# Patient Record
Sex: FEMALE | Race: BLACK OR AFRICAN AMERICAN | NOT HISPANIC OR LATINO | Employment: OTHER | URBAN - METROPOLITAN AREA
[De-identification: names, ages, dates, MRNs, and addresses within clinical notes are randomized per-mention and may not be internally consistent; named-entity substitution may affect disease eponyms.]

---

## 2017-01-03 ENCOUNTER — OFFICE VISIT (OUTPATIENT)
Dept: WOUND CARE | Facility: HOSPITAL | Age: 67
End: 2017-01-03
Attending: SURGERY

## 2017-01-03 PROCEDURE — 97602 WOUND(S) CARE NON-SELECTIVE: CPT

## 2017-01-03 NOTE — WOUND CARE CLINIC NOTE
ADMISSION DIAGNOSIS:  History of left 3rd toe amputation     HISTORY OF PRESENT ILLNESS: This 66-year-old female underwent a left toe amputation for chronic lower extremity left 3rd toe diabetic ulcer. The patient reports that she is doing well. The patient currently has no drainage from the site.     PHYSICAL EXAMINATION:  GENERAL: On today's exam, the patient is awake, alert, and in no apparent distress.   VITAL SIGNS: Temperature is 98.1, pulse 69, respirations 16, blood pressure is 148/74.   EXTREMITIES: Over the left 3rd toe, the operative site exhibits a small eschar. There is no erythema. Drainage is none. Foot edema is minimal. There is no sign of infection.     ASSESSMENT AND PLAN: The patient is progressing well. On today's visit, a few of her remaining sutures were removed. The patient has no sign of infection over the foot.  The patient may continue with her current Iodosorb gel dressing applied to the site once every other day. The patient may use a Darco offloading shoe.  The patient should elevate the leg while in the chair. The patient may return to work. She should avoid strenuous activity. The patient will return to see Dr. Gandara in 2 weeks.       Navjot Gandara M.D.  RM:Perez  D:   01/03/2017 08:41:20  T:   01/03/2017 09:02:44  Job ID:   [id number]  Document ID:   [id number]  Rev:  0  cc:   SAM Hernández M.D.

## 2017-01-17 ENCOUNTER — OFFICE VISIT (OUTPATIENT)
Dept: WOUND CARE | Facility: HOSPITAL | Age: 67
End: 2017-01-17
Attending: SURGERY

## 2017-01-17 PROCEDURE — 97602 WOUND(S) CARE NON-SELECTIVE: CPT

## 2017-01-17 NOTE — WOUND CARE CLINIC NOTE
DATE OF VISIT:   01/17/2017    ADMITTING DIAGNOSIS:  Left 3rd amputation of toe.     HISTORY OF PRESENT ILLNESS: This 66-year-old female underwent a left 3rd toe amputation in the past for chronic diabetic foot ulcer. The patient reports she is doing well. She is applying Iodosorb to the operative site. The patient has 1-2 sutures still intact.     PHYSICAL EXAMINATION:   GENERAL:  On today's exam, the patient is awake, alert, in no apparent distress.   VITAL SIGNS: Temperature is 97.4, pulse 68, respirations 16, blood pressure 143/52.   EXTREMITIES:  Over the left lower extremity the patient has no sign of erythema. The 2 retaining sutures today are intact. The patient has no sign of drainage. There is continued healing over the amputation site.     ASSESSMENT:  The patient's left amputation site appears to be healing well. On today's visit, the remaining 2 sutures will be removed. The patient will need to continue with Iodosorb applied to the operative site. The patient will use a hospital sandal for the next 2 weeks until complete healing has taken place. The patient will return to the Wound Care Clinic in 2 weeks with Dr. Gandara.      Navjot Gandara M.D.  RM:kristian  D:   01/17/2017 08:35:13  T:   01/17/2017 13:22:52  Job ID:   [id number]  Document ID:   [id number]  Rev:  0  cc:   SAM Hernández M.D.

## 2017-01-25 ENCOUNTER — OFFICE VISIT (OUTPATIENT)
Dept: CARDIOLOGY | Facility: CLINIC | Age: 67
End: 2017-01-25

## 2017-01-25 VITALS
HEIGHT: 64 IN | SYSTOLIC BLOOD PRESSURE: 120 MMHG | DIASTOLIC BLOOD PRESSURE: 80 MMHG | WEIGHT: 204 LBS | HEART RATE: 68 BPM | BODY MASS INDEX: 34.83 KG/M2

## 2017-01-25 DIAGNOSIS — Z94.0 RENAL TRANSPLANT RECIPIENT: ICD-10-CM

## 2017-01-25 DIAGNOSIS — I10 BENIGN HYPERTENSION: ICD-10-CM

## 2017-01-25 DIAGNOSIS — I34.0 NON-RHEUMATIC MITRAL REGURGITATION: Primary | ICD-10-CM

## 2017-01-25 DIAGNOSIS — E78.5 HYPERLIPIDEMIA, UNSPECIFIED HYPERLIPIDEMIA TYPE: ICD-10-CM

## 2017-01-25 PROCEDURE — 93000 ELECTROCARDIOGRAM COMPLETE: CPT | Performed by: INTERNAL MEDICINE

## 2017-01-25 PROCEDURE — 99214 OFFICE O/P EST MOD 30 MIN: CPT | Performed by: INTERNAL MEDICINE

## 2017-01-25 NOTE — MR AVS SNAPSHOT
Diana Gee   1/25/2017 2:00 PM   Office Visit    Dept Phone:  [phone]   Encounter #:  [id number]    Provider:  Ragini Capellan MD   Department:  [facility] CARDIOLOGY                Your Full Care Plan              Today's Medication Changes          These changes are accurate as of: 1/25/17  2:43 PM.  If you have any questions, ask your nurse or doctor.               Medication(s)that have changed:     HUMALOG KWIKPEN 100 UNIT/ML solution pen-injector   Generic drug:  Insulin Lispro   Inject 3 Units under the skin daily with breakfast.   What changed:  Another medication with the same name was removed. Continue taking this medication, and follow the directions you see here.       rosuvastatin 40 MG tablet   Commonly known as:  CRESTOR   TAKE 1 TABLET DAILY ( FUTURE APPOINTMENT 10/20/2015 )   What changed:  Another medication with the same name was removed. Continue taking this medication, and follow the directions you see here.   Changed by:  Regina Soares MD         Stop taking medication(s)listed here:     denosumab 60 MG/ML solution syringe   Commonly known as:  PROLIA   Stopped by:  Ragini Capellan MD           HYDROcodone-acetaminophen 5-325 MG per tablet   Commonly known as:  NORCO   Stopped by:  Ragini Capellan MD           valACYclovir 1000 MG tablet   Commonly known as:  VALTREX   Stopped by:  Ragini Capellan MD           zolpidem 10 MG tablet   Commonly known as:  AMBIEN   Stopped by:  Ragini Capellan MD                      Your Updated Medication List          This list is accurate as of: 1/25/17  2:43 PM.  Always use your most recent med list.                ACCU-CHEK [facility]CLIX LANCETS [facility]       ACCU-CHEK SMARTVIEW test strip   Generic drug:  glucose blood       allopurinol 100 MG tablet   Commonly known as:  ZYLOPRIM       BABY ASPIRIN PO       carvedilol 12.5 MG tablet   Commonly known as:  COREG       escitalopram 10 MG tablet   Commonly known as:  LEXAPRO   TAKE 1 TABLET DAILY       esomeprazole 40 MG capsule   Commonly known as:  nexIUM       gabapentin 600 MG tablet   Commonly known as:  NEURONTIN       HUMALOG KWIKPEN 100 UNIT/ML solution pen-injector   Generic drug:  Insulin Lispro       insulin detemir 100 UNIT/ML injection   Commonly known as:  LEVEMIR       Insulin Pen Needle 32G X 4 MM misc       levothyroxine 175 MCG tablet   Commonly known as:  SYNTHROID, LEVOTHROID       losartan 100 MG tablet   Commonly known as:  COZAAR       Magnesium Oxide 400 (240 MG) MG tablet       MULTI COMPLETE PO       MYFORTIC 360 MG tablet delayed-release EC tablet   Generic drug:  mycophenolate       rosuvastatin 40 MG tablet   Commonly known as:  CRESTOR       tacrolimus 1 MG capsule   Commonly known as:  PROGRAF       Vitamin D2 2000 UNITS tablet               We Performed the Following     ECG 12 Lead       You Were Diagnosed With        Codes Comments    Non-rheumatic mitral regurgitation    -  Primary ICD-10-CM: I34.0  ICD-9-CM: 424.0     Hyperlipidemia, unspecified hyperlipidemia type     ICD-10-CM: E78.5  ICD-9-CM: 272.4     Benign hypertension     ICD-10-CM: I10  ICD-9-CM: 401.1     Renal transplant recipient     ICD-10-CM: Z94.0  ICD-9-CM: V42.0       Instructions     None    Patient Instructions History      Upcoming Appointments     Visit Type Date Time Department    FOLLOW UP 1/25/2017  2:00 PM EL[facility] BLANCHARD [place]    FOLLOW UP 1/31/2017  8:15 AM  [facility]    OFFICE VISIT 4/6/2017  1:30 PM [facility]      [facility] Signup     [facility] [email or web address] allows you to send messages to your doctor, view your test results, renew your prescriptions, schedule appointments, and more. To sign up, go to [employer] and click on the Sign Up Now link in the New User? box. Enter your [email or web address] Activation Code exactly as it appears below along with the last four digits of your Social Security Number  "and your Date of Birth () to complete the sign-up process. If you do not sign up before the expiration date, you must request a new code.    [employer] Activation Code: [email or web address]  Expires: 2017  5:36 AM    If you have questions, you can email [email or web address] or call [phone] to talk to our [employer] staff. Remember, [employer] is NOT to be used for urgent needs. For medical emergencies, dial 911.               Other Info from Your Visit           Your Appointments     2017  8:15 AM EST   Follow Up with Navjot Gandara MD   [facility] WOUND CARE ([place])    [address] [facility] [address] [phone]           Arrive 15 minutes prior to appointment.            2017  1:30 PM EDT   Office Visit with Regina Soares MD   [facility] FAMILY MEDICINE (--)    [address]  [place] [address] [phone]           Arrive 15 minutes prior to appointment.            2018  2:20 PM EST   Follow Up with Ragini Capellan MD   [facility] CARDIOLOGY (--)    [address]  [place] [address] [phone]           Arrive 15 minutes prior to appointment.              Allergies     Heparin  Hives    Levaquin [Levofloxacin]  Hives      Reason for Visit     Hypertension           Vital Signs     Blood Pressure Pulse Height Weight Body Mass Index Smoking Status    120/80 (BP Location: Left arm, Patient Position: Sitting) 68 64\" (162.6 cm) 204 lb (92.5 kg) 35.02 kg/m2 Former Smoker      Problems and Diagnoses Noted     High blood pressure    High cholesterol or triglycerides    Leaky mitral heart valve    Renal transplant recipient      Results         "

## 2017-01-25 NOTE — PROGRESS NOTES
Date of Office Visit: 2017  Encounter Provider: Ragini Capellan MD  Place of Service: [facility] CARDIOLOGY  Patient Name: Diana Gee  :1950      Patient ID:  Diana Gee is a 66 y.o. female is here for  followup for MR and HTN.         History of Present Illness    She follows with Dr. Soares. She has a long history of mainly renal issues. She  developed glomerulonephritis in  and wound up requiring transplantation for kidney  failure. She had dialysis prior to this. She then developed diabetes and, with  progressive diabetes, her transplanted kidney failed. She was also found to have cancer  of her kidney. I am not sure if it was a transplanted kidney or her native which was  removed. She wound up back on dialysis in  and has a fistula that is functional still  in her right upper extremity. The prior left upper extremity AV fistula had failed. She  was on dialysis until  when she was transplanted again.      She does have a known history of hepatitis C. She has also had a deep venous thrombosis  in her right leg in  during a hospitalization. She has a history of gout and thyroid disease.   She has diabetes mellitus, hypertension, and hyperlipidemia, and she had rheumatic fever as a child.      She did have a cardiac catheterization done prior to her transplantation in . It was  done at [facility] or [facility], one of those two. We will get those  results.      She did have a 2-D echocardiogram with Doppler done on 2013 showing an ejection  fraction of 53%, grade 2 diastolic dysfunction, severe left atrial enlargement, moderate  right atrial enlargement, aortic valve thickening without stenosis, mild to moderate  mitral insufficiency, mild tricuspid insufficiency, and normal right ventricular systolic  pressure.            She had an echocardiogram performed in 2015 which revealed an ejection fraction  of 55-60%, grade II diastolic dysfunction, trace to mild mitral insufficiency, severe left atrial enlargement and calcification of the LVOT without obstruction.  She had lower extremity arterial Doppler studies performed in 06/2015 and these were normal.  I did find a heart catheterization from 05/2011 and this revealed normal left and right sided filling pressures, mild coronary disease, normal left ventricular systolic function, no pulmonary artery hypertension.  There were really only luminal irregularities noted in the LAD.  All the other vessels showed no disease at all.       She is overall doing well.  She has no tachycardia, dizziness, palpitations, syncope, exertional chest pain or pressure.  She denies any dyspnea on exertion or lower extremity edema.  She did have a toe amputated on her left foot.  That is the third toe and she is doing well.  It is healing well.  She says she feels good.  Her energy level is stable and her appetite is good.       She had a normal stress PET study in 06/2015.  This was obtained secondary to dyspnea on exertion.          Past Medical History   Diagnosis Date   • Abnormal glucose    • Acute bronchitis    • Acute upper respiratory infection    • C. difficile diarrhea 2008     Psychiatric   • Carrier of viral hepatitis C      Declared viral free in Sept '14  Dr Julio Serna  [facility]   • Cellulitis of foot    • Cerumen impaction    • Cerumen impaction    • Charcot's joint    • Depression with anxiety    • Diabetes mellitus    • Disease of thyroid gland    • Failed kidney transplant    • Gastric ulcer    • Gout    • Graves disease    • Heart murmur    • Hepatitis C    • Hyperlipidemia    • Hypertension    • Hypothyroidism    • Insomnia    • Kidney carcinoma    • Kidney failure    • MR (mitral regurgitation)      moderate, with cunningham dysfctn, severe LAE, mod OLENA, aortic thickening without insuff- Dr Horn   • Neuropathy    • Osteomyelitis of toe    • Osteoporosis    •  Peripheral edema    • Pes planus    • Subconjunctival hemorrhage    • Transplant recipient 1995, 2011     BILATERAL KIDNEYS   • Type 2 diabetes mellitus with diabetic mononeuropathy          Past Surgical History   Procedure Laterality Date   • Transplantation renal Left 1995 2011-RIGHT KIDNEY TRANSPLANT   • Cholecystectomy  1992   • Tubal abdominal ligation     • Nephrectomy Right 2008     CANCER   • Joint replacement Left 2001     SHOULDER   • Toe amputation Left 2014     FIFTH TOE   • Skin graft Right 2012     LEG   • Eye surgery Bilateral 1997, 1998     CATARACTS   • Tonsillectomy  1964   • Av fistula placement Bilateral 1992-LEFT ARM, 2008-RIGHT ARM   • Amputation digit Left 6/9/2016     Procedure: LT 4TH TOE AMPUTATION;  Surgeon: Navjot Gandara MD;  Location: [facility] OR [facility];  Service:    • Amputation digit Left 12/15/2016     Procedure: LT THIRD TOE AMPUTATION;  Surgeon: Navjot Gandara MD;  Location: [facility] OR;  Service:        Current Outpatient Prescriptions on File Prior to Visit   Medication Sig Dispense Refill   • ACCU-CHEK FASTCLIX LANCETS misc      • allopurinol (ZYLOPRIM) 100 MG tablet Take 100 mg by mouth.     • BABY ASPIRIN PO Take 81 mg by mouth.     • carvedilol (COREG) 12.5 MG tablet      • Ergocalciferol (VITAMIN D2) 2000 UNITS tablet Take 1 tablet by mouth daily.     • escitalopram (LEXAPRO) 10 MG tablet TAKE 1 TABLET DAILY 90 tablet 1   • esomeprazole (NexIUM) 40 MG capsule Take 40 mg by mouth every morning before breakfast.     • gabapentin (NEURONTIN) 600 MG tablet Take 400 mg by mouth 2 (Two) Times a Day.     • glucose blood (ACCU-CHEK SMARTVIEW) test strip      • insulin detemir (LEVEMIR) 100 UNIT/ML injection Inject 5 Units under the skin.     • Insulin Lispro, Human, (HUMALOG KWIKPEN) 100 UNIT/ML solution pen-injector Inject 3 Units under the skin daily with breakfast.     • Insulin Pen Needle 32G X 4 MM misc Inject 1 each under the skin.     • levothyroxine  (SYNTHROID, LEVOTHROID) 175 MCG tablet Take 175 mcg by mouth.     • losartan (COZAAR) 100 MG tablet Take 100 mg by mouth daily.     • Magnesium Oxide 400 (240 MG) MG tablet Take 2 tablets by mouth.     • Multiple Vitamins-Minerals (MULTI COMPLETE PO) Take  by mouth.     • mycophenolate (MYFORTIC) 360 MG tablet delayed-release EC tablet Take 2 tablets by mouth 2 (two) times a day.     • rosuvastatin (CRESTOR) 40 MG tablet TAKE 1 TABLET DAILY ( FUTURE APPOINTMENT 10/20/2015 )     • tacrolimus (PROGRAF) 1 MG capsule Take 1 mg by mouth 2 (two) times a day. 2 CAPS EACH DOSE     • [DISCONTINUED] denosumab (PROLIA) 60 MG/ML solution syringe Inject  under the skin.     • [DISCONTINUED] HYDROcodone-acetaminophen (NORCO) 5-325 MG per tablet Take 1 tablet by mouth every 6 (six) hours as needed for moderate pain (4-6) for up to 15 doses. 15 tablet 0   • [DISCONTINUED] insulin lispro (HUMALOG) 100 UNIT/ML injection Inject 3 Units under the skin.     • [DISCONTINUED] rosuvastatin (CRESTOR) 40 MG tablet Take 1 tablet by mouth daily.     • [DISCONTINUED] valACYclovir (VALTREX) 1000 MG tablet      • [DISCONTINUED] zolpidem (AMBIEN) 10 MG tablet Take 10 mg by mouth Daily.       No current facility-administered medications on file prior to visit.        Social History     Social History   • Marital status:      Spouse name: N/A   • Number of children: N/A   • Years of education: N/A     Occupational History   • Not on file.     Social History Main Topics   • Smoking status: Former Smoker     Quit date: 1993   • Smokeless tobacco: Not on file   • Alcohol use No      Comment: caffeine use   • Drug use: No   • Sexual activity: Defer     Other Topics Concern   • Not on file     Social History Narrative           Review of Systems   Constitution: Negative.   HENT: Negative for congestion and headaches.    Eyes: Negative for vision loss in left eye and vision loss in right eye.   Respiratory: Negative.  Negative for cough,  "hemoptysis, shortness of breath, sleep disturbances due to breathing, snoring, sputum production and wheezing.    Endocrine: Negative.    Hematologic/Lymphatic: Negative.    Skin: Negative for poor wound healing and rash.   Musculoskeletal: Negative for falls, gout, muscle cramps and myalgias.   Gastrointestinal: Negative for abdominal pain, diarrhea, dysphagia, hematemesis, melena, nausea and vomiting.   Neurological: Negative for excessive daytime sleepiness, dizziness, light-headedness, loss of balance, seizures and vertigo.   Psychiatric/Behavioral: Negative for depression and substance abuse. The patient is not nervous/anxious.        Procedures    ECG 12 Lead  Date/Time: 1/25/2017 2:33 PM  Performed by: LILA CALLAHAN  Authorized by: LILA CALLAHAN   Comparison: compared with previous ECG   Similar to previous ECG  Rhythm: sinus rhythm  Clinical impression: normal ECG               Objective:      Vitals:    01/25/17 1420   BP: 120/80   BP Location: Left arm   Patient Position: Sitting   Pulse: 68   Weight: 204 lb (92.5 kg)   Height: 64\" (162.6 cm)     Body mass index is 35.02 kg/(m^2).    Physical Exam   Constitutional: She is oriented to person, place, and time. She appears well-developed and well-nourished. No distress.   HENT:   Head: Normocephalic and atraumatic.   Eyes: Conjunctivae are normal. No scleral icterus.   Neck: Neck supple. No JVD present. Carotid bruit is not present. No thyromegaly present.   Cardiovascular: Normal rate, regular rhythm, S1 normal, S2 normal, normal heart sounds and intact distal pulses.   No extrasystoles are present. PMI is not displaced.  Exam reveals no gallop.    No murmur heard.  Pulses:       Carotid pulses are 2+ on the right side, and 2+ on the left side.       Radial pulses are 2+ on the right side, and 2+ on the left side.        Dorsalis pedis pulses are 2+ on the right side, and 2+ on the left side.        Posterior tibial pulses are 2+ on the right " side, and 2+ on the left side.   Pulmonary/Chest: Effort normal and breath sounds normal. No respiratory distress. She has no wheezes. She has no rhonchi. She has no rales. She exhibits no tenderness.   Abdominal: Soft. Bowel sounds are normal. She exhibits no distension, no abdominal bruit and no mass. There is no tenderness.   Musculoskeletal: She exhibits no edema or deformity.   Amputations of left toes   Lymphadenopathy:     She has no cervical adenopathy.   Neurological: She is alert and oriented to person, place, and time. No cranial nerve deficit.   Skin: Skin is warm and dry. No rash noted. She is not diaphoretic. No cyanosis. No pallor. Nails show no clubbing.   Psychiatric: She has a normal mood and affect. Judgment normal.   Vitals reviewed.      Lab Review:       Assessment:      Diagnosis Plan   1. Non-rheumatic mitral regurgitation     2. Hyperlipidemia, unspecified hyperlipidemia type     3. Benign hypertension     4. Renal transplant recipient           1. Mild mitral insufficiency, on echocardiogram 6/2015.   2. Aortic valve calcification. I really think this is the murmur that we are hearing.   There is no evidence of valvular stenosis.   3. Normal left ventricular systolic function with an ejection fraction of 53%.   4. Grade II diastolic dysfunction.   5. History of renal failure status post kidney transplantation done twice.   6. History of kidney cancer.   7. History of glomerulonephritis. She follows with Dr. Amber Meyers.   8. Osteoarthritis.   9. Gout is stable.   10. Hepatitis C is stable.   11. History of gastric ulcer is stable.   12. Blood clots in the right leg, she now has increasing LE edema on the right, set up venous duplex   study.   13. Hypertension well controlled at this point. She does have diastolic dysfunction which  probably is a result of this.   14. Diabetes mellitus type 2. s/p left toe amputation 1/2014 and 12/2016.  15. Hyperlipidemia.   16. History of rheumatic  fever as a child. I do not think that is the source of her  current valve issue.   17. Normal PET stress 6/2015.         Plan:       See back in 1 year, no changes.

## 2017-01-31 ENCOUNTER — OFFICE VISIT (OUTPATIENT)
Dept: WOUND CARE | Facility: HOSPITAL | Age: 67
End: 2017-01-31
Attending: SURGERY

## 2017-01-31 PROCEDURE — G0463 HOSPITAL OUTPT CLINIC VISIT: HCPCS

## 2017-02-01 NOTE — WOUND CARE CLINIC NOTE
DATE OF SERVICE: 01/31/2017     ADMITTING DIAGNOSIS: Amputation of left 3rd toe.     HISTORY OF PRESENT ILLNESS: This 66-year-old female underwent a left 3rd toe amputation in the past for a chronic diabetic foot ulcer. The patient is currently applying an Iodosorb gel to this amputation site with no history of drainage. The patient is wearing a surgical shoe currently and avoiding pressure over the operative site.     PHYSICAL EXAMINATION:  GENERAL: On today's exam, the patient is awake, alert, in no apparent distress.   VITAL SIGNS: Temperature is 98.1, pulse 65, respirations 18, blood pressure 183/84.   EXTREMITIES: Over the left foot the 3rd toe amputation site exhibits well apposed skin edges. There is no erythema. The site appears to have healed. There is no sign of drainage.     ASSESSMENT AND PLAN: Mrs. Gee is progressing well. On today's exam, the patient’s operative site appears to have healed. We plan to begin a skin moisturizer to this site. She may wear a piece of foam over the site as a protective barrier for the next couple of weeks with a small Band-Aid. The patient may advance into her diabetic shoe with inserts. The patient may return to the Wound Care Clinic on an as-needed basis.      Navjot Gandara M.D.  RM:sushma  D:   01/31/2017 08:29:03  T:   01/31/2017 22:34:20  Job ID:   [id number]  Document ID:   [id number]  Rev:  0  cc:   SAM Hernández M.D.

## 2017-04-05 RX ORDER — ESCITALOPRAM OXALATE 10 MG/1
TABLET ORAL
Qty: 90 TABLET | Refills: 0 | Status: SHIPPED | OUTPATIENT
Start: 2017-04-05 | End: 2017-06-24 | Stop reason: SDUPTHER

## 2017-04-07 ENCOUNTER — OFFICE VISIT (OUTPATIENT)
Dept: FAMILY MEDICINE CLINIC | Facility: CLINIC | Age: 67
End: 2017-04-07

## 2017-04-07 VITALS
SYSTOLIC BLOOD PRESSURE: 128 MMHG | HEIGHT: 64 IN | RESPIRATION RATE: 16 BRPM | BODY MASS INDEX: 35.68 KG/M2 | DIASTOLIC BLOOD PRESSURE: 76 MMHG | WEIGHT: 209 LBS | HEART RATE: 70 BPM | OXYGEN SATURATION: 97 %

## 2017-04-07 DIAGNOSIS — M19.90 ARTHRITIS: Primary | ICD-10-CM

## 2017-04-07 DIAGNOSIS — E10.65 HYPERGLYCEMIA DUE TO TYPE 1 DIABETES MELLITUS (HCC): ICD-10-CM

## 2017-04-07 PROCEDURE — 99213 OFFICE O/P EST LOW 20 MIN: CPT | Performed by: FAMILY MEDICINE

## 2017-04-07 RX ORDER — ROSUVASTATIN CALCIUM 40 MG/1
TABLET, COATED ORAL
COMMUNITY
Start: 2016-12-06 | End: 2019-05-29

## 2017-04-07 RX ORDER — ESCITALOPRAM OXALATE 10 MG/1
10 TABLET ORAL
COMMUNITY
End: 2017-04-07

## 2017-04-07 RX ORDER — ZOLPIDEM TARTRATE 10 MG/1
10 TABLET ORAL DAILY
COMMUNITY
End: 2018-01-31

## 2017-04-07 RX ORDER — LANCETS
EACH MISCELLANEOUS
COMMUNITY
Start: 2017-01-30 | End: 2019-05-29

## 2017-04-07 RX ORDER — ESOMEPRAZOLE MAGNESIUM 10 MG/1
40 GRANULE, FOR SUSPENSION, EXTENDED RELEASE ORAL
COMMUNITY
End: 2017-04-07

## 2017-04-07 RX ORDER — LEVOTHYROXINE SODIUM 175 UG/1
175 TABLET ORAL
COMMUNITY
Start: 2016-12-30 | End: 2018-01-04

## 2017-04-07 RX ORDER — GABAPENTIN 600 MG/1
600 TABLET ORAL
COMMUNITY
End: 2017-04-07

## 2017-04-07 RX ORDER — TRAMADOL HYDROCHLORIDE 50 MG/1
TABLET ORAL
Qty: 40 TABLET | Refills: 2 | Status: SHIPPED | OUTPATIENT
Start: 2017-04-07 | End: 2018-01-31

## 2017-04-07 RX ORDER — ALLOPURINOL 100 MG/1
100 TABLET ORAL
COMMUNITY
End: 2017-04-07

## 2017-04-07 NOTE — PROGRESS NOTES
"Subjective   Diana Gee is a 67 y.o. female.     History of Present Illness Here to fu on diabetes. Thinks her sugars have been very good.    Sugars are good. FBS this am 103. Highest she has seen was 140 at night after a desert.  Exercise: on feet all day at work. 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 Likes treadmill, sta bike. Likes things she can hold on to.    Has been released by Dr Pedersen. BUt has pain in her R thumb.  The following portions of the patient's history were reviewed and updated as appropriate: allergies, current medications, past social history and problem list.    Review of Systems   Constitutional: Negative for activity change, appetite change and unexpected weight change.   HENT: Negative for nosebleeds and trouble swallowing.    Eyes: Negative for pain and visual disturbance.   Respiratory: Negative for chest tightness, shortness of breath and wheezing.    Cardiovascular: Negative for chest pain and palpitations.   Gastrointestinal: Negative for abdominal pain and blood in stool.   Endocrine: Negative.    Genitourinary: Negative for difficulty urinating and hematuria.   Musculoskeletal: Positive for arthralgias (First digit enlarged and tender. Not red or hot.). Negative for joint swelling.   Skin: Negative for color change and rash.   Allergic/Immunologic: Negative.    Neurological: Negative for syncope and speech difficulty.   Hematological: Negative for adenopathy.   Psychiatric/Behavioral: Negative for agitation and confusion.   All other systems reviewed and are negative.      Objective   /76  Pulse 70  Resp 16  Ht 64\" (162.6 cm)  Wt 209 lb (94.8 kg)  SpO2 97%  BMI 35.87 kg/m2  Physical Exam   Constitutional: She is oriented to person, place, and time. Vital signs are normal. She appears well-developed and well-nourished. No distress.   HENT:   Head: Normocephalic.   Neck: Carotid bruit is not present. No thyromegaly present. "   Cardiovascular: Normal rate, regular rhythm and normal heart sounds.    Pulmonary/Chest: Effort normal and breath sounds normal.   Musculoskeletal: Tenderness: R PIP enlarged and tender.FROM with pain.   Neurological: She is alert and oriented to person, place, and time. Gait normal.   Psychiatric: She has a normal mood and affect. Her behavior is normal. Judgment and thought content normal.   Vitals reviewed.      Assessment/Plan   Problem List Items Addressed This Visit     None      Visit Diagnoses     Arthritis    -  Primary    Relevant Medications    traMADol (ULTRAM) 50 MG tablet    Hyperglycemia due to type 1 diabetes mellitus        Relevant Medications    insulin lispro (humaLOG) 100 UNIT/ML injection    insulin detemir (LEVEMIR FLEXTOUCH) 100 UNIT/ML injection    Other Relevant Orders    Hemoglobin A1c           Will see Dr Wiley for R thumb pain.

## 2017-04-08 LAB — HBA1C MFR BLD: 5.6 % (ref 4.8–5.6)

## 2017-06-19 ENCOUNTER — OFFICE VISIT (OUTPATIENT)
Dept: FAMILY MEDICINE CLINIC | Facility: CLINIC | Age: 67
End: 2017-06-19

## 2017-06-19 VITALS
DIASTOLIC BLOOD PRESSURE: 70 MMHG | SYSTOLIC BLOOD PRESSURE: 124 MMHG | OXYGEN SATURATION: 96 % | HEIGHT: 64 IN | WEIGHT: 218 LBS | HEART RATE: 72 BPM | BODY MASS INDEX: 37.22 KG/M2

## 2017-06-19 DIAGNOSIS — M65.4 DE QUERVAIN'S TENOSYNOVITIS, RIGHT: Primary | ICD-10-CM

## 2017-06-19 PROCEDURE — 99212 OFFICE O/P EST SF 10 MIN: CPT | Performed by: FAMILY MEDICINE

## 2017-06-19 NOTE — PROGRESS NOTES
"Subjective   Diana Gee is a 67 y.o. female.     History of Present Illness R hand may have CTSZ. Thumb and wrist hurst , madhu with lifting.  Has had pain in base of thumb for years, but it is now more pronounce and hindering her abilities to do routine household tasks and pain radiates into the forearm.    The following portions of the patient's history were reviewed and updated as appropriate: allergies, current medications, past social history and problem list.    Review of Systems   Constitutional: Negative.    Respiratory: Negative.    Cardiovascular: Negative.    Musculoskeletal: Positive for arthralgias, joint swelling and myalgias.       Objective   /70 (BP Location: Right arm, Patient Position: Sitting, Cuff Size: Large Adult)  Pulse 72  Ht 64\" (162.6 cm)  Wt 218 lb (98.9 kg)  SpO2 96%  BMI 37.42 kg/m2  Physical Exam   Constitutional: She appears well-developed and well-nourished.   Cardiovascular: Normal rate.    Pulmonary/Chest: Effort normal.   Musculoskeletal: She exhibits tenderness (R first MCP of hand is tender, as are the extensor tendons lat forearm. Pain rad across wrist into arm with lifitng). She exhibits no edema or deformity.   No digital or wrist edema or crepitus   Nursing note and vitals reviewed.      Assessment/Plan   Problem List Items Addressed This Visit        Musculoskeletal and Integument    De Quervain's tenosynovitis, right - Primary           Thumb immobilization splint for 2 weeks. If no help, call and refer to hand surgery.  "

## 2017-06-26 RX ORDER — ESCITALOPRAM OXALATE 10 MG/1
TABLET ORAL
Qty: 90 TABLET | Refills: 4 | Status: SHIPPED | OUTPATIENT
Start: 2017-06-26 | End: 2017-08-02

## 2017-07-27 ENCOUNTER — APPOINTMENT (OUTPATIENT)
Dept: WOMENS IMAGING | Facility: HOSPITAL | Age: 67
End: 2017-07-27

## 2017-07-27 PROCEDURE — G0202 SCR MAMMO BI INCL CAD: HCPCS | Performed by: RADIOLOGY

## 2018-01-04 ENCOUNTER — OFFICE VISIT (OUTPATIENT)
Dept: FAMILY MEDICINE CLINIC | Facility: CLINIC | Age: 68
End: 2018-01-04

## 2018-01-04 VITALS
TEMPERATURE: 97.9 F | WEIGHT: 218 LBS | DIASTOLIC BLOOD PRESSURE: 72 MMHG | HEIGHT: 64 IN | SYSTOLIC BLOOD PRESSURE: 128 MMHG | BODY MASS INDEX: 37.22 KG/M2 | HEART RATE: 78 BPM | OXYGEN SATURATION: 99 %

## 2018-01-04 DIAGNOSIS — F41.8 MIXED ANXIETY DEPRESSIVE DISORDER: Primary | ICD-10-CM

## 2018-01-04 PROBLEM — M65.4 DE QUERVAIN'S TENOSYNOVITIS, RIGHT: Status: RESOLVED | Noted: 2017-06-19 | Resolved: 2018-01-04

## 2018-01-04 PROCEDURE — 99213 OFFICE O/P EST LOW 20 MIN: CPT | Performed by: FAMILY MEDICINE

## 2018-01-04 RX ORDER — ESCITALOPRAM OXALATE 20 MG/1
20 TABLET ORAL DAILY
Qty: 90 TABLET | Refills: 1 | Status: SHIPPED | OUTPATIENT
Start: 2018-01-04 | End: 2018-06-15 | Stop reason: SDUPTHER

## 2018-01-04 RX ORDER — LEVOTHYROXINE SODIUM 0.12 MG/1
125 TABLET ORAL
COMMUNITY
Start: 2017-10-24 | End: 2018-01-22

## 2018-01-07 NOTE — PROGRESS NOTES
"Subjective   Diana Gee is a 67 y.o. female. Pt here to establish care, was previously seen by Dr. Soares. She is here to follow up on her depression and anxiety.    Chief Complaint   Patient presents with   • Establish Care   • Med Refill     lexapro        History of Present Illness    Depression anxiety  Has been taking escitalopram with good results. Diagnosed in  when learned  was having an affair. She was started on citalopram and did well for years but then needed change. Has been doing well. Still working in school, enjoys the kids, keeps her active. Mood has been good. Would like to work more on weight loss. Was started on Ambien for sleep after her uncle  last year and she had been his primary care giver, had a really hard time with his death.    Diabetes  S/p renal transplant x 2. Also hx of RCC. Sees endocrine. Reports she is overall doing well. Also sees them for her hypothyroidism.    Sees cardiology for mitral valve insufficiency, seen about one year ago and was stable    Also hx of hep C, treated twice but in remission.      The following portions of the patient's history were reviewed and updated as appropriate: allergies, current medications, past family history, past medical history, past social history, past surgical history and problem list.      Review of Systems   Constitutional: Negative for activity change, appetite change and unexpected weight change.   Respiratory: Negative for shortness of breath.    Cardiovascular: Negative for chest pain.   Gastrointestinal: Negative for blood in stool, constipation and diarrhea.   Psychiatric/Behavioral: Negative for dysphoric mood. The patient is not nervous/anxious.        Objective   Blood pressure 128/72, pulse 78, temperature 97.9 °F (36.6 °C), temperature source Oral, height 162.6 cm (64\"), weight 98.9 kg (218 lb), SpO2 99 %.  Physical Exam   Constitutional: She is oriented to person, place, and time. She appears " well-nourished. No distress.   Eyes: Conjunctivae are normal. Right eye exhibits no discharge. Left eye exhibits no discharge.   Neck: Neck supple. No thyromegaly present.   Cardiovascular: Normal rate, regular rhythm and normal heart sounds.    No murmur heard.  Pulmonary/Chest: Effort normal and breath sounds normal. No respiratory distress. She has no wheezes.   Lymphadenopathy:     She has no cervical adenopathy.   Neurological: She is alert and oriented to person, place, and time.   Psychiatric: She has a normal mood and affect. Her behavior is normal.   Vitals reviewed.      Assessment/Plan   Diana was seen today for establish care and med refill.    Diagnoses and all orders for this visit:    Mixed anxiety depressive disorder    Other orders  -     escitalopram (LEXAPRO) 20 MG tablet; Take 1 tablet by mouth Daily.

## 2018-01-31 ENCOUNTER — OFFICE VISIT (OUTPATIENT)
Dept: CARDIOLOGY | Facility: CLINIC | Age: 68
End: 2018-01-31

## 2018-01-31 VITALS
BODY MASS INDEX: 36.88 KG/M2 | DIASTOLIC BLOOD PRESSURE: 88 MMHG | WEIGHT: 216 LBS | HEART RATE: 67 BPM | SYSTOLIC BLOOD PRESSURE: 138 MMHG | HEIGHT: 64 IN

## 2018-01-31 DIAGNOSIS — E78.5 HYPERLIPIDEMIA, UNSPECIFIED HYPERLIPIDEMIA TYPE: ICD-10-CM

## 2018-01-31 DIAGNOSIS — I10 BENIGN HYPERTENSION: Primary | ICD-10-CM

## 2018-01-31 DIAGNOSIS — I34.0 NON-RHEUMATIC MITRAL REGURGITATION: ICD-10-CM

## 2018-01-31 DIAGNOSIS — E11.59 TYPE 2 DIABETES MELLITUS WITH OTHER CIRCULATORY COMPLICATION, UNSPECIFIED LONG TERM INSULIN USE STATUS: ICD-10-CM

## 2018-01-31 DIAGNOSIS — Z94.0 RENAL TRANSPLANT RECIPIENT: ICD-10-CM

## 2018-01-31 PROCEDURE — 99214 OFFICE O/P EST MOD 30 MIN: CPT | Performed by: NURSE PRACTITIONER

## 2018-01-31 PROCEDURE — 93000 ELECTROCARDIOGRAM COMPLETE: CPT | Performed by: NURSE PRACTITIONER

## 2018-06-15 RX ORDER — ESCITALOPRAM OXALATE 20 MG/1
TABLET ORAL
Qty: 90 TABLET | Refills: 1 | Status: SHIPPED | OUTPATIENT
Start: 2018-06-15 | End: 2018-12-12 | Stop reason: SDUPTHER

## 2018-07-21 NOTE — PROGRESS NOTES
Subjective     REASON FOR CONSULTATION:  History of renal cell carcinoma with now sclerotic lesions at T8 and T7 vertebral body.  Provide an opinion on any further workup or treatment                             REQUESTING PHYSICIAN: Dr. Alberto Myers    RECORDS OBTAINED:  Records of the patients history including those obtained from the referring provider were reviewed and summarized in detail.    HISTORY OF PRESENT ILLNESS:  The patient is a 68 y.o. year old female who is here for an opinion about the above issue.    History of Present Illness patient is a 68-year-old female with history of renal transplant ×2 is here for follow-up she has had history of renal cell cancer back in 2008 and has undergone right nephrectomy by Dr. Alberto Myers.  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  Her first transplant was back in 1995.  She has been on Prograf, immunosuppressive therapy and doing well.  She states that recently the urologist had done a CT scan    CT scan of the chest abdomen pelvis done June 21, 2018 showed some healed rib fractures.  There was evidence of new sclerosis and erosions in the right T8 superior and right T7 inferior end plates.  No suspicious or pulmonary parenchymal finding is seen then at your left kidney is atrophic and measures 56 mm.    Given the new sclerosis in the right side of T7 and T8 vertebrae and MRI was suggested with contrast.  There is abnormal activity in the midline of T8 at the midthoracic spine probably the bone scan the abnormal activity at T8 is present.    Patient's creatinine is 0.77.  Given that she has had 2 kidney transplants I'm concerned about doing an MRI with contrast.  Patient states that she hasn't lost weight and she is got a very good appetite.  She doesn't have any back pain.    Past Medical History:   Diagnosis Date   • Abnormal glucose    • Acute bronchitis    • Acute upper respiratory  infection    • C. difficile diarrhea 2008    [facility]   • Carrier of viral hepatitis C (CMS/HCC)     Declared viral free in Sept '14  Dr Julio Serna  [facility]   • Cellulitis of foot    • Cerumen impaction    • Cerumen impaction    • Charcot's joint    • Depression with anxiety    • Diabetes mellitus (CMS/HCC)    • Disease of thyroid gland    • Failed kidney transplant    • Gastric ulcer    • Gout    • Graves disease    • Heart murmur    • Hepatitis C    • Hyperlipidemia    • Hypertension    • Hypothyroidism    • Insomnia    • Kidney carcinoma (CMS/HCC)    • Kidney failure    • MR (mitral regurgitation)     moderate, with cunningham dysfctn, severe LAE, mod LOENA, aortic thickening without insuff- Dr Horn   • Neuropathy    • Osteomyelitis of toe (CMS/HCC)    • Osteoporosis    • Peripheral edema    • Pes planus    • Subconjunctival hemorrhage    • Transplant recipient 1995, 2011    BILATERAL KIDNEYS   • Type 2 diabetes mellitus with diabetic mononeuropathy (CMS/HCC)         Past Surgical History:   Procedure Laterality Date   • AMPUTATION DIGIT Left 6/9/2016    Procedure: LT 4TH TOE AMPUTATION;  Surgeon: Navjot Gandara MD;  Location: [facility];  Service:    • AMPUTATION DIGIT Left 12/15/2016    Procedure: LT THIRD TOE AMPUTATION;  Surgeon: Navjot Gandara MD;  Location: [facility] OR;  Service:    • ARTERIOVENOUS FISTULA Bilateral 1992-LEFT ARM, 2008-RIGHT ARM   • CHOLECYSTECTOMY  1992   • EYE SURGERY Bilateral 1997, 1998    CATARACTS   • JOINT REPLACEMENT Left 2001    SHOULDER   • NEPHRECTOMY Right 2008    CANCER   • SKIN GRAFT Right 2012    LEG   • TOE AMPUTATION Left 2014    FIFTH TOE   • TONSILLECTOMY  1964   • TRANSPLANTATION RENAL Left 1995 2011-RIGHT KIDNEY TRANSPLANT   • TUBAL ABDOMINAL LIGATION          Current Outpatient Prescriptions on File Prior to Visit   Medication Sig Dispense Refill   • ACCU-CHEK FASTCLIX LANCETS misc USE 1 LANCET FOUR TIMES A DAY     • BABY ASPIRIN PO Take 81  mg by mouth.     • carvedilol (COREG) 12.5 MG tablet Take 12.5 mg by mouth 2 (Two) Times a Day With Meals.     • Ergocalciferol (VITAMIN D2) 2000 UNITS tablet Take 1 tablet by mouth daily.     • escitalopram (LEXAPRO) 20 MG tablet TAKE 1 TABLET DAILY 90 tablet 1   • esomeprazole (NexIUM) 40 MG capsule Take 40 mg by mouth every morning before breakfast.     • glucose blood (ACCU-CHEK SMARTVIEW) test strip      • glucose blood (ACCU-CHEK SMARTVIEW) test strip      • insulin detemir (LEVEMIR FLEXTOUCH) 100 UNIT/ML injection INJECT 5 UNITS UNDER THE SKIN NIGHTLY     • insulin lispro (humaLOG) 100 UNIT/ML injection Inject 3 Units under the skin.     • Insulin Pen Needle (BD PEN NEEDLE KYLAH U/F) 32G X 4 MM misc USE 1 PEN NEEDLE FOUR TIMES A DAY     • Insulin Pen Needle 32G X 4 MM misc Inject 1 each under the skin.     • losartan (COZAAR) 100 MG tablet Take 100 mg by mouth daily.     • Multiple Vitamins-Minerals (MULTI COMPLETE PO) Take  by mouth.     • mycophenolate (MYFORTIC) 360 MG tablet delayed-release EC tablet Take 2 tablets by mouth 2 (two) times a day.     • rosuvastatin (CRESTOR) 40 MG tablet TAKE 1 TABLET DAILY ( FUTURE APPOINTMENT 10/20/2015 )     • tacrolimus (PROGRAF) 1 MG capsule Take 1 mg by mouth 2 (two) times a day. 2 CAPS EACH DOSE       No current facility-administered medications on file prior to visit.         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       Objective     There were no vitals filed for this visit.  No flowsheet data found.    Physical Exam      GENERAL:  Well-developed, well-nourished in no acute distress.   SKIN:  Warm, dry without rashes, purpura or petechiae.  EYES:  Pupils equal, round and reactive to light.  EOMs intact.  Conjunctivae normal.  EARS:  Hearing intact.  NOSE:  Septum midline.  No excoriations or nasal discharge.  MOUTH:  Tongue is well-papillated; no stomatitis or ulcers.  Lips normal.  THROAT:  Oropharynx without lesions or exudates.  NECK:  Supple with good range of motion; no thyromegaly or masses, no JVD.  LYMPHATICS:  No cervical, supraclavicular, axillary or inguinal adenopathy.  CHEST:  Lungs clear to auscultation. Good airflow.  CARDIAC:  Regular rate and rhythm without murmurs, rubs or gallops. Normal S1,S2.  ABDOMEN:  Soft, nontender with no hepatosplenomegaly or masses.  EXTREMITIES:  No clubbing, cyanosis or edema.  NEUROLOGICAL:  Cranial Nerves II-XII grossly intact.  No focal neurological deficits.  PSYCHIATRIC:  Normal affect and mood.        RECENT LABS:  Hematology WBC   Date Value Ref Range Status   12/17/2016 9.17 4.50 - 10.70 10*3/mm3 Final     RBC   Date Value Ref Range Status   12/17/2016 4.22 3.90 - 5.20 10*6/mm3 Final     Hemoglobin   Date Value Ref Range Status   12/17/2016 11.7 (L) 11.9 - 15.5 g/dL Final     Hematocrit   Date Value Ref Range Status   12/17/2016 37.3 35.6 - 45.5 % Final     Platelets   Date Value Ref Range Status   12/17/2016 177 140 - 500 10*3/mm3 Final          Assessment/Plan     1.  History of renal cell cancer status post right nephrectomy in 2008.  She is been followed with observation with CT scans.    2.  Status post kidney transplant ×2, initial one was in 1995 and there was rejection in 2008 and subsequently patient  underwent a second kidney transplant in 2011.    3.  History of new onset sclerosis seen at thoracic 8 and thoracic 7 vertebrae on a routine CT scan of the chest abdomen pelvis.  A bone scan was obtained which also shows evidence of abnormality on T8 vertebral body.  I'm unsure if this is malignancy or arthritis.  Given the history of renal cell carcinoma likely best to obtain a CT-guided biopsy of the lesion at the 8 vertebral body.  I did not order an MRI of the spine with contrast given that she has had 2 kidney transplants.  Patient is on immunosuppressive therapy    4.  Anemia    Plan 1.  Obtain CT-guided biopsy of the sclerotic lesion at T8 vertebral body.    2.  Obtain anemia workup.    3.  In 2 weeks with me.    Yanni Saucedo MD

## 2018-07-23 ENCOUNTER — CONSULT (OUTPATIENT)
Dept: ONCOLOGY | Facility: CLINIC | Age: 68
End: 2018-07-23

## 2018-07-23 ENCOUNTER — LAB (OUTPATIENT)
Dept: LAB | Facility: HOSPITAL | Age: 68
End: 2018-07-23

## 2018-07-23 VITALS
HEART RATE: 75 BPM | WEIGHT: 215.2 LBS | OXYGEN SATURATION: 99 % | DIASTOLIC BLOOD PRESSURE: 70 MMHG | RESPIRATION RATE: 18 BRPM | TEMPERATURE: 98.2 F | SYSTOLIC BLOOD PRESSURE: 100 MMHG | BODY MASS INDEX: 36.74 KG/M2 | HEIGHT: 64 IN

## 2018-07-23 DIAGNOSIS — C64.1 CARCINOMA OF RIGHT KIDNEY (HCC): ICD-10-CM

## 2018-07-23 DIAGNOSIS — C64.9 RENAL CELL CARCINOMA, UNSPECIFIED LATERALITY (HCC): ICD-10-CM

## 2018-07-23 DIAGNOSIS — Z94.0 RENAL TRANSPLANT RECIPIENT: ICD-10-CM

## 2018-07-23 DIAGNOSIS — C64.9 RENAL CELL CARCINOMA, UNSPECIFIED LATERALITY (HCC): Primary | ICD-10-CM

## 2018-07-23 DIAGNOSIS — C64.1 CARCINOMA OF RIGHT KIDNEY (HCC): Primary | ICD-10-CM

## 2018-07-23 LAB
BASOPHILS # BLD AUTO: 0.02 10*3/MM3 (ref 0–0.1)
BASOPHILS NFR BLD AUTO: 0.5 % (ref 0–1.1)
DEPRECATED RDW RBC AUTO: 41.1 FL (ref 37–49)
EOSINOPHIL # BLD AUTO: 0.2 10*3/MM3 (ref 0–0.36)
EOSINOPHIL NFR BLD AUTO: 5.3 % (ref 1–5)
ERYTHROCYTE [DISTWIDTH] IN BLOOD BY AUTOMATED COUNT: 12.7 % (ref 11.7–14.5)
FERRITIN SERPL-MCNC: 109 NG/ML
HCT VFR BLD AUTO: 40.6 % (ref 34–45)
HGB BLD-MCNC: 13.3 G/DL (ref 11.5–14.9)
IMM GRANULOCYTES # BLD: 0.02 10*3/MM3 (ref 0–0.03)
IMM GRANULOCYTES NFR BLD: 0.5 % (ref 0–0.5)
IRON 24H UR-MRATE: 61 MCG/DL (ref 37–145)
IRON SATN MFR SERPL: 20 % (ref 14–48)
LYMPHOCYTES # BLD AUTO: 1.27 10*3/MM3 (ref 1–3.5)
LYMPHOCYTES NFR BLD AUTO: 34 % (ref 20–49)
MCH RBC QN AUTO: 29 PG (ref 27–33)
MCHC RBC AUTO-ENTMCNC: 32.8 G/DL (ref 32–35)
MCV RBC AUTO: 88.5 FL (ref 83–97)
MONOCYTES # BLD AUTO: 0.47 10*3/MM3 (ref 0.25–0.8)
MONOCYTES NFR BLD AUTO: 12.6 % (ref 4–12)
NEUTROPHILS # BLD AUTO: 1.76 10*3/MM3 (ref 1.5–7)
NEUTROPHILS NFR BLD AUTO: 47.1 % (ref 39–75)
NRBC BLD MANUAL-RTO: 0 /100 WBC (ref 0–0)
PLATELET # BLD AUTO: 138 10*3/MM3 (ref 150–375)
PMV BLD AUTO: 10.6 FL (ref 8.9–12.1)
RBC # BLD AUTO: 4.59 10*6/MM3 (ref 3.9–5)
TIBC SERPL-MCNC: 300 MCG/DL (ref 249–505)
TRANSFERRIN SERPL-MCNC: 214 MG/DL (ref 200–360)
VIT B12 BLD-MCNC: 1097 PG/ML (ref 211–946)
WBC NRBC COR # BLD: 3.74 10*3/MM3 (ref 4–10)

## 2018-07-23 PROCEDURE — 82607 VITAMIN B-12: CPT | Performed by: INTERNAL MEDICINE

## 2018-07-23 PROCEDURE — 36415 COLL VENOUS BLD VENIPUNCTURE: CPT | Performed by: INTERNAL MEDICINE

## 2018-07-23 PROCEDURE — 99205 OFFICE O/P NEW HI 60 MIN: CPT | Performed by: INTERNAL MEDICINE

## 2018-07-23 PROCEDURE — 85025 COMPLETE CBC W/AUTO DIFF WBC: CPT | Performed by: INTERNAL MEDICINE

## 2018-07-23 PROCEDURE — 84466 ASSAY OF TRANSFERRIN: CPT | Performed by: INTERNAL MEDICINE

## 2018-07-23 PROCEDURE — 83540 ASSAY OF IRON: CPT | Performed by: INTERNAL MEDICINE

## 2018-07-23 PROCEDURE — 82728 ASSAY OF FERRITIN: CPT | Performed by: INTERNAL MEDICINE

## 2018-07-24 LAB
FOLATE BLD-MCNC: 532.4 NG/ML
FOLATE RBC-MCNC: 1283 NG/ML
HCT VFR BLD AUTO: 41.5 % (ref 34–46.6)

## 2018-07-26 ENCOUNTER — DOCUMENTATION (OUTPATIENT)
Dept: ONCOLOGY | Facility: CLINIC | Age: 68
End: 2018-07-26

## 2018-07-26 NOTE — PROGRESS NOTES
"Call to patient about the \"8\" she scored on the distress tool on 7/23/18. She marked issues of work, financial, nervousness and tingling in her feet. 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 This would help financially, but would also keep her mind off physical issues.    She stated she was very surprised when there were abnormalities on a routine CT recently. She is having a needle biopsy tomorrow and sees Dr. KIRBY in 2 weeks. Pt scored herself a \"4\" today, and is hoping that there will not be any serious issues on her test.     Pt was grateful for the call. She said any anxiety she has at this time is about having to wait for any test results. SW offered assistance in the future if needed.  "

## 2018-07-27 ENCOUNTER — HOSPITAL ENCOUNTER (OUTPATIENT)
Dept: CT IMAGING | Facility: HOSPITAL | Age: 68
Discharge: HOME | End: 2018-07-27
Attending: INTERNAL MEDICINE

## 2018-07-27 ENCOUNTER — DOCUMENTATION (OUTPATIENT)
Dept: ONCOLOGY | Facility: CLINIC | Age: 68
End: 2018-07-27

## 2018-07-27 VITALS
TEMPERATURE: 97 F | RESPIRATION RATE: 16 BRPM | HEIGHT: 64 IN | SYSTOLIC BLOOD PRESSURE: 148 MMHG | BODY MASS INDEX: 36.37 KG/M2 | WEIGHT: 213 LBS | DIASTOLIC BLOOD PRESSURE: 77 MMHG | HEART RATE: 77 BPM | OXYGEN SATURATION: 96 %

## 2018-07-27 DIAGNOSIS — C64.9 RENAL CELL CARCINOMA, UNSPECIFIED LATERALITY (HCC): ICD-10-CM

## 2018-07-27 NOTE — PROGRESS NOTES
Dr. santos called us on Friday, 7/27/2018.  He felt that the lesion in the bone was very small and an accessible.  He recommended not biopsying the area and instead recommended continued observation with follow-up CT scan in a few months.  Patient has a follow-up appointment with Dr. Saucedo on 8/10/2018 and we will keep that appointment so that they can discuss the situation further and arrange for follow-up CT scan.

## 2018-07-27 NOTE — DISCHARGE INSTRUCTIONS
EDUCATION /DISCHARGE INSTRUCTIONS  CT/US guided biopsy:  A biopsy is a procedure done to remove tissue for further analysis.  Before images are taken to locate the target area.  Images can be obtained using ultrasound, CT or MRI.  A physician will clean your skin with antiseptic soap, place a sterile towel around the site and administer a local anesthetic to numb the area.  The physician will then insert a special needle.  Sometimes images are taken of the needle after it is inserted to ensure the needle is in the correct area to be biopsied.   A sample is obtained and sent to the laboratory for study.  Occasionally the laboratory is unable to make a diagnosis from the sample and the procedure may need to be repeated.  Within a week the radiologist will send a report to your physician.  A pathologist will also examine the tissue and send a report.      Risks of the procedure include but are not limited to:   *  Bleeding    *  Infection   *  Puncture of surrounding organs *  Death     *  Lung collapse if the biopsy is near the chest which may require insertion of a tube to re-inflate the lung if severe.      Benefits of the procedure:  Using x-ray helps to locate the area that requires a biopsy. The procedure is less invasive than a surgical procedure, there are no large incisions and it does not require anesthesia.      Alternatives to the procedure:  A biopsy can be performed surgically.  Risks of a surgical biopsy include exposure to anesthesia, infection, excessive bleeding and injury to abdominal organs.  A benefit of surgical biopsy is the ability to see the area to be biopsied and remove of a larger piece of tissue.    THIS EDUCATION INFORMATION WAS REVIEWED PRIOR TO PROCEDURE AND CONSENT. Patient initials__________________Time____0656_______________    Post Procedure:    *  Expect the biopsy site may be tender up to one week.    *  Rest today (no pushing pulling or straining).   *  Slowly increase activity  tomorrow.    *  If you received sedation do not drive for 24 hours.   *  Keep dressing clean and dry.   *  Leave dressing on puncture site for 24 hours.    *  You may shower when dressing removed.    Call your doctor if experiencing:   *  Signs of infection such as redness, swelling, excessive pain and / or foul        smelling drainage from the puncture site.   *  Chills or fever over 101 degrees (by mouth).   *  Unrelieved pain.   *  Any new or severe symptoms.   *  If experiencing sudden / severe shortness of breath or chest pain go to the       nearest emergency room.     Following the procedure:     Follow-up with the ordering physician as directed.    Continue to take other medications as directed by your physician unless    otherwise instructed.   If applicable, resume taking your blood thinners or Aspirin on _7/28/18 after 1100________.      If you have any concerns please call the Radiology Nurses Desk at [phone].  You are the most important factor in your recovery.  Follow the above instructions carefully.

## 2018-07-30 ENCOUNTER — APPOINTMENT (OUTPATIENT)
Dept: WOMENS IMAGING | Facility: HOSPITAL | Age: 68
End: 2018-07-30

## 2018-07-30 PROCEDURE — 77063 BREAST TOMOSYNTHESIS BI: CPT | Performed by: RADIOLOGY

## 2018-07-30 PROCEDURE — 77067 SCR MAMMO BI INCL CAD: CPT | Performed by: RADIOLOGY

## 2018-08-10 ENCOUNTER — OFFICE VISIT (OUTPATIENT)
Dept: ONCOLOGY | Facility: CLINIC | Age: 68
End: 2018-08-10

## 2018-08-10 ENCOUNTER — APPOINTMENT (OUTPATIENT)
Dept: LAB | Facility: HOSPITAL | Age: 68
End: 2018-08-10

## 2018-08-10 VITALS
RESPIRATION RATE: 18 BRPM | OXYGEN SATURATION: 98 % | HEART RATE: 72 BPM | TEMPERATURE: 98.3 F | BODY MASS INDEX: 37.15 KG/M2 | DIASTOLIC BLOOD PRESSURE: 74 MMHG | SYSTOLIC BLOOD PRESSURE: 130 MMHG | WEIGHT: 217.6 LBS | HEIGHT: 64 IN

## 2018-08-10 DIAGNOSIS — Z94.0 RENAL TRANSPLANT RECIPIENT: ICD-10-CM

## 2018-08-10 DIAGNOSIS — C64.1 CARCINOMA OF RIGHT KIDNEY (HCC): ICD-10-CM

## 2018-08-10 DIAGNOSIS — C64.1 CARCINOMA OF RIGHT KIDNEY (HCC): Primary | ICD-10-CM

## 2018-08-10 DIAGNOSIS — C64.9 RENAL CELL CARCINOMA, UNSPECIFIED LATERALITY (HCC): Primary | ICD-10-CM

## 2018-08-10 LAB
BASOPHILS # BLD AUTO: 0.01 10*3/MM3 (ref 0–0.1)
BASOPHILS NFR BLD AUTO: 0.3 % (ref 0–1.1)
DEPRECATED RDW RBC AUTO: 41.2 FL (ref 37–49)
EOSINOPHIL # BLD AUTO: 0.22 10*3/MM3 (ref 0–0.36)
EOSINOPHIL NFR BLD AUTO: 6.5 % (ref 1–5)
ERYTHROCYTE [DISTWIDTH] IN BLOOD BY AUTOMATED COUNT: 12.6 % (ref 11.7–14.5)
HCT VFR BLD AUTO: 39.5 % (ref 34–45)
HGB BLD-MCNC: 12.5 G/DL (ref 11.5–14.9)
IMM GRANULOCYTES # BLD: 0.02 10*3/MM3 (ref 0–0.03)
IMM GRANULOCYTES NFR BLD: 0.6 % (ref 0–0.5)
LYMPHOCYTES # BLD AUTO: 1.23 10*3/MM3 (ref 1–3.5)
LYMPHOCYTES NFR BLD AUTO: 36.3 % (ref 20–49)
MCH RBC QN AUTO: 28.2 PG (ref 27–33)
MCHC RBC AUTO-ENTMCNC: 31.6 G/DL (ref 32–35)
MCV RBC AUTO: 89.2 FL (ref 83–97)
MONOCYTES # BLD AUTO: 0.48 10*3/MM3 (ref 0.25–0.8)
MONOCYTES NFR BLD AUTO: 14.2 % (ref 4–12)
NEUTROPHILS # BLD AUTO: 1.43 10*3/MM3 (ref 1.5–7)
NEUTROPHILS NFR BLD AUTO: 42.1 % (ref 39–75)
NRBC BLD MANUAL-RTO: 0 /100 WBC (ref 0–0)
PLATELET # BLD AUTO: 148 10*3/MM3 (ref 150–375)
PMV BLD AUTO: 9.8 FL (ref 8.9–12.1)
RBC # BLD AUTO: 4.43 10*6/MM3 (ref 3.9–5)
WBC NRBC COR # BLD: 3.39 10*3/MM3 (ref 4–10)

## 2018-08-10 PROCEDURE — 85025 COMPLETE CBC W/AUTO DIFF WBC: CPT | Performed by: INTERNAL MEDICINE

## 2018-08-10 PROCEDURE — 99213 OFFICE O/P EST LOW 20 MIN: CPT | Performed by: INTERNAL MEDICINE

## 2018-08-10 PROCEDURE — 36416 COLLJ CAPILLARY BLOOD SPEC: CPT | Performed by: INTERNAL MEDICINE

## 2018-08-10 NOTE — PROGRESS NOTES
Subjective .     REASONS FOR FOLLOWUP:  1.  History of renal cell carcinoma with now sclerotic lesions at T8 and T7 vertebral body.    HISTORY OF PRESENT ILLNESS:  The patient is a 68 y.o. year old female who is here for follow-up with the above-mentioned history.    History of Present Illness  patient has history of renal cell cancer status post right nephrectomy 2008, history of kidney transplant times 217-1995 and second one in 2011.  She had evidence of a CT scan showing sclerosis at T7-T8 vertebrae.  We have scheduled her for CT-guided bone biopsy at T8 but the radiologist called and said that it was too small to biopsy.  And hence we will need to repeat CT scan in 3 months.  She also had anemia workup.  Iron studies, B12 and folate were normal.    Past Medical History:   Diagnosis Date   • Abnormal glucose    • Acute bronchitis    • Acute upper respiratory infection    • C. 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 (CMS/HCC)     Declared viral free in Sept '14  Dr Julio Serna  [facility]   • Cellulitis of foot    • Cerumen impaction    • Cerumen impaction    • Charcot's joint    • Depression with anxiety    • Diabetes mellitus (CMS/HCC)    • Disease of thyroid gland    • Failed kidney transplant    • Gastric ulcer    • GERD (gastroesophageal reflux disease)    • Gout    • Graves disease    • Heart murmur    • Hepatitis C    • Hyperlipidemia    • Hypertension    • Hypothyroidism    • Insomnia    • Kidney carcinoma (CMS/HCC)    • Kidney failure    • MR (mitral regurgitation)     moderate, with cunningham dysfctn, severe LAE, mod OLENA, aortic thickening without insuff- Dr Horn   • Neuropathy    • Osteomyelitis of toe (CMS/HCC)    • Osteoporosis    • Peripheral edema    • Pes planus    • Subconjunctival hemorrhage    • Transplant recipient 1995, 2011    BILATERAL KIDNEYS   • Type 2 diabetes mellitus with diabetic mononeuropathy (CMS/HCC)        ONCOLOGIC HISTORY:  (History from  previous dates can be found in the separate document.)  The patient is a 68 y.o. year old female who is here for an opinion about the above issue.     History of Present Illness patient is a 68-year-old female with history of renal transplant ×2 is here for follow-up she has had history of renal cell cancer back in 2008 and has undergone right nephrectomy by Dr. Alberto Myers.  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  Her first transplant was back in 1995.  She has been on Prograf, immunosuppressive therapy and doing well.  She states that recently the urologist had done a CT scan     CT scan of the chest abdomen pelvis done June 21, 2018 showed some healed rib fractures.  There was evidence of new sclerosis and erosions in the right T8 superior and right T7 inferior end plates.  No suspicious or pulmonary parenchymal finding is seen then at your left kidney is atrophic and measures 56 mm.     Given the new sclerosis in the right side of T7 and T8 vertebrae and MRI was suggested with contrast.  There is abnormal activity in the midline of T8 at the midthoracic spine probably the bone scan the abnormal activity at T8 is present.     Patient's creatinine is 0.77.  Given that she has had 2 kidney transplants I'm concerned about doing an MRI with contrast.  Patient states that she hasn't lost weight and she is got a very good appetite.  She doesn't have any back pain.     Current Outpatient Prescriptions on File Prior to Visit   Medication Sig Dispense Refill   • ACCU-CHEK FASTCLIX LANCETS [facility] USE 1 LANCET FOUR TIMES A DAY     • BABY ASPIRIN PO Take 81 mg by mouth.     • carvedilol (COREG) 12.5 MG tablet Take 12.5 mg by mouth 2 (Two) Times a Day With Meals.     • Ergocalciferol (VITAMIN D2) 2000 UNITS tablet Take 1 tablet by mouth daily.     • escitalopram (LEXAPRO) 20 MG tablet TAKE 1 TABLET DAILY 90 tablet 1   • esomeprazole (NexIUM) 40 MG capsule  Take 40 mg by mouth every morning before breakfast.     • glucose blood (ACCU-CHEK SMARTVIEW) test strip      • glucose blood (ACCU-CHEK SMARTVIEW) test strip      • insulin detemir (LEVEMIR FLEXTOUCH) 100 UNIT/ML injection INJECT 5 UNITS UNDER THE SKIN NIGHTLY     • insulin lispro (humaLOG) 100 UNIT/ML injection Inject 3 Units under the skin.     • Insulin Pen Needle (BD PEN NEEDLE KYLAH U/F) 32G X 4 MM misc USE 1 PEN NEEDLE FOUR TIMES A DAY     • Insulin Pen Needle 32G X 4 MM misc Inject 1 each under the skin.     • losartan (COZAAR) 100 MG tablet Take 100 mg by mouth daily.     • Multiple Vitamins-Minerals (MULTI COMPLETE PO) Take  by mouth.     • mycophenolate (MYFORTIC) 360 MG tablet delayed-release EC tablet Take 2 tablets by mouth 2 (two) times a day.     • rosuvastatin (CRESTOR) 40 MG tablet TAKE 1 TABLET DAILY ( FUTURE APPOINTMENT 10/20/2015 )     • tacrolimus (PROGRAF) 1 MG capsule Take 1 mg by mouth 2 (two) times a day. 2 CAPS EACH DOSE       No current facility-administered medications on file prior to visit.        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     Review of Systems  A comprehensive 14 point review of systems was performed and was negative except as mentioned.    Objective      There were no vitals filed  for this visit.  Current Status 7/23/2018   ECOG score 0       Physical Exam      GENERAL:  Well-developed, well-nourished in no acute distress.   SKIN:  Warm, dry without rashes, purpura or petechiae.  EYES:  Pupils equal, round and reactive to light.  EOMs intact.  Conjunctivae normal.  EARS:  Hearing intact.  NOSE:  Septum midline.  No excoriations or nasal discharge.  MOUTH:  Tongue is well-papillated; no stomatitis or ulcers.  Lips normal.  THROAT:  Oropharynx without lesions or exudates.  NECK:  Supple with good range of motion; no thyromegaly or masses, no JVD.  LYMPHATICS:  No cervical, supraclavicular, axillary or inguinal adenopathy.  CHEST:  Lungs clear to auscultation. Good airflow.  CARDIAC:  Regular rate and rhythm without murmurs, rubs or gallops. Normal S1,S2.  ABDOMEN:  Soft, nontender with no hepatosplenomegaly or masses.  EXTREMITIES:  No clubbing, cyanosis or edema.  NEUROLOGICAL:  Cranial Nerves II-XII grossly intact.  No focal neurological deficits.  PSYCHIATRIC:  Normal affect and mood.        RECENT LABS:  Hematology WBC   Date Value Ref Range Status   07/23/2018 3.74 (L) 4.00 - 10.00 10*3/mm3 Final     RBC   Date Value Ref Range Status   07/23/2018 4.59 3.90 - 5.00 10*6/mm3 Final     Hemoglobin   Date Value Ref Range Status   07/23/2018 13.3 11.5 - 14.9 g/dL Final     Hematocrit   Date Value Ref Range Status   07/23/2018 41.5 34.0 - 46.6 % Final   07/23/2018 40.6 34.0 - 45.0 % Final     Platelets   Date Value Ref Range Status   07/23/2018 138 (L) 150 - 375 10*3/mm3 Final        Assessment/Plan     1.  History of renal cell cancer status post right nephrectomy in 2008.  She is been followed with observation with CT scans.     2.  Status post kidney transplant ×2, initial one was in 1995 and there was rejection in 2008 and subsequently patient underwent a second kidney transplant in 2011.     3.  History of new onset sclerosis seen at thoracic 8 and thoracic 7 vertebrae on a routine CT scan of  the chest abdomen pelvis.  A bone scan was obtained which also shows evidence of abnormality on T8 vertebral body.  I'm unsure if this is malignancy or arthritis.  Given the history of renal cell carcinoma likely best to obtain a CT-guided biopsy of the lesion at the 8 vertebral body.  I did not order an MRI of the spine with contrast given that she has had 2 kidney transplants.  Patient is on immunosuppressive therapy  · Patient was scheduled for CT-guided biopsy of the T8 vertebral body sclerotic metastases lesion but it was too small to biopsy and repeat CT suggested.     4.  Anemia    Plan 1.  We'll obtain CT scan and bone scan in October 2018.    2.  Follow-up with me after scans done.    Yanni Saucedo MD                  Cc:  Alberto Myers MD

## 2018-08-21 ENCOUNTER — OFFICE VISIT (OUTPATIENT)
Dept: FAMILY MEDICINE CLINIC | Facility: CLINIC | Age: 68
End: 2018-08-21

## 2018-08-21 VITALS
HEIGHT: 64 IN | OXYGEN SATURATION: 99 % | WEIGHT: 215 LBS | DIASTOLIC BLOOD PRESSURE: 72 MMHG | HEART RATE: 77 BPM | BODY MASS INDEX: 36.7 KG/M2 | SYSTOLIC BLOOD PRESSURE: 128 MMHG

## 2018-08-21 DIAGNOSIS — L30.4 INTERTRIGO: Primary | ICD-10-CM

## 2018-08-21 DIAGNOSIS — F41.8 MIXED ANXIETY DEPRESSIVE DISORDER: ICD-10-CM

## 2018-08-21 DIAGNOSIS — R93.5 ABNORMAL CT OF THE ABDOMEN: ICD-10-CM

## 2018-08-21 DIAGNOSIS — G47.01 INSOMNIA DUE TO MEDICAL CONDITION: ICD-10-CM

## 2018-08-21 PROCEDURE — 99214 OFFICE O/P EST MOD 30 MIN: CPT | Performed by: FAMILY MEDICINE

## 2018-08-21 RX ORDER — LEVOTHYROXINE SODIUM 0.12 MG/1
132 TABLET ORAL DAILY
COMMUNITY
Start: 2018-08-20

## 2018-08-21 RX ORDER — NYSTATIN 100000 [USP'U]/G
POWDER TOPICAL 4 TIMES DAILY
COMMUNITY
End: 2019-04-08

## 2018-08-21 RX ORDER — CLOTRIMAZOLE AND BETAMETHASONE DIPROPIONATE 10; .5 MG/ML; MG/ML
LOTION TOPICAL EVERY 12 HOURS SCHEDULED
Qty: 30 ML | Refills: 2 | Status: SHIPPED | OUTPATIENT
Start: 2018-08-21 | End: 2019-04-08

## 2018-08-21 NOTE — PROGRESS NOTES
Subjective   Diana Gee is a 68 y.o. female.     Chief Complaint   Patient presents with   • Kidney Transplant     Discuss previous test and procedures         History of Present Illness  Skin irritation  New problem to me. Occurring in the area of skin folds in the abdomen. Trying to keep dry. Has tried powder from gynecologist. Helping. Sometimes gets area that are more intensely painful and red. Wondering about something for this area, she is using neosporin. Not really wanting cream because it is so messing. Area is doing well right now.      Mood   Well controlled. Says her mood is good. She is spending time with her new 7 month old great grandson. Decided not to do the school work this year. Had hard time with the kids anger, rough home lives, and pushing her and cussing at her. Decided she wasn't going to go through that again. Stable on her escitalopram 20 mg.    Sleep   This is going well. She has not had any more sleep issues. After she took care of her brother and he  she had a lot of wakeful nights and thinks it was related to her thyroid and now things are good and she is sleeping well again.    Kidney transplant  Patient had right nephrectomy secondary to renal cell carcinoma.  She had a kidney transplant in  and this was retracted in .  She had a second kidney transplant in . [place] team [place] , these notes and labs are on her chart.    Bone lesion  On 18 patient have follow-up with Dr. Alberto Myers with urology and reviewed her CT scan.  She had stable renal calculus and renal cysts on the left and a normal-looking transplanted right kidney.  There was a concerning lesion/sclerosis new in onset at the thoracic level vertebra 8.  She has history of renal cell carcinoma status post right nephrectomy in  and was sent back to her oncologist for evaluation.  Patient saw Dr. Saucedo 8/10/18 who advised she have a CT-guided biopsy given her  "history of RCC. Biopsy unable to be performed related to depth of lesion and safety. It is going to be reimaged 10/2018 and has follow up with onc.    DM  Sees Dr. Garcia with endocrine, A1c was 6.1% labs from 8/13. Thyroid medication also managed there.    The following portions of the patient's history were reviewed and updated as appropriate: allergies, current medications and problem list.    Review of Systems   Constitutional: Negative for activity change, appetite change and unexpected weight change.   Respiratory: Negative for shortness of breath.    Cardiovascular: Negative for chest pain.   Musculoskeletal: Negative for back pain and gait problem.   Psychiatric/Behavioral: Negative for dysphoric mood and sleep disturbance. The patient is not nervous/anxious.        Objective   Blood pressure 128/72, pulse 77, height 162 cm (63.78\"), weight 97.5 kg (215 lb), SpO2 99 %.  Physical Exam   Constitutional: She is oriented to person, place, and time. She appears well-nourished. No distress.   Eyes: Conjunctivae are normal. Right eye exhibits no discharge. Left eye exhibits no discharge. No scleral icterus.   Cardiovascular: Normal rate, regular rhythm, normal heart sounds and intact distal pulses.  Exam reveals no gallop and no friction rub.    No murmur heard.  Pulmonary/Chest: Effort normal and breath sounds normal. No respiratory distress. She has no wheezes.   Musculoskeletal: She exhibits no edema.   Neurological: She is alert and oriented to person, place, and time.   Psychiatric: She has a normal mood and affect. Her behavior is normal.   Vitals reviewed.      Assessment/Plan   Diana was seen today for kidney transplant.    Diagnoses and all orders for this visit:    Intertrigo  Will try combined lotion, pt wanting to avoid messy creams. Counseled on skin care and keeping skin dry. Apply gauze over lotion when needed for lesion. Lotion is for the red broken skin not everyday use given it contains steroid, " pt voiced understanding.  Mixed anxiety depressive disorder  Well controlled. Pt reports she is overall doing well though she is nervous about her lesions in the T spine, continue escitalopram.  Insomnia due to medical condition  Resolved. Pt sleeping well.  Abnormal CT of the abdomen  Reviewed notes and labs and reports from imaging done by her urologist and oncologist. She has follow up imaging scheduled.  Other orders  -     clotrimazole-betamethasone (LOTRISONE) 1-0.05 % lotion; Apply  topically to the appropriate area as directed Every 12 (Twelve) Hours.

## 2018-10-15 ENCOUNTER — HOSPITAL ENCOUNTER (OUTPATIENT)
Dept: NUCLEAR MEDICINE | Facility: HOSPITAL | Age: 68
Discharge: HOME | End: 2018-10-15
Attending: INTERNAL MEDICINE

## 2018-10-15 ENCOUNTER — HOSPITAL ENCOUNTER (OUTPATIENT)
Dept: CT IMAGING | Facility: HOSPITAL | Age: 68
Discharge: HOME | End: 2018-10-15
Attending: INTERNAL MEDICINE | Admitting: INTERNAL MEDICINE

## 2018-10-15 DIAGNOSIS — C64.9 RENAL CELL CARCINOMA, UNSPECIFIED LATERALITY (HCC): ICD-10-CM

## 2018-10-15 PROCEDURE — 0 TECHNETIUM MEDRONATE KIT: Performed by: INTERNAL MEDICINE

## 2018-10-15 PROCEDURE — 78306 BONE IMAGING WHOLE BODY: CPT

## 2018-10-15 PROCEDURE — A9503 TC99M MEDRONATE: HCPCS | Performed by: INTERNAL MEDICINE

## 2018-10-15 PROCEDURE — 74176 CT ABD & PELVIS W/O CONTRAST: CPT

## 2018-10-15 PROCEDURE — 71250 CT THORAX DX C-: CPT

## 2018-10-15 RX ORDER — TC 99M MEDRONATE 20 MG/10ML
21.4 INJECTION, POWDER, LYOPHILIZED, FOR SOLUTION INTRAVENOUS
Status: COMPLETED | OUTPATIENT
Start: 2018-10-15 | End: 2018-10-15

## 2018-10-15 RX ADMIN — Medication 21.4 MILLICURIE: at 09:10

## 2018-10-20 NOTE — PROGRESS NOTES
Subjective .     REASONS FOR FOLLOWUP:  1.  History of renal cell carcinoma with now sclerotic lesions at T8 and T7 vertebral body.    HISTORY OF PRESENT ILLNESS:  The patient is a 68 y.o. year old female who is here for follow-up with the above-mentioned history.    History of Present Illness  patient has history of renal cell cancer status post right nephrectomy 2008, history of kidney transplant times 217-1995 and second one in 2011.  She had evidence of a CT scan showing sclerosis at T7-T8 vertebrae.  We have scheduled her for CT-guided bone biopsy at T8 but the radiologist called and said that it was too small to biopsy.  And hence we will need to repeat CT scan in 3 months.  She also had anemia workup.  Iron studies, B12 and folate were normal.    Interval history: Patient had repeat CT scan and bone scan October 2018 to follow up on the questionable lesions on the T7 and T8.  Bone scan was negative it was thought to be arthritis on the bone scan.  CT scan also was thought to be degenerative in nature.    Past Medical History:   Diagnosis Date   • Abnormal glucose    • Acute bronchitis    • Acute upper respiratory infection    • C. difficile diarrhea 2008    [facility]   • Carrier of viral hepatitis C (CMS/HCC)     Declared viral free in Sept '14  Dr Julio Serna  [facility]   • Cellulitis of foot    • Cerumen impaction    • Cerumen impaction    • Charcot's joint    • Depression with anxiety    • Diabetes mellitus (CMS/HCC)    • Disease of thyroid gland    • Failed kidney transplant    • Gastric ulcer    • GERD (gastroesophageal reflux disease)    • Gout    • Graves disease    • Heart murmur    • Hepatitis C    • Hyperlipidemia    • Hypertension    • Hypothyroidism    • Insomnia    • Kidney carcinoma (CMS/HCC)    • Kidney failure    • MR (mitral regurgitation)     moderate, with cunningham dysfctn, severe LAE, mod OLENA, aortic thickening without insuff- Dr Horn   • Neuropathy    • Osteomyelitis of toe  (CMS/[facility])    • Osteoporosis    • Peripheral edema    • Pes planus    • Subconjunctival hemorrhage    • Transplant recipient 1995, 2011    BILATERAL KIDNEYS   • Type 2 diabetes mellitus with diabetic mononeuropathy (CMS/[facility])        ONCOLOGIC HISTORY:  (History from previous dates can be found in the separate document.)  The patient is a 68 y.o. year old female who is here for an opinion about the above issue.     History of Present Illness patient is a 68-year-old female with history of renal transplant ×2 is here for follow-up she has had history of renal cell cancer back in 2008 and has undergone right nephrectomy by Dr. Alberto Myers.  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  Her first transplant was back in 1995.  She has been on Prograf, immunosuppressive therapy and doing well.  She states that recently the urologist had done a CT scan     CT scan of the chest abdomen pelvis done June 21, 2018 showed some healed rib fractures.  There was evidence of new sclerosis and erosions in the right T8 superior and right T7 inferior end plates.  No suspicious or pulmonary parenchymal finding is seen then at your left kidney is atrophic and measures 56 mm.     Given the new sclerosis in the right side of T7 and T8 vertebrae and MRI was suggested with contrast.  There is abnormal activity in the midline of T8 at the midthoracic spine probably the bone scan the abnormal activity at T8 is present.     Patient's creatinine is 0.77.  Given that she has had 2 kidney transplants I'm concerned about doing an MRI with contrast.  Patient states that she hasn't lost weight and she is got a very good appetite.  She doesn't have any back pain.     Current Outpatient Prescriptions on File Prior to Visit   Medication Sig Dispense Refill   • ACCU-CHEK FASTCLIX LANCETS [facility] USE 1 LANCET FOUR TIMES A DAY     • BABY ASPIRIN PO Take 81 mg by mouth.     • carvedilol (COREG)  12.5 MG tablet Take 12.5 mg by mouth 2 (Two) Times a Day With Meals.     • clotrimazole-betamethasone (LOTRISONE) 1-0.05 % lotion Apply  topically to the appropriate area as directed Every 12 (Twelve) Hours. 30 mL 2   • Ergocalciferol (VITAMIN D2) 2000 UNITS tablet Take 1 tablet by mouth daily.     • escitalopram (LEXAPRO) 20 MG tablet TAKE 1 TABLET DAILY 90 tablet 1   • esomeprazole (NexIUM) 40 MG capsule Take 40 mg by mouth every morning before breakfast.     • glucose blood (ACCU-CHEK SMARTVIEW) test strip      • glucose blood (ACCU-CHEK SMARTVIEW) test strip      • insulin detemir (LEVEMIR FLEXTOUCH) 100 UNIT/ML injection INJECT 5 UNITS UNDER THE SKIN NIGHTLY     • insulin lispro (humaLOG) 100 UNIT/ML injection Inject 3 Units under the skin.     • Insulin Pen Needle (BD PEN NEEDLE KYLAH U/F) 32G X 4 MM misc USE 1 PEN NEEDLE FOUR TIMES A DAY     • Insulin Pen Needle 32G X 4 MM misc Inject 1 each under the skin.     • levothyroxine (SYNTHROID, LEVOTHROID) 125 MCG tablet Take 125 mcg by mouth Daily.     • losartan (COZAAR) 100 MG tablet Take 100 mg by mouth daily.     • Multiple Vitamins-Minerals (MULTI COMPLETE PO) Take  by mouth.     • mycophenolate (MYFORTIC) 360 MG tablet delayed-release EC tablet Take 2 tablets by mouth 2 (two) times a day.     • nystatin (MYCOSTATIN) 935318 UNIT/GM powder Apply  topically to the appropriate area as directed 4 (Four) Times a Day.     • rosuvastatin (CRESTOR) 40 MG tablet TAKE 1 TABLET DAILY ( FUTURE APPOINTMENT 10/20/2015 )     • tacrolimus (PROGRAF) 1 MG capsule Take 1 mg by mouth 2 (two) times a day. 2 CAPS EACH DOSE       No current facility-administered medications on file prior to visit.        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     Review of Systems  A comprehensive 14 point review of systems was performed and was negative except as mentioned.    Objective      There were no vitals filed for this visit.  Current Status 8/10/2018   ECOG score 0       Physical Exam      GENERAL:  Well-developed, well-nourished in no acute distress.   SKIN:  Warm, dry without rashes, purpura or petechiae.  EYES:  Pupils equal, round and reactive to light.  EOMs intact.  Conjunctivae normal.  EARS:  Hearing intact.  NOSE:  Septum midline.  No excoriations or nasal discharge.  MOUTH:  Tongue is well-papillated; no stomatitis or ulcers.  Lips normal.  THROAT:  Oropharynx without lesions or exudates.  NECK:  Supple with good range of motion; no thyromegaly or masses, no JVD.  LYMPHATICS:  No cervical, supraclavicular, axillary or inguinal adenopathy.  CHEST:  Lungs clear to auscultation. Good airflow.  CARDIAC:  Regular rate and rhythm without murmurs, rubs or gallops. Normal S1,S2.  ABDOMEN:  Soft, nontender with no hepatosplenomegaly or masses.  EXTREMITIES:  No clubbing, cyanosis or edema.  NEUROLOGICAL:  Cranial Nerves II-XII grossly intact.  No focal neurological deficits.  PSYCHIATRIC:  Normal affect and mood.    I have examined the patient and there is no change as of October 24, 2080    RECENT LABS:  Hematology WBC   Date Value Ref Range Status   08/10/2018 3.39 (L) 4.00 - 10.00 10*3/mm3 Final     RBC   Date Value Ref Range Status   08/10/2018 4.43 3.90 - 5.00 10*6/mm3 Final     Hemoglobin   Date  Value Ref Range Status   08/10/2018 12.5 11.5 - 14.9 g/dL Final     Hematocrit   Date Value Ref Range Status   08/10/2018 39.5 34.0 - 45.0 % Final     Platelets   Date Value Ref Range Status   08/10/2018 148 (L) 150 - 375 10*3/mm3 Final        Assessment/Plan     1.  History of renal cell cancer status post right nephrectomy in 2008.  She is been followed with observation with CT scans.     2.  Status post kidney transplant ×2, initial one was in 1995 and there was rejection in 2008 and subsequently patient underwent a second kidney transplant in 2011.     3.  History of new onset sclerosis seen at thoracic 8 and thoracic 7 vertebrae on a routine CT scan of the chest abdomen pelvis.  A bone scan was obtained which also shows evidence of abnormality on T8 vertebral body.  I'm unsure if this is malignancy or arthritis.  Given the history of renal cell carcinoma likely best to obtain a CT-guided biopsy of the lesion at the 8 vertebral body.  I did not order an MRI of the spine with contrast given that she has had 2 kidney transplants.  Patient is on immunosuppressive therapy  · Patient was scheduled for CT-guided biopsy of the T8 vertebral body sclerotic metastases lesion but it was too small to biopsy and repeat CT suggested.  · Repeat CT scan and bone scan shows that the T7-T8 lesion is more degenerative in nature.     4.  Anemia    Plan 1.  We'll obtain CT scan and bone scan in October 2018.    2.  Follow-up with me after scans done.    Yanni Saucedo MD                  Cc:  Alberto Myers MD

## 2018-10-22 ENCOUNTER — OFFICE VISIT (OUTPATIENT)
Dept: ONCOLOGY | Facility: CLINIC | Age: 68
End: 2018-10-22

## 2018-10-22 ENCOUNTER — LAB (OUTPATIENT)
Dept: LAB | Facility: HOSPITAL | Age: 68
End: 2018-10-22

## 2018-10-22 VITALS
SYSTOLIC BLOOD PRESSURE: 138 MMHG | HEIGHT: 64 IN | HEART RATE: 71 BPM | DIASTOLIC BLOOD PRESSURE: 90 MMHG | TEMPERATURE: 98.3 F | RESPIRATION RATE: 16 BRPM | BODY MASS INDEX: 36.43 KG/M2 | OXYGEN SATURATION: 95 % | WEIGHT: 213.4 LBS

## 2018-10-22 DIAGNOSIS — C64.9 RENAL CELL CARCINOMA, UNSPECIFIED LATERALITY (HCC): ICD-10-CM

## 2018-10-22 DIAGNOSIS — C64.1 CARCINOMA OF RIGHT KIDNEY (HCC): ICD-10-CM

## 2018-10-22 DIAGNOSIS — Z94.0 RENAL TRANSPLANT RECIPIENT: Primary | ICD-10-CM

## 2018-10-22 LAB
ALBUMIN SERPL-MCNC: 3.8 G/DL (ref 3.5–5.2)
ALBUMIN/GLOB SERPL: 1.2 G/DL (ref 1.1–2.4)
ALP SERPL-CCNC: 83 U/L (ref 38–116)
ALT SERPL W P-5'-P-CCNC: 21 U/L (ref 0–33)
ANION GAP SERPL CALCULATED.3IONS-SCNC: 9 MMOL/L
AST SERPL-CCNC: 24 U/L (ref 0–32)
BASOPHILS # BLD AUTO: 0.02 10*3/MM3 (ref 0–0.1)
BASOPHILS NFR BLD AUTO: 0.5 % (ref 0–1.1)
BILIRUB SERPL-MCNC: 0.2 MG/DL (ref 0.1–1.2)
BUN BLD-MCNC: 22 MG/DL (ref 6–20)
BUN/CREAT SERPL: 27.8 (ref 7.3–30)
CALCIUM SPEC-SCNC: 9 MG/DL (ref 8.5–10.2)
CHLORIDE SERPL-SCNC: 106 MMOL/L (ref 98–107)
CO2 SERPL-SCNC: 26 MMOL/L (ref 22–29)
CREAT BLD-MCNC: 0.79 MG/DL (ref 0.6–1.1)
DEPRECATED RDW RBC AUTO: 41.8 FL (ref 37–49)
EOSINOPHIL # BLD AUTO: 0.19 10*3/MM3 (ref 0–0.36)
EOSINOPHIL NFR BLD AUTO: 4.9 % (ref 1–5)
ERYTHROCYTE [DISTWIDTH] IN BLOOD BY AUTOMATED COUNT: 13 % (ref 11.7–14.5)
GFR SERPL CREATININE-BSD FRML MDRD: 88 ML/MIN/1.73
GLOBULIN UR ELPH-MCNC: 3.3 GM/DL (ref 1.8–3.5)
GLUCOSE BLD-MCNC: 90 MG/DL (ref 74–124)
HCT VFR BLD AUTO: 39.3 % (ref 34–45)
HGB BLD-MCNC: 12.3 G/DL (ref 11.5–14.9)
IMM GRANULOCYTES # BLD: 0.01 10*3/MM3 (ref 0–0.03)
IMM GRANULOCYTES NFR BLD: 0.3 % (ref 0–0.5)
LYMPHOCYTES # BLD AUTO: 1.52 10*3/MM3 (ref 1–3.5)
LYMPHOCYTES NFR BLD AUTO: 39.2 % (ref 20–49)
MCH RBC QN AUTO: 27.8 PG (ref 27–33)
MCHC RBC AUTO-ENTMCNC: 31.3 G/DL (ref 32–35)
MCV RBC AUTO: 88.7 FL (ref 83–97)
MONOCYTES # BLD AUTO: 0.53 10*3/MM3 (ref 0.25–0.8)
MONOCYTES NFR BLD AUTO: 13.7 % (ref 4–12)
NEUTROPHILS # BLD AUTO: 1.61 10*3/MM3 (ref 1.5–7)
NEUTROPHILS NFR BLD AUTO: 41.4 % (ref 39–75)
NRBC BLD MANUAL-RTO: 0 /100 WBC (ref 0–0)
PLATELET # BLD AUTO: 173 10*3/MM3 (ref 150–375)
PMV BLD AUTO: 9 FL (ref 8.9–12.1)
POTASSIUM BLD-SCNC: 4.4 MMOL/L (ref 3.5–4.7)
PROT SERPL-MCNC: 7.1 G/DL (ref 6.3–8)
RBC # BLD AUTO: 4.43 10*6/MM3 (ref 3.9–5)
SODIUM BLD-SCNC: 141 MMOL/L (ref 134–145)
WBC NRBC COR # BLD: 3.88 10*3/MM3 (ref 4–10)

## 2018-10-22 PROCEDURE — 99214 OFFICE O/P EST MOD 30 MIN: CPT | Performed by: INTERNAL MEDICINE

## 2018-10-22 PROCEDURE — 80053 COMPREHEN METABOLIC PANEL: CPT | Performed by: INTERNAL MEDICINE

## 2018-10-22 PROCEDURE — 36415 COLL VENOUS BLD VENIPUNCTURE: CPT | Performed by: INTERNAL MEDICINE

## 2018-10-22 PROCEDURE — 85025 COMPLETE CBC W/AUTO DIFF WBC: CPT | Performed by: INTERNAL MEDICINE

## 2018-12-12 RX ORDER — ESCITALOPRAM OXALATE 20 MG/1
TABLET ORAL
Qty: 90 TABLET | Refills: 1 | Status: SHIPPED | OUTPATIENT
Start: 2018-12-12 | End: 2019-05-29 | Stop reason: DRUGHIGH

## 2019-04-08 ENCOUNTER — OFFICE VISIT (OUTPATIENT)
Dept: ONCOLOGY | Facility: CLINIC | Age: 69
End: 2019-04-08

## 2019-04-08 ENCOUNTER — LAB (OUTPATIENT)
Dept: LAB | Facility: HOSPITAL | Age: 69
End: 2019-04-08

## 2019-04-08 VITALS
DIASTOLIC BLOOD PRESSURE: 73 MMHG | WEIGHT: 220.7 LBS | TEMPERATURE: 98.3 F | HEART RATE: 75 BPM | BODY MASS INDEX: 37.68 KG/M2 | SYSTOLIC BLOOD PRESSURE: 149 MMHG | HEIGHT: 64 IN | RESPIRATION RATE: 18 BRPM | OXYGEN SATURATION: 93 %

## 2019-04-08 DIAGNOSIS — Z94.0 RENAL TRANSPLANT RECIPIENT: ICD-10-CM

## 2019-04-08 DIAGNOSIS — C64.1 CARCINOMA OF RIGHT KIDNEY (HCC): Primary | ICD-10-CM

## 2019-04-08 DIAGNOSIS — C64.1 CARCINOMA OF RIGHT KIDNEY (HCC): ICD-10-CM

## 2019-04-08 LAB
ALBUMIN SERPL-MCNC: 3.9 G/DL (ref 3.5–5.2)
ALBUMIN/GLOB SERPL: 1.1 G/DL (ref 1.1–2.4)
ALP SERPL-CCNC: 88 U/L (ref 38–116)
ALT SERPL W P-5'-P-CCNC: 15 U/L (ref 0–33)
ANION GAP SERPL CALCULATED.3IONS-SCNC: 12 MMOL/L
AST SERPL-CCNC: 24 U/L (ref 0–32)
BASOPHILS # BLD AUTO: 0.02 10*3/MM3 (ref 0–0.2)
BASOPHILS NFR BLD AUTO: 0.6 % (ref 0–1.5)
BILIRUB SERPL-MCNC: 0.4 MG/DL (ref 0.2–1.2)
BUN BLD-MCNC: 26 MG/DL (ref 6–20)
BUN/CREAT SERPL: 30.2 (ref 7.3–30)
CALCIUM SPEC-SCNC: 9.4 MG/DL (ref 8.5–10.2)
CHLORIDE SERPL-SCNC: 103 MMOL/L (ref 98–107)
CO2 SERPL-SCNC: 26 MMOL/L (ref 22–29)
CREAT BLD-MCNC: 0.86 MG/DL (ref 0.6–1.1)
DEPRECATED RDW RBC AUTO: 43.8 FL (ref 37–54)
EOSINOPHIL # BLD AUTO: 0.17 10*3/MM3 (ref 0–0.4)
EOSINOPHIL NFR BLD AUTO: 5.2 % (ref 0.3–6.2)
ERYTHROCYTE [DISTWIDTH] IN BLOOD BY AUTOMATED COUNT: 13.1 % (ref 12.3–15.4)
GFR SERPL CREATININE-BSD FRML MDRD: 80 ML/MIN/1.73
GLOBULIN UR ELPH-MCNC: 3.5 GM/DL (ref 1.8–3.5)
GLUCOSE BLD-MCNC: 109 MG/DL (ref 74–124)
HCT VFR BLD AUTO: 39.4 % (ref 34–46.6)
HGB BLD-MCNC: 12.2 G/DL (ref 12–15.9)
IMM GRANULOCYTES # BLD AUTO: 0.01 10*3/MM3 (ref 0–0.05)
IMM GRANULOCYTES NFR BLD AUTO: 0.3 % (ref 0–0.5)
LYMPHOCYTES # BLD AUTO: 1.33 10*3/MM3 (ref 0.7–3.1)
LYMPHOCYTES NFR BLD AUTO: 40.4 % (ref 19.6–45.3)
MCH RBC QN AUTO: 28.5 PG (ref 26.6–33)
MCHC RBC AUTO-ENTMCNC: 31 G/DL (ref 31.5–35.7)
MCV RBC AUTO: 92.1 FL (ref 79–97)
MONOCYTES # BLD AUTO: 0.44 10*3/MM3 (ref 0.1–0.9)
MONOCYTES NFR BLD AUTO: 13.4 % (ref 5–12)
NEUTROPHILS # BLD AUTO: 1.32 10*3/MM3 (ref 1.4–7)
NEUTROPHILS NFR BLD AUTO: 40.1 % (ref 42.7–76)
NRBC BLD AUTO-RTO: 0 /100 WBC (ref 0–0)
PLATELET # BLD AUTO: 165 10*3/MM3 (ref 140–450)
PMV BLD AUTO: 9.2 FL (ref 6–12)
POTASSIUM BLD-SCNC: 4.3 MMOL/L (ref 3.5–4.7)
PROT SERPL-MCNC: 7.4 G/DL (ref 6.3–8)
RBC # BLD AUTO: 4.28 10*6/MM3 (ref 3.77–5.28)
SODIUM BLD-SCNC: 141 MMOL/L (ref 134–145)
WBC NRBC COR # BLD: 3.29 10*3/MM3 (ref 3.4–10.8)

## 2019-04-08 PROCEDURE — 99213 OFFICE O/P EST LOW 20 MIN: CPT | Performed by: INTERNAL MEDICINE

## 2019-04-08 PROCEDURE — 80053 COMPREHEN METABOLIC PANEL: CPT | Performed by: INTERNAL MEDICINE

## 2019-04-08 PROCEDURE — 36415 COLL VENOUS BLD VENIPUNCTURE: CPT | Performed by: INTERNAL MEDICINE

## 2019-04-08 PROCEDURE — 85025 COMPLETE CBC W/AUTO DIFF WBC: CPT | Performed by: INTERNAL MEDICINE

## 2019-04-08 RX ORDER — IRBESARTAN 150 MG/1
150 TABLET ORAL DAILY
COMMUNITY
End: 2021-04-27

## 2019-04-08 NOTE — PROGRESS NOTES
Subjective .     REASONS FOR FOLLOWUP:  1.  History of renal cell carcinoma with now sclerotic lesions at T8 and T7 vertebral body.    HISTORY OF PRESENT ILLNESS:  The patient is a 69 y.o. year old female who is here for follow-up with the above-mentioned history.    History of Present Illness  patient has history of renal cell cancer status post right nephrectomy 2008, history of kidney transplant times 217-1995 and second one in 2011.  She had evidence of a CT scan showing sclerosis at T7-T8 vertebrae.  We have scheduled her for CT-guided bone biopsy at T8 but the radiologist called and said that it was too small to biopsy.  And hence we will need to repeat CT scan in 3 months.  She also had anemia workup.  Iron studies, B12 and folate were normal.    Interval history: Patient had repeat CT scan and bone scan October 2018 to follow up on the questionable lesions on the T7 and T8.  Bone scan was negative it was thought to be arthritis on the bone scan.  CT scan also was thought to be degenerative in nature.    Patient denies any new complaints.  She has chronic mild low back pain.  It is unchanged.  She has not lost weight she has got a good appetite.  She follows up with the transplant physician at [facility].    Past Medical History:   Diagnosis Date   • Abnormal glucose    • Acute bronchitis    • Acute upper respiratory infection    • C. 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 (CMS/HCC)     Declared viral free in Sept '14  Dr Julio Serna  [facility]   • Cellulitis of foot    • Cerumen impaction    • Cerumen impaction    • Charcot's joint    • Depression with anxiety    • Diabetes mellitus (CMS/HCC)    • Disease of thyroid gland    • Failed kidney transplant    • Gastric ulcer    • GERD (gastroesophageal reflux disease)    • Gout    • Graves disease    • Heart murmur    • Hepatitis C    • Hyperlipidemia    • Hypertension    • Hypothyroidism    • Insomnia    • Kidney carcinoma  (CMS/HCC)    • Kidney failure    • MR (mitral regurgitation)     moderate, with cunningham dysfctn, severe LAE, mod OLENA, aortic thickening without insuff- Dr Horn   • Neuropathy    • Osteomyelitis of toe (CMS/HCC)    • Osteoporosis    • Peripheral edema    • Pes planus    • Subconjunctival hemorrhage    • Transplant recipient 1995, 2011    BILATERAL KIDNEYS   • Type 2 diabetes mellitus with diabetic mononeuropathy (CMS/HCC)        ONCOLOGIC HISTORY:  (History from previous dates can be found in the separate document.)  The patient is a 68 y.o. year old female who is here for an opinion about the above issue.     History of Present Illness patient is a 68-year-old female with history of renal transplant ×2 is here for follow-up she has had history of renal cell cancer back in 2008 and has undergone right nephrectomy by Dr. Alberto Myers.  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  Her first transplant was back in 1995.  She has been on Prograf, immunosuppressive therapy and doing well.  She states that recently the urologist had done a CT scan     CT scan of the chest abdomen pelvis done June 21, 2018 showed some healed rib fractures.  There was evidence of new sclerosis and erosions in the right T8 superior and right T7 inferior end plates.  No suspicious or pulmonary parenchymal finding is seen then at your left kidney is atrophic and measures 56 mm.     Given the new sclerosis in the right side of T7 and T8 vertebrae and MRI was suggested with contrast.  There is abnormal activity in the midline of T8 at the midthoracic spine probably the bone scan the abnormal activity at T8 is present.     Patient's creatinine is 0.77.  Given that she has had 2 kidney transplants I'm concerned about doing an MRI with contrast.  Patient states that she hasn't lost weight and she is got a very good appetite.  She doesn't have any back pain.     Current Outpatient  Medications on File Prior to Visit   Medication Sig Dispense Refill   • ACCU-CHEK FASTCLIX LANCETS misc USE 1 LANCET FOUR TIMES A DAY     • BABY ASPIRIN PO Take 81 mg by mouth.     • carvedilol (COREG) 12.5 MG tablet Take 25 mg by mouth 2 (Two) Times a Day With Meals.     • Ergocalciferol (VITAMIN D2) 2000 UNITS tablet Take 1 tablet by mouth daily.     • escitalopram (LEXAPRO) 20 MG tablet TAKE 1 TABLET DAILY 90 tablet 1   • esomeprazole (NexIUM) 40 MG capsule Take 40 mg by mouth every morning before breakfast.     • glucose blood (ACCU-CHEK SMARTVIEW) test strip      • glucose blood (ACCU-CHEK SMARTVIEW) test strip      • insulin detemir (LEVEMIR FLEXTOUCH) 100 UNIT/ML injection INJECT 5 UNITS UNDER THE SKIN NIGHTLY     • insulin lispro (humaLOG) 100 UNIT/ML injection Inject 3 Units under the skin.     • Insulin Pen Needle (BD PEN NEEDLE KYLAH U/F) 32G X 4 MM misc USE 1 PEN NEEDLE FOUR TIMES A DAY     • Insulin Pen Needle 32G X 4 MM misc Inject 1 each under the skin.     • irbesartan (AVAPRO) 150 MG tablet Take  by mouth Daily.     • levothyroxine (SYNTHROID, LEVOTHROID) 125 MCG tablet Take 125 mcg by mouth Daily.     • Multiple Vitamins-Minerals (MULTI COMPLETE PO) Take  by mouth.     • mycophenolate (MYFORTIC) 360 MG tablet delayed-release EC tablet Take 2 tablets by mouth 2 (two) times a day.     • rosuvastatin (CRESTOR) 40 MG tablet TAKE 1 TABLET DAILY ( FUTURE APPOINTMENT 10/20/2015 )     • tacrolimus (PROGRAF) 1 MG capsule Take 1 mg by mouth 2 (two) times a day. 2 CAPS EACH DOSE     • clotrimazole-betamethasone (LOTRISONE) 1-0.05 % lotion Apply  topically to the appropriate area as directed Every 12 (Twelve) Hours. 30 mL 2   • losartan (COZAAR) 100 MG tablet Take 100 mg by mouth daily.     • nystatin (MYCOSTATIN) 391843 UNIT/GM powder Apply  topically to the appropriate area as directed 4 (Four) Times a Day.       No current facility-administered medications on file prior to visit.        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     Review of Systems   Constitutional: Negative for appetite change, chills, diaphoresis, fatigue, fever and unexpected weight change.   HENT: Negative for hearing loss, sore throat and trouble swallowing.    Respiratory: Negative for cough, chest tightness, shortness of breath and wheezing.    Cardiovascular: Negative for chest pain, palpitations and leg swelling.   Gastrointestinal: Negative for abdominal distention, abdominal pain, constipation, diarrhea, nausea and vomiting.   Genitourinary: Negative for dysuria, frequency, hematuria and urgency.   Musculoskeletal: Positive for back pain (19-Same). Negative for joint swelling.        No muscle weakness.   Skin: Negative for rash and wound.        New raised mole Rt shoulder.   Neurological: Negative for seizures, syncope, speech difficulty, weakness, numbness and headaches.   Hematological: Negative for adenopathy. Does not bruise/bleed easily.   Psychiatric/Behavioral: Negative for behavioral problems, confusion and suicidal ideas.     A comprehensive 14 point review of systems was performed and was negative except as mentioned.    Objective      Vitals:    19 1459   BP:  "149/73   Pulse: 75   Resp: 18   Temp: 98.3 °F (36.8 °C)   TempSrc: Oral   SpO2: 93%   Weight: 100 kg (220 lb 11.2 oz)   Height: 162 cm (63.78\")   PainSc:   3   PainLoc: Back     Current Status 10/22/2018   ECOG score 0       Physical Exam      GENERAL:  Well-developed, well-nourished in no acute distress.   NECK:  Supple with good range of motion; no thyromegaly or masses, no JVD.  LYMPHATICS:  No cervical, supraclavicular, axillary or inguinal adenopathy.  CHEST:  Lungs clear to auscultation. Good airflow.  CARDIAC:  Regular rate and rhythm without murmurs, rubs or gallops. Normal S1,S2.  ABDOMEN:  Soft, nontender with no hepatosplenomegaly or masses.  EXTREMITIES:  No clubbing, cyanosis or edema.  NEUROLOGICAL:  Cranial Nerves II-XII grossly intact.  No focal neurological deficits.  PSYCHIATRIC:  Normal affect and mood.    I have examined the patient and there is no change as of October 24, 2080    RECENT LABS:  Hematology WBC   Date Value Ref Range Status   04/08/2019 3.29 (L) 3.40 - 10.80 10*3/mm3 Final     RBC   Date Value Ref Range Status   04/08/2019 4.28 3.77 - 5.28 10*6/mm3 Final     Hemoglobin   Date Value Ref Range Status   04/08/2019 12.2 12.0 - 15.9 g/dL Final     Hematocrit   Date Value Ref Range Status   04/08/2019 39.4 34.0 - 46.6 % Final     Platelets   Date Value Ref Range Status   04/08/2019 165 140 - 450 10*3/mm3 Final        Assessment/Plan     1.  History of renal cell cancer status post right nephrectomy in 2008.  She is been followed with observation with CT scans.     2.  Status post kidney transplant ×2, initial one was in 1995 and there was rejection in 2008 and subsequently patient underwent a second kidney transplant in 2011.     3.  History of new onset sclerosis seen at thoracic 8 and thoracic 7 vertebrae on a routine CT scan of the chest abdomen pelvis.  A bone scan was obtained which also shows evidence of abnormality on T8 vertebral body.  I'm unsure if this is malignancy or " arthritis.  Given the history of renal cell carcinoma likely best to obtain a CT-guided biopsy of the lesion at the 8 vertebral body.  I did not order an MRI of the spine with contrast given that she has had 2 kidney transplants.  Patient is on immunosuppressive therapy  · Patient was scheduled for CT-guided biopsy of the T8 vertebral body sclerotic metastases lesion but it was too small to biopsy and repeat CT suggested.  · Repeat CT scan and bone scan shows that the T7-T8 lesion is more degenerative in nature.     4.  Anemia    Plan 1.  Patient is totally asymptomatic.  2.  We will release the patient from our office and she will follow-up with her primary care physician and kidney transplant position.    I believe the sclerosis at T8 and T9 was more arthritis.  In future if she has any issues will be happy to see her.    We will release her from our office.    Yanni Saucedo MD                  Cc:  Alberto Myers MD

## 2019-05-29 ENCOUNTER — HOSPITAL ENCOUNTER (OUTPATIENT)
Facility: HOSPITAL | Age: 69
Discharge: HOME | End: 2019-06-02
Attending: EMERGENCY MEDICINE | Admitting: HOSPITALIST

## 2019-05-29 ENCOUNTER — APPOINTMENT (OUTPATIENT)
Dept: CT IMAGING | Facility: HOSPITAL | Age: 69
End: 2019-05-29

## 2019-05-29 DIAGNOSIS — R19.7 DIARRHEA, UNSPECIFIED TYPE: ICD-10-CM

## 2019-05-29 DIAGNOSIS — R91.1 PULMONARY NODULE: ICD-10-CM

## 2019-05-29 DIAGNOSIS — D64.9 ANEMIA, UNSPECIFIED TYPE: ICD-10-CM

## 2019-05-29 DIAGNOSIS — N17.9 ACUTE KIDNEY INJURY (HCC): Primary | ICD-10-CM

## 2019-05-29 PROBLEM — R50.9 FEVER: Status: ACTIVE | Noted: 2019-05-29

## 2019-05-29 LAB
ADV 40+41 DNA STL QL NAA+NON-PROBE: NOT DETECTED
ALBUMIN SERPL-MCNC: 3.6 G/DL (ref 3.5–5.2)
ALBUMIN/GLOB SERPL: 1 G/DL
ALP SERPL-CCNC: 58 U/L (ref 39–117)
ALT SERPL W P-5'-P-CCNC: 20 U/L (ref 1–33)
ANION GAP SERPL CALCULATED.3IONS-SCNC: 15.6 MMOL/L
AST SERPL-CCNC: 20 U/L (ref 1–32)
ASTRO TYP 1-8 RNA STL QL NAA+NON-PROBE: NOT DETECTED
BACTERIA UR QL AUTO: NORMAL /HPF
BASOPHILS # BLD AUTO: 0.02 10*3/MM3 (ref 0–0.2)
BASOPHILS NFR BLD AUTO: 0.4 % (ref 0–1.5)
BILIRUB SERPL-MCNC: 0.5 MG/DL (ref 0.2–1.2)
BILIRUB UR QL STRIP: NEGATIVE
BUN BLD-MCNC: 25 MG/DL (ref 8–23)
BUN/CREAT SERPL: 19.8 (ref 7–25)
C CAYETANENSIS DNA STL QL NAA+NON-PROBE: NOT DETECTED
C DIFF TOX GENS STL QL NAA+PROBE: NEGATIVE
CALCIUM SPEC-SCNC: 9.2 MG/DL (ref 8.6–10.5)
CAMPY SP DNA.DIARRHEA STL QL NAA+PROBE: NOT DETECTED
CHLORIDE SERPL-SCNC: 99 MMOL/L (ref 98–107)
CLARITY UR: ABNORMAL
CO2 SERPL-SCNC: 23.4 MMOL/L (ref 22–29)
COLOR UR: ABNORMAL
CREAT BLD-MCNC: 1.26 MG/DL (ref 0.57–1)
CRYPTOSP STL CULT: NOT DETECTED
D-LACTATE SERPL-SCNC: 1.2 MMOL/L (ref 0.5–2)
DEPRECATED RDW RBC AUTO: 42.5 FL (ref 37–54)
E COLI DNA SPEC QL NAA+PROBE: NOT DETECTED
E HISTOLYT AG STL-ACNC: NOT DETECTED
EAEC PAA PLAS AGGR+AATA ST NAA+NON-PRB: NOT DETECTED
EC STX1 + STX2 GENES STL NAA+PROBE: NOT DETECTED
EOSINOPHIL # BLD AUTO: 0.22 10*3/MM3 (ref 0–0.4)
EOSINOPHIL NFR BLD AUTO: 4.1 % (ref 0.3–6.2)
EPEC EAE GENE STL QL NAA+NON-PROBE: NOT DETECTED
ERYTHROCYTE [DISTWIDTH] IN BLOOD BY AUTOMATED COUNT: 12.9 % (ref 12.3–15.4)
ETEC LTA+ST1A+ST1B TOX ST NAA+NON-PROBE: NOT DETECTED
G LAMBLIA DNA SPEC QL NAA+PROBE: NOT DETECTED
GFR SERPL CREATININE-BSD FRML MDRD: 51 ML/MIN/1.73
GLOBULIN UR ELPH-MCNC: 3.7 GM/DL
GLUCOSE BLD-MCNC: 187 MG/DL (ref 65–99)
GLUCOSE BLDC GLUCOMTR-MCNC: 102 MG/DL (ref 70–130)
GLUCOSE UR STRIP-MCNC: NEGATIVE MG/DL
HCT VFR BLD AUTO: 37.1 % (ref 34–46.6)
HGB BLD-MCNC: 11.5 G/DL (ref 12–15.9)
HGB UR QL STRIP.AUTO: NEGATIVE
HYALINE CASTS UR QL AUTO: NORMAL /LPF
IMM GRANULOCYTES # BLD AUTO: 0.03 10*3/MM3 (ref 0–0.05)
IMM GRANULOCYTES NFR BLD AUTO: 0.6 % (ref 0–0.5)
KETONES UR QL STRIP: ABNORMAL
LEUKOCYTE ESTERASE UR QL STRIP.AUTO: ABNORMAL
LYMPHOCYTES # BLD AUTO: 0.51 10*3/MM3 (ref 0.7–3.1)
LYMPHOCYTES NFR BLD AUTO: 9.5 % (ref 19.6–45.3)
MCH RBC QN AUTO: 28.1 PG (ref 26.6–33)
MCHC RBC AUTO-ENTMCNC: 31 G/DL (ref 31.5–35.7)
MCV RBC AUTO: 90.7 FL (ref 79–97)
MONOCYTES # BLD AUTO: 0.19 10*3/MM3 (ref 0.1–0.9)
MONOCYTES NFR BLD AUTO: 3.6 % (ref 5–12)
NEUTROPHILS # BLD AUTO: 4.38 10*3/MM3 (ref 1.7–7)
NEUTROPHILS NFR BLD AUTO: 81.8 % (ref 42.7–76)
NITRITE UR QL STRIP: NEGATIVE
NOROVIRUS GI+II RNA STL QL NAA+NON-PROBE: NOT DETECTED
NRBC BLD AUTO-RTO: 0 /100 WBC (ref 0–0.2)
P SHIGELLOIDES DNA STL QL NAA+NON-PROBE: NOT DETECTED
PH UR STRIP.AUTO: <=5 [PH] (ref 5–8)
PLATELET # BLD AUTO: 251 10*3/MM3 (ref 140–450)
PMV BLD AUTO: 9.7 FL (ref 6–12)
POTASSIUM BLD-SCNC: 4.8 MMOL/L (ref 3.5–5.2)
PROCALCITONIN SERPL-MCNC: 0.09 NG/ML (ref 0.1–0.25)
PROT SERPL-MCNC: 7.3 G/DL (ref 6–8.5)
PROT UR QL STRIP: ABNORMAL
RBC # BLD AUTO: 4.09 10*6/MM3 (ref 3.77–5.28)
RBC # UR: NORMAL /HPF
REF LAB TEST METHOD: NORMAL
RV RNA STL NAA+PROBE: NOT DETECTED
SALMONELLA DNA SPEC QL NAA+PROBE: NOT DETECTED
SAPO I+II+IV+V RNA STL QL NAA+NON-PROBE: NOT DETECTED
SHIGELLA SP+EIEC IPAH STL QL NAA+PROBE: NOT DETECTED
SODIUM BLD-SCNC: 138 MMOL/L (ref 136–145)
SP GR UR STRIP: >=1.03 (ref 1–1.03)
SQUAMOUS #/AREA URNS HPF: NORMAL /HPF
UROBILINOGEN UR QL STRIP: ABNORMAL
V CHOLERAE DNA SPEC QL NAA+PROBE: NOT DETECTED
VIBRIO DNA SPEC NAA+PROBE: NOT DETECTED
WBC NRBC COR # BLD: 5.35 10*3/MM3 (ref 3.4–10.8)
WBC UR QL AUTO: NORMAL /HPF
YERSINIA STL CULT: NOT DETECTED

## 2019-05-29 PROCEDURE — 96361 HYDRATE IV INFUSION ADD-ON: CPT

## 2019-05-29 PROCEDURE — 87493 C DIFF AMPLIFIED PROBE: CPT | Performed by: EMERGENCY MEDICINE

## 2019-05-29 PROCEDURE — 99284 EMERGENCY DEPT VISIT MOD MDM: CPT

## 2019-05-29 PROCEDURE — 83605 ASSAY OF LACTIC ACID: CPT | Performed by: EMERGENCY MEDICINE

## 2019-05-29 PROCEDURE — G0378 HOSPITAL OBSERVATION PER HR: HCPCS

## 2019-05-29 PROCEDURE — 87486 CHLMYD PNEUM DNA AMP PROBE: CPT | Performed by: NURSE PRACTITIONER

## 2019-05-29 PROCEDURE — 81001 URINALYSIS AUTO W/SCOPE: CPT | Performed by: EMERGENCY MEDICINE

## 2019-05-29 PROCEDURE — 87633 RESP VIRUS 12-25 TARGETS: CPT | Performed by: NURSE PRACTITIONER

## 2019-05-29 PROCEDURE — 87507 IADNA-DNA/RNA PROBE TQ 12-25: CPT | Performed by: EMERGENCY MEDICINE

## 2019-05-29 PROCEDURE — 96360 HYDRATION IV INFUSION INIT: CPT

## 2019-05-29 PROCEDURE — 74176 CT ABD & PELVIS W/O CONTRAST: CPT

## 2019-05-29 PROCEDURE — 80053 COMPREHEN METABOLIC PANEL: CPT | Performed by: EMERGENCY MEDICINE

## 2019-05-29 PROCEDURE — 36415 COLL VENOUS BLD VENIPUNCTURE: CPT

## 2019-05-29 PROCEDURE — 87581 M.PNEUMON DNA AMP PROBE: CPT | Performed by: NURSE PRACTITIONER

## 2019-05-29 PROCEDURE — 84145 PROCALCITONIN (PCT): CPT | Performed by: EMERGENCY MEDICINE

## 2019-05-29 PROCEDURE — 87040 BLOOD CULTURE FOR BACTERIA: CPT | Performed by: EMERGENCY MEDICINE

## 2019-05-29 PROCEDURE — 87798 DETECT AGENT NOS DNA AMP: CPT | Performed by: NURSE PRACTITIONER

## 2019-05-29 PROCEDURE — 82962 GLUCOSE BLOOD TEST: CPT

## 2019-05-29 PROCEDURE — 85025 COMPLETE CBC W/AUTO DIFF WBC: CPT | Performed by: EMERGENCY MEDICINE

## 2019-05-29 RX ORDER — ASPIRIN 81 MG/1
81 TABLET ORAL DAILY
COMMUNITY

## 2019-05-29 RX ORDER — SODIUM CHLORIDE 9 MG/ML
125 INJECTION, SOLUTION INTRAVENOUS CONTINUOUS
Status: DISCONTINUED | OUTPATIENT
Start: 2019-05-29 | End: 2019-05-29

## 2019-05-29 RX ORDER — SODIUM CHLORIDE 0.9 % (FLUSH) 0.9 %
1-10 SYRINGE (ML) INJECTION AS NEEDED
Status: DISCONTINUED | OUTPATIENT
Start: 2019-05-29 | End: 2019-06-02 | Stop reason: HOSPADM

## 2019-05-29 RX ORDER — ASPIRIN 81 MG/1
81 TABLET ORAL DAILY
Status: DISCONTINUED | OUTPATIENT
Start: 2019-05-30 | End: 2019-06-02 | Stop reason: HOSPADM

## 2019-05-29 RX ORDER — SODIUM CHLORIDE 0.9 % (FLUSH) 0.9 %
10 SYRINGE (ML) INJECTION AS NEEDED
Status: DISCONTINUED | OUTPATIENT
Start: 2019-05-29 | End: 2019-06-02 | Stop reason: HOSPADM

## 2019-05-29 RX ORDER — MULTIPLE VITAMINS W/ MINERALS TAB 9MG-400MCG
1 TAB ORAL DAILY
Status: DISCONTINUED | OUTPATIENT
Start: 2019-05-30 | End: 2019-06-02 | Stop reason: HOSPADM

## 2019-05-29 RX ORDER — ACETAMINOPHEN 325 MG/1
650 TABLET ORAL EVERY 4 HOURS PRN
Status: DISCONTINUED | OUTPATIENT
Start: 2019-05-29 | End: 2019-06-02 | Stop reason: HOSPADM

## 2019-05-29 RX ORDER — LEVOTHYROXINE SODIUM 0.12 MG/1
125 TABLET ORAL DAILY
Status: DISCONTINUED | OUTPATIENT
Start: 2019-05-30 | End: 2019-06-02 | Stop reason: HOSPADM

## 2019-05-29 RX ORDER — ESCITALOPRAM OXALATE 10 MG/1
10 TABLET ORAL DAILY
Status: DISCONTINUED | OUTPATIENT
Start: 2019-05-30 | End: 2019-06-02 | Stop reason: HOSPADM

## 2019-05-29 RX ORDER — MULTIPLE VITAMINS W/ MINERALS TAB 9MG-400MCG
1 TAB ORAL DAILY
COMMUNITY

## 2019-05-29 RX ORDER — ROSUVASTATIN CALCIUM 40 MG/1
40 TABLET, COATED ORAL DAILY
Status: DISCONTINUED | OUTPATIENT
Start: 2019-05-30 | End: 2019-06-02 | Stop reason: HOSPADM

## 2019-05-29 RX ORDER — NICOTINE POLACRILEX 4 MG
15 LOZENGE BUCCAL
Status: DISCONTINUED | OUTPATIENT
Start: 2019-05-29 | End: 2019-06-02 | Stop reason: HOSPADM

## 2019-05-29 RX ORDER — SODIUM CHLORIDE 0.9 % (FLUSH) 0.9 %
3 SYRINGE (ML) INJECTION EVERY 12 HOURS SCHEDULED
Status: DISCONTINUED | OUTPATIENT
Start: 2019-05-29 | End: 2019-06-02 | Stop reason: HOSPADM

## 2019-05-29 RX ORDER — ESCITALOPRAM OXALATE 10 MG/1
10 TABLET ORAL DAILY
COMMUNITY
End: 2019-06-11 | Stop reason: DRUGHIGH

## 2019-05-29 RX ORDER — TACROLIMUS 1 MG/1
1 CAPSULE ORAL EVERY 12 HOURS
Status: DISCONTINUED | OUTPATIENT
Start: 2019-05-29 | End: 2019-06-02 | Stop reason: HOSPADM

## 2019-05-29 RX ORDER — DEXTROSE MONOHYDRATE 25 G/50ML
25 INJECTION, SOLUTION INTRAVENOUS
Status: DISCONTINUED | OUTPATIENT
Start: 2019-05-29 | End: 2019-06-02 | Stop reason: HOSPADM

## 2019-05-29 RX ORDER — PREDNISONE 10 MG/1
10 TABLET ORAL DAILY
Status: DISCONTINUED | OUTPATIENT
Start: 2019-05-30 | End: 2019-06-02 | Stop reason: HOSPADM

## 2019-05-29 RX ORDER — SODIUM CHLORIDE 9 MG/ML
75 INJECTION, SOLUTION INTRAVENOUS CONTINUOUS
Status: ACTIVE | OUTPATIENT
Start: 2019-05-29 | End: 2019-05-31

## 2019-05-29 RX ORDER — PREDNISONE 10 MG/1
2.5 TABLET ORAL DAILY
COMMUNITY

## 2019-05-29 RX ORDER — ROSUVASTATIN CALCIUM 40 MG/1
40 TABLET, COATED ORAL DAILY
COMMUNITY

## 2019-05-29 RX ORDER — CARVEDILOL 25 MG/1
25 TABLET ORAL 2 TIMES DAILY WITH MEALS
Status: DISCONTINUED | OUTPATIENT
Start: 2019-05-29 | End: 2019-06-02 | Stop reason: HOSPADM

## 2019-05-29 RX ORDER — ONDANSETRON 4 MG/1
4 TABLET, FILM COATED ORAL EVERY 6 HOURS PRN
Status: DISCONTINUED | OUTPATIENT
Start: 2019-05-29 | End: 2019-06-02 | Stop reason: HOSPADM

## 2019-05-29 RX ORDER — PANTOPRAZOLE SODIUM 40 MG/1
40 TABLET, DELAYED RELEASE ORAL EVERY MORNING
Status: DISCONTINUED | OUTPATIENT
Start: 2019-05-30 | End: 2019-06-02 | Stop reason: HOSPADM

## 2019-05-29 RX ORDER — ONDANSETRON 2 MG/ML
4 INJECTION INTRAMUSCULAR; INTRAVENOUS EVERY 6 HOURS PRN
Status: DISCONTINUED | OUTPATIENT
Start: 2019-05-29 | End: 2019-06-02 | Stop reason: HOSPADM

## 2019-05-29 RX ORDER — HYDRALAZINE HYDROCHLORIDE 20 MG/ML
10 INJECTION INTRAMUSCULAR; INTRAVENOUS EVERY 6 HOURS PRN
Status: DISCONTINUED | OUTPATIENT
Start: 2019-05-29 | End: 2019-06-02 | Stop reason: HOSPADM

## 2019-05-29 RX ORDER — CARVEDILOL 25 MG/1
25 TABLET ORAL 2 TIMES DAILY WITH MEALS
COMMUNITY

## 2019-05-29 RX ADMIN — SODIUM CHLORIDE 125 ML/HR: 9 INJECTION, SOLUTION INTRAVENOUS at 20:01

## 2019-05-29 RX ADMIN — SODIUM CHLORIDE 100 ML/HR: 9 INJECTION, SOLUTION INTRAVENOUS at 23:40

## 2019-05-29 RX ADMIN — SODIUM CHLORIDE, POTASSIUM CHLORIDE, SODIUM LACTATE AND CALCIUM CHLORIDE 500 ML: 600; 310; 30; 20 INJECTION, SOLUTION INTRAVENOUS at 19:16

## 2019-05-30 ENCOUNTER — APPOINTMENT (OUTPATIENT)
Dept: CT IMAGING | Facility: HOSPITAL | Age: 69
End: 2019-05-30

## 2019-05-30 ENCOUNTER — APPOINTMENT (OUTPATIENT)
Dept: CARDIOLOGY | Facility: HOSPITAL | Age: 69
End: 2019-05-30

## 2019-05-30 ENCOUNTER — APPOINTMENT (OUTPATIENT)
Dept: GENERAL RADIOLOGY | Facility: HOSPITAL | Age: 69
End: 2019-05-30

## 2019-05-30 PROBLEM — R19.7 DIARRHEA: Status: ACTIVE | Noted: 2019-05-30

## 2019-05-30 LAB
ANION GAP SERPL CALCULATED.3IONS-SCNC: 12.7 MMOL/L
B PARAPERT DNA SPEC QL NAA+PROBE: NOT DETECTED
B PERT DNA SPEC QL NAA+PROBE: NOT DETECTED
BH CV LOWER VASCULAR LEFT COMMON FEMORAL AUGMENT: NORMAL
BH CV LOWER VASCULAR LEFT COMMON FEMORAL COMPETENT: NORMAL
BH CV LOWER VASCULAR LEFT COMMON FEMORAL COMPRESS: NORMAL
BH CV LOWER VASCULAR LEFT COMMON FEMORAL PHASIC: NORMAL
BH CV LOWER VASCULAR LEFT COMMON FEMORAL SPONT: NORMAL
BH CV LOWER VASCULAR LEFT DISTAL FEMORAL COMPRESS: NORMAL
BH CV LOWER VASCULAR LEFT GASTRONEMIUS COMPRESS: NORMAL
BH CV LOWER VASCULAR LEFT GREATER SAPH AK COMPRESS: NORMAL
BH CV LOWER VASCULAR LEFT GREATER SAPH BK COMPRESS: NORMAL
BH CV LOWER VASCULAR LEFT LESSER SAPH COMPRESS: NORMAL
BH CV LOWER VASCULAR LEFT MID FEMORAL AUGMENT: NORMAL
BH CV LOWER VASCULAR LEFT MID FEMORAL COMPETENT: NORMAL
BH CV LOWER VASCULAR LEFT MID FEMORAL COMPRESS: NORMAL
BH CV LOWER VASCULAR LEFT MID FEMORAL PHASIC: NORMAL
BH CV LOWER VASCULAR LEFT MID FEMORAL SPONT: NORMAL
BH CV LOWER VASCULAR LEFT PERONEAL COMPRESS: NORMAL
BH CV LOWER VASCULAR LEFT POPLITEAL AUGMENT: NORMAL
BH CV LOWER VASCULAR LEFT POPLITEAL COMPETENT: NORMAL
BH CV LOWER VASCULAR LEFT POPLITEAL COMPRESS: NORMAL
BH CV LOWER VASCULAR LEFT POPLITEAL PHASIC: NORMAL
BH CV LOWER VASCULAR LEFT POPLITEAL SPONT: NORMAL
BH CV LOWER VASCULAR LEFT POSTERIOR TIBIAL COMPRESS: NORMAL
BH CV LOWER VASCULAR LEFT PROXIMAL FEMORAL COMPRESS: NORMAL
BH CV LOWER VASCULAR LEFT SAPHENOFEMORAL JUNCTION AUGMENT: NORMAL
BH CV LOWER VASCULAR LEFT SAPHENOFEMORAL JUNCTION COMPETENT: NORMAL
BH CV LOWER VASCULAR LEFT SAPHENOFEMORAL JUNCTION COMPRESS: NORMAL
BH CV LOWER VASCULAR LEFT SAPHENOFEMORAL JUNCTION PHASIC: NORMAL
BH CV LOWER VASCULAR LEFT SAPHENOFEMORAL JUNCTION SPONT: NORMAL
BH CV LOWER VASCULAR RIGHT COMMON FEMORAL AUGMENT: NORMAL
BH CV LOWER VASCULAR RIGHT COMMON FEMORAL COMPETENT: NORMAL
BH CV LOWER VASCULAR RIGHT COMMON FEMORAL COMPRESS: NORMAL
BH CV LOWER VASCULAR RIGHT COMMON FEMORAL PHASIC: NORMAL
BH CV LOWER VASCULAR RIGHT COMMON FEMORAL SPONT: NORMAL
BH CV LOWER VASCULAR RIGHT DISTAL FEMORAL COMPRESS: NORMAL
BH CV LOWER VASCULAR RIGHT GASTRONEMIUS COMPRESS: NORMAL
BH CV LOWER VASCULAR RIGHT GREATER SAPH AK COMPRESS: NORMAL
BH CV LOWER VASCULAR RIGHT GREATER SAPH BK COMPRESS: NORMAL
BH CV LOWER VASCULAR RIGHT LESSER SAPH COMPRESS: NORMAL
BH CV LOWER VASCULAR RIGHT MID FEMORAL AUGMENT: NORMAL
BH CV LOWER VASCULAR RIGHT MID FEMORAL COMPETENT: NORMAL
BH CV LOWER VASCULAR RIGHT MID FEMORAL COMPRESS: NORMAL
BH CV LOWER VASCULAR RIGHT MID FEMORAL PHASIC: NORMAL
BH CV LOWER VASCULAR RIGHT MID FEMORAL SPONT: NORMAL
BH CV LOWER VASCULAR RIGHT PERONEAL COMPRESS: NORMAL
BH CV LOWER VASCULAR RIGHT POPLITEAL AUGMENT: NORMAL
BH CV LOWER VASCULAR RIGHT POPLITEAL COMPETENT: NORMAL
BH CV LOWER VASCULAR RIGHT POPLITEAL COMPRESS: NORMAL
BH CV LOWER VASCULAR RIGHT POPLITEAL PHASIC: NORMAL
BH CV LOWER VASCULAR RIGHT POPLITEAL SPONT: NORMAL
BH CV LOWER VASCULAR RIGHT POSTERIOR TIBIAL COMPRESS: NORMAL
BH CV LOWER VASCULAR RIGHT PROXIMAL FEMORAL COMPRESS: NORMAL
BH CV LOWER VASCULAR RIGHT SAPHENOFEMORAL JUNCTION AUGMENT: NORMAL
BH CV LOWER VASCULAR RIGHT SAPHENOFEMORAL JUNCTION COMPETENT: NORMAL
BH CV LOWER VASCULAR RIGHT SAPHENOFEMORAL JUNCTION COMPRESS: NORMAL
BH CV LOWER VASCULAR RIGHT SAPHENOFEMORAL JUNCTION PHASIC: NORMAL
BH CV LOWER VASCULAR RIGHT SAPHENOFEMORAL JUNCTION SPONT: NORMAL
BUN BLD-MCNC: 20 MG/DL (ref 8–23)
BUN/CREAT SERPL: 23.8 (ref 7–25)
C PNEUM DNA NPH QL NAA+NON-PROBE: NOT DETECTED
CALCIUM SPEC-SCNC: 8.7 MG/DL (ref 8.6–10.5)
CHLORIDE SERPL-SCNC: 101 MMOL/L (ref 98–107)
CO2 SERPL-SCNC: 22.3 MMOL/L (ref 22–29)
CREAT BLD-MCNC: 0.84 MG/DL (ref 0.57–1)
DEPRECATED RDW RBC AUTO: 42.9 FL (ref 37–54)
ERYTHROCYTE [DISTWIDTH] IN BLOOD BY AUTOMATED COUNT: 12.8 % (ref 12.3–15.4)
FLUAV H1 2009 PAND RNA NPH QL NAA+PROBE: NOT DETECTED
FLUAV H1 HA GENE NPH QL NAA+PROBE: NOT DETECTED
FLUAV H3 RNA NPH QL NAA+PROBE: NOT DETECTED
FLUAV SUBTYP SPEC NAA+PROBE: NOT DETECTED
FLUBV RNA ISLT QL NAA+PROBE: NOT DETECTED
GFR SERPL CREATININE-BSD FRML MDRD: 82 ML/MIN/1.73
GLUCOSE BLD-MCNC: 90 MG/DL (ref 65–99)
GLUCOSE BLDC GLUCOMTR-MCNC: 112 MG/DL (ref 70–130)
GLUCOSE BLDC GLUCOMTR-MCNC: 139 MG/DL (ref 70–130)
GLUCOSE BLDC GLUCOMTR-MCNC: 175 MG/DL (ref 70–130)
GLUCOSE BLDC GLUCOMTR-MCNC: 85 MG/DL (ref 70–130)
HADV DNA SPEC NAA+PROBE: NOT DETECTED
HBA1C MFR BLD: 5.9 % (ref 4.8–5.6)
HCOV 229E RNA SPEC QL NAA+PROBE: NOT DETECTED
HCOV HKU1 RNA SPEC QL NAA+PROBE: NOT DETECTED
HCOV NL63 RNA SPEC QL NAA+PROBE: NOT DETECTED
HCOV OC43 RNA SPEC QL NAA+PROBE: NOT DETECTED
HCT VFR BLD AUTO: 33.5 % (ref 34–46.6)
HGB BLD-MCNC: 10.3 G/DL (ref 12–15.9)
HMPV RNA NPH QL NAA+NON-PROBE: NOT DETECTED
HPIV1 RNA SPEC QL NAA+PROBE: NOT DETECTED
HPIV2 RNA SPEC QL NAA+PROBE: NOT DETECTED
HPIV3 RNA NPH QL NAA+PROBE: NOT DETECTED
HPIV4 P GENE NPH QL NAA+PROBE: NOT DETECTED
M PNEUMO IGG SER IA-ACNC: NOT DETECTED
MCH RBC QN AUTO: 28.3 PG (ref 26.6–33)
MCHC RBC AUTO-ENTMCNC: 30.7 G/DL (ref 31.5–35.7)
MCV RBC AUTO: 92 FL (ref 79–97)
PLATELET # BLD AUTO: 211 10*3/MM3 (ref 140–450)
PMV BLD AUTO: 9.1 FL (ref 6–12)
POTASSIUM BLD-SCNC: 4.2 MMOL/L (ref 3.5–5.2)
RBC # BLD AUTO: 3.64 10*6/MM3 (ref 3.77–5.28)
RHINOVIRUS RNA SPEC NAA+PROBE: NOT DETECTED
RSV RNA NPH QL NAA+NON-PROBE: NOT DETECTED
SODIUM BLD-SCNC: 136 MMOL/L (ref 136–145)
WBC NRBC COR # BLD: 4.96 10*3/MM3 (ref 3.4–10.8)

## 2019-05-30 PROCEDURE — 71045 X-RAY EXAM CHEST 1 VIEW: CPT

## 2019-05-30 PROCEDURE — 36415 COLL VENOUS BLD VENIPUNCTURE: CPT | Performed by: NURSE PRACTITIONER

## 2019-05-30 PROCEDURE — 99204 OFFICE O/P NEW MOD 45 MIN: CPT | Performed by: INTERNAL MEDICINE

## 2019-05-30 PROCEDURE — 96361 HYDRATE IV INFUSION ADD-ON: CPT

## 2019-05-30 PROCEDURE — 63710000001 TACROLIMUS PER 1 MG: Performed by: NURSE PRACTITIONER

## 2019-05-30 PROCEDURE — 87209 SMEAR COMPLEX STAIN: CPT | Performed by: INTERNAL MEDICINE

## 2019-05-30 PROCEDURE — 85027 COMPLETE CBC AUTOMATED: CPT | Performed by: NURSE PRACTITIONER

## 2019-05-30 PROCEDURE — G0378 HOSPITAL OBSERVATION PER HR: HCPCS

## 2019-05-30 PROCEDURE — 87177 OVA AND PARASITES SMEARS: CPT | Performed by: INTERNAL MEDICINE

## 2019-05-30 PROCEDURE — 93970 EXTREMITY STUDY: CPT

## 2019-05-30 PROCEDURE — 71250 CT THORAX DX C-: CPT

## 2019-05-30 PROCEDURE — 99205 OFFICE O/P NEW HI 60 MIN: CPT | Performed by: INTERNAL MEDICINE

## 2019-05-30 PROCEDURE — 83036 HEMOGLOBIN GLYCOSYLATED A1C: CPT | Performed by: NURSE PRACTITIONER

## 2019-05-30 PROCEDURE — 82962 GLUCOSE BLOOD TEST: CPT

## 2019-05-30 PROCEDURE — 80048 BASIC METABOLIC PNL TOTAL CA: CPT | Performed by: NURSE PRACTITIONER

## 2019-05-30 PROCEDURE — 63710000001 INSULIN LISPRO (HUMAN) PER 5 UNITS: Performed by: NURSE PRACTITIONER

## 2019-05-30 PROCEDURE — 63710000001 PREDNISONE PER 5 MG: Performed by: NURSE PRACTITIONER

## 2019-05-30 RX ADMIN — PREDNISONE 10 MG: 10 TABLET ORAL at 08:09

## 2019-05-30 RX ADMIN — TACROLIMUS 1 MG: 1 CAPSULE ORAL at 12:47

## 2019-05-30 RX ADMIN — MULTIPLE VITAMINS W/ MINERALS TAB 1 TABLET: TAB at 08:09

## 2019-05-30 RX ADMIN — SODIUM CHLORIDE 100 ML/HR: 9 INJECTION, SOLUTION INTRAVENOUS at 05:39

## 2019-05-30 RX ADMIN — TACROLIMUS 1 MG: 1 CAPSULE ORAL at 00:51

## 2019-05-30 RX ADMIN — ROSUVASTATIN CALCIUM 40 MG: 40 TABLET, FILM COATED ORAL at 08:09

## 2019-05-30 RX ADMIN — PANTOPRAZOLE SODIUM 40 MG: 40 TABLET, DELAYED RELEASE ORAL at 06:12

## 2019-05-30 RX ADMIN — CARVEDILOL 25 MG: 25 TABLET, FILM COATED ORAL at 08:09

## 2019-05-30 RX ADMIN — ACETAMINOPHEN 650 MG: 325 TABLET, FILM COATED ORAL at 00:54

## 2019-05-30 RX ADMIN — CARVEDILOL 25 MG: 25 TABLET, FILM COATED ORAL at 00:51

## 2019-05-30 RX ADMIN — TACROLIMUS 1 MG: 1 CAPSULE ORAL at 21:26

## 2019-05-30 RX ADMIN — CARVEDILOL 25 MG: 25 TABLET, FILM COATED ORAL at 18:58

## 2019-05-30 RX ADMIN — LEVOTHYROXINE SODIUM 125 MCG: 125 TABLET ORAL at 20:28

## 2019-05-30 RX ADMIN — INSULIN LISPRO 2 UNITS: 100 INJECTION, SOLUTION INTRAVENOUS; SUBCUTANEOUS at 12:47

## 2019-05-30 RX ADMIN — ASPIRIN 81 MG: 81 TABLET, DELAYED RELEASE ORAL at 08:09

## 2019-05-30 RX ADMIN — ACETAMINOPHEN 650 MG: 325 TABLET, FILM COATED ORAL at 08:08

## 2019-05-30 RX ADMIN — ESCITALOPRAM 10 MG: 10 TABLET, FILM COATED ORAL at 08:09

## 2019-05-31 ENCOUNTER — ANESTHESIA (OUTPATIENT)
Dept: GASTROENTEROLOGY | Facility: HOSPITAL | Age: 69
End: 2019-05-31

## 2019-05-31 ENCOUNTER — ANESTHESIA EVENT (OUTPATIENT)
Dept: GASTROENTEROLOGY | Facility: HOSPITAL | Age: 69
End: 2019-05-31

## 2019-05-31 PROBLEM — R91.1 PULMONARY NODULE: Status: ACTIVE | Noted: 2019-05-31

## 2019-05-31 LAB
ALBUMIN SERPL-MCNC: 3.4 G/DL (ref 3.5–5.2)
ALBUMIN/GLOB SERPL: 1.1 G/DL
ALP SERPL-CCNC: 53 U/L (ref 39–117)
ALT SERPL W P-5'-P-CCNC: 14 U/L (ref 1–33)
ANION GAP SERPL CALCULATED.3IONS-SCNC: 9.7 MMOL/L
AST SERPL-CCNC: 15 U/L (ref 1–32)
BASOPHILS # BLD AUTO: 0.03 10*3/MM3 (ref 0–0.2)
BASOPHILS NFR BLD AUTO: 0.6 % (ref 0–1.5)
BILIRUB SERPL-MCNC: 0.4 MG/DL (ref 0.2–1.2)
BUN BLD-MCNC: 13 MG/DL (ref 8–23)
BUN/CREAT SERPL: 16.7 (ref 7–25)
CALCIUM SPEC-SCNC: 8.8 MG/DL (ref 8.6–10.5)
CHLORIDE SERPL-SCNC: 100 MMOL/L (ref 98–107)
CO2 SERPL-SCNC: 24.3 MMOL/L (ref 22–29)
CREAT BLD-MCNC: 0.78 MG/DL (ref 0.57–1)
DEPRECATED RDW RBC AUTO: 42 FL (ref 37–54)
EOSINOPHIL # BLD AUTO: 0.43 10*3/MM3 (ref 0–0.4)
EOSINOPHIL NFR BLD AUTO: 8.1 % (ref 0.3–6.2)
ERYTHROCYTE [DISTWIDTH] IN BLOOD BY AUTOMATED COUNT: 12.8 % (ref 12.3–15.4)
GFR SERPL CREATININE-BSD FRML MDRD: 89 ML/MIN/1.73
GLOBULIN UR ELPH-MCNC: 3.1 GM/DL
GLUCOSE BLD-MCNC: 97 MG/DL (ref 65–99)
GLUCOSE BLDC GLUCOMTR-MCNC: 119 MG/DL (ref 70–130)
GLUCOSE BLDC GLUCOMTR-MCNC: 121 MG/DL (ref 70–130)
GLUCOSE BLDC GLUCOMTR-MCNC: 91 MG/DL (ref 70–130)
GLUCOSE BLDC GLUCOMTR-MCNC: 95 MG/DL (ref 70–130)
HCT VFR BLD AUTO: 34.3 % (ref 34–46.6)
HGB BLD-MCNC: 10.5 G/DL (ref 12–15.9)
IMM GRANULOCYTES # BLD AUTO: 0.07 10*3/MM3 (ref 0–0.05)
IMM GRANULOCYTES NFR BLD AUTO: 1.3 % (ref 0–0.5)
INR PPP: 1.07 (ref 0.9–1.1)
LYMPHOCYTES # BLD AUTO: 1.27 10*3/MM3 (ref 0.7–3.1)
LYMPHOCYTES NFR BLD AUTO: 24.1 % (ref 19.6–45.3)
MCH RBC QN AUTO: 27.8 PG (ref 26.6–33)
MCHC RBC AUTO-ENTMCNC: 30.6 G/DL (ref 31.5–35.7)
MCV RBC AUTO: 90.7 FL (ref 79–97)
MONOCYTES # BLD AUTO: 0.42 10*3/MM3 (ref 0.1–0.9)
MONOCYTES NFR BLD AUTO: 8 % (ref 5–12)
NEUTROPHILS # BLD AUTO: 3.06 10*3/MM3 (ref 1.7–7)
NEUTROPHILS NFR BLD AUTO: 57.9 % (ref 42.7–76)
NRBC BLD AUTO-RTO: 0.2 /100 WBC (ref 0–0.2)
PLATELET # BLD AUTO: 220 10*3/MM3 (ref 140–450)
PMV BLD AUTO: 9 FL (ref 6–12)
POTASSIUM BLD-SCNC: 4.2 MMOL/L (ref 3.5–5.2)
PROT SERPL-MCNC: 6.5 G/DL (ref 6–8.5)
PROTHROMBIN TIME: 13.6 SECONDS (ref 11.7–14.2)
RBC # BLD AUTO: 3.78 10*6/MM3 (ref 3.77–5.28)
SODIUM BLD-SCNC: 134 MMOL/L (ref 136–145)
TACROLIMUS BLD-MCNC: NORMAL NG/ML
WBC NRBC COR # BLD: 5.28 10*3/MM3 (ref 3.4–10.8)

## 2019-05-31 PROCEDURE — G0378 HOSPITAL OBSERVATION PER HR: HCPCS

## 2019-05-31 PROCEDURE — 63710000001 TACROLIMUS PER 1 MG: Performed by: NURSE PRACTITIONER

## 2019-05-31 PROCEDURE — 88305 TISSUE EXAM BY PATHOLOGIST: CPT | Performed by: INTERNAL MEDICINE

## 2019-05-31 PROCEDURE — 85610 PROTHROMBIN TIME: CPT | Performed by: INTERNAL MEDICINE

## 2019-05-31 PROCEDURE — 80053 COMPREHEN METABOLIC PANEL: CPT | Performed by: INTERNAL MEDICINE

## 2019-05-31 PROCEDURE — 45380 COLONOSCOPY AND BIOPSY: CPT | Performed by: INTERNAL MEDICINE

## 2019-05-31 PROCEDURE — 96361 HYDRATE IV INFUSION ADD-ON: CPT

## 2019-05-31 PROCEDURE — 63710000001 PREDNISONE PER 5 MG: Performed by: NURSE PRACTITIONER

## 2019-05-31 PROCEDURE — 85025 COMPLETE CBC W/AUTO DIFF WBC: CPT | Performed by: INTERNAL MEDICINE

## 2019-05-31 PROCEDURE — 82962 GLUCOSE BLOOD TEST: CPT

## 2019-05-31 PROCEDURE — 63710000001 PREDNISONE PER 1 MG: Performed by: HOSPITALIST

## 2019-05-31 PROCEDURE — 99214 OFFICE O/P EST MOD 30 MIN: CPT | Performed by: INTERNAL MEDICINE

## 2019-05-31 PROCEDURE — 25010000002 PROPOFOL 10 MG/ML EMULSION: Performed by: NURSE ANESTHETIST, CERTIFIED REGISTERED

## 2019-05-31 PROCEDURE — 80197 ASSAY OF TACROLIMUS: CPT | Performed by: INTERNAL MEDICINE

## 2019-05-31 RX ORDER — SODIUM CHLORIDE 0.9 % (FLUSH) 0.9 %
3 SYRINGE (ML) INJECTION AS NEEDED
Status: DISCONTINUED | OUTPATIENT
Start: 2019-05-31 | End: 2019-05-31 | Stop reason: HOSPADM

## 2019-05-31 RX ORDER — LIDOCAINE HYDROCHLORIDE 20 MG/ML
INJECTION, SOLUTION INFILTRATION; PERINEURAL AS NEEDED
Status: DISCONTINUED | OUTPATIENT
Start: 2019-05-31 | End: 2019-05-31 | Stop reason: SURG

## 2019-05-31 RX ORDER — PREDNISONE 20 MG/1
20 TABLET ORAL
Status: DISCONTINUED | OUTPATIENT
Start: 2019-05-31 | End: 2019-06-02

## 2019-05-31 RX ORDER — PROPOFOL 10 MG/ML
VIAL (ML) INTRAVENOUS AS NEEDED
Status: DISCONTINUED | OUTPATIENT
Start: 2019-05-31 | End: 2019-05-31 | Stop reason: SURG

## 2019-05-31 RX ORDER — LIDOCAINE HYDROCHLORIDE 10 MG/ML
0.5 INJECTION, SOLUTION INFILTRATION; PERINEURAL ONCE AS NEEDED
Status: DISCONTINUED | OUTPATIENT
Start: 2019-05-31 | End: 2019-05-31 | Stop reason: HOSPADM

## 2019-05-31 RX ORDER — PROPOFOL 10 MG/ML
VIAL (ML) INTRAVENOUS CONTINUOUS PRN
Status: DISCONTINUED | OUTPATIENT
Start: 2019-05-31 | End: 2019-05-31 | Stop reason: SURG

## 2019-05-31 RX ORDER — SODIUM CHLORIDE 9 MG/ML
1000 INJECTION, SOLUTION INTRAVENOUS CONTINUOUS
Status: DISCONTINUED | OUTPATIENT
Start: 2019-05-31 | End: 2019-06-01

## 2019-05-31 RX ORDER — PREDNISONE 20 MG/1
20 TABLET ORAL
Status: DISCONTINUED | OUTPATIENT
Start: 2019-05-31 | End: 2019-05-31

## 2019-05-31 RX ADMIN — LIDOCAINE HYDROCHLORIDE 60 MG: 20 INJECTION, SOLUTION INFILTRATION; PERINEURAL at 14:34

## 2019-05-31 RX ADMIN — ACETAMINOPHEN 650 MG: 325 TABLET, FILM COATED ORAL at 15:32

## 2019-05-31 RX ADMIN — TACROLIMUS 1 MG: 1 CAPSULE ORAL at 15:35

## 2019-05-31 RX ADMIN — CARVEDILOL 25 MG: 25 TABLET, FILM COATED ORAL at 17:54

## 2019-05-31 RX ADMIN — PREDNISONE 10 MG: 10 TABLET ORAL at 15:34

## 2019-05-31 RX ADMIN — PROPOFOL 70 MG: 10 INJECTION, EMULSION INTRAVENOUS at 14:34

## 2019-05-31 RX ADMIN — TACROLIMUS 1 MG: 1 CAPSULE ORAL at 22:54

## 2019-05-31 RX ADMIN — CARVEDILOL 25 MG: 25 TABLET, FILM COATED ORAL at 08:40

## 2019-05-31 RX ADMIN — MULTIPLE VITAMINS W/ MINERALS TAB 1 TABLET: TAB at 15:34

## 2019-05-31 RX ADMIN — ROSUVASTATIN CALCIUM 40 MG: 40 TABLET, FILM COATED ORAL at 15:35

## 2019-05-31 RX ADMIN — SODIUM CHLORIDE 1000 ML: 9 INJECTION, SOLUTION INTRAVENOUS at 15:27

## 2019-05-31 RX ADMIN — ACETAMINOPHEN 650 MG: 325 TABLET, FILM COATED ORAL at 06:31

## 2019-05-31 RX ADMIN — LEVOTHYROXINE SODIUM 125 MCG: 125 TABLET ORAL at 15:36

## 2019-05-31 RX ADMIN — PANTOPRAZOLE SODIUM 40 MG: 40 TABLET, DELAYED RELEASE ORAL at 06:31

## 2019-05-31 RX ADMIN — SODIUM CHLORIDE 75 ML/HR: 9 INJECTION, SOLUTION INTRAVENOUS at 13:48

## 2019-05-31 RX ADMIN — PROPOFOL 300 MCG/KG/MIN: 10 INJECTION, EMULSION INTRAVENOUS at 14:34

## 2019-05-31 RX ADMIN — ASPIRIN 81 MG: 81 TABLET, DELAYED RELEASE ORAL at 15:33

## 2019-05-31 RX ADMIN — ESCITALOPRAM 10 MG: 10 TABLET, FILM COATED ORAL at 15:34

## 2019-05-31 RX ADMIN — PREDNISONE 20 MG: 20 TABLET ORAL at 17:54

## 2019-05-31 NOTE — ANESTHESIA POSTPROCEDURE EVALUATION
"Patient: Diana Gee    Procedure Summary     Date:  05/31/19 Room / Location:  [facility] ENDOSCOPY 4 /  [facility]    Anesthesia Start:  1429 Anesthesia Stop:  1449    Procedure:  COLONOSCOPY WITHOUT PREP WITH BIOPSIES (N/A ) Diagnosis:       Anemia, unspecified type      (Anemia, unspecified type [D64.9])    Surgeon:  Cam Shin MD Provider:  Halle Kearney MD    Anesthesia Type:  MAC ASA Status:  3          Anesthesia Type: MAC  Last vitals  BP   131/76 (05/31/19 1447)   Temp   37.9 °C (100.3 °F) (05/31/19 1348)   Pulse   75 (05/31/19 1447)   Resp   16 (05/31/19 1447)     SpO2   94 % (05/31/19 1447)     Post Anesthesia Care and Evaluation    Patient location during evaluation: PACU  Patient participation: complete - patient participated  Level of consciousness: awake  Pain score: 0  Pain management: adequate  Airway patency: patent  Anesthetic complications: No anesthetic complications    Cardiovascular status: acceptable  Respiratory status: acceptable  Hydration status: acceptable    Comments: Blood pressure 131/76, pulse 75, temperature 37.9 °C (100.3 °F), resp. rate 16, height 162.6 cm (64\"), weight 102 kg (224 lb 13.9 oz), SpO2 94 %.    No anesthesia care post op    "

## 2019-05-31 NOTE — ANESTHESIA POSTPROCEDURE EVALUATION
"Patient: Diana Gee    Procedure Summary     Date:  05/31/19 Room / Location:  [facility] ENDOSCOPY 4 /  [facility]    Anesthesia Start:  1429 Anesthesia Stop:  1449    Procedure:  COLONOSCOPY WITHOUT PREP WITH BIOPSIES (N/A ) Diagnosis:       Anemia, unspecified type      (Anemia, unspecified type [D64.9])    Surgeon:  Cam Shin MD Provider:  Halle Kearney MD    Anesthesia Type:  MAC ASA Status:  3          Anesthesia Type: MAC  Last vitals  BP   131/76 (05/31/19 1447)   Temp   37.9 °C (100.3 °F) (05/31/19 1348)   Pulse   75 (05/31/19 1447)   Resp   16 (05/31/19 1447)     SpO2   94 % (05/31/19 1447)     Post Anesthesia Care and Evaluation    Patient location during evaluation: PACU  Patient participation: complete - patient participated  Level of consciousness: awake  Pain score: 0  Pain management: adequate  Airway patency: patent  Anesthetic complications: No anesthetic complications    Cardiovascular status: acceptable  Respiratory status: acceptable  Hydration status: acceptable    Comments: Blood pressure 131/76, pulse 75, temperature 37.9 °C (100.3 °F), resp. rate 16, height 162.6 cm (64\"), weight 102 kg (224 lb 13.9 oz), SpO2 94 %.    No anesthesia care post op    "

## 2019-05-31 NOTE — ANESTHESIA PREPROCEDURE EVALUATION
Anesthesia Evaluation     Patient summary reviewed and Nursing notes reviewed   NPO Solid Status: > 8 hours  NPO Liquid Status: > 6 hours           Airway   Mallampati: II  TM distance: >3 FB  Neck ROM: full  Possible difficult intubation  Dental - normal exam     Pulmonary - normal exam   Cardiovascular - normal exam    Patient on routine beta blocker    (+) hypertension 2 medications or greater, valvular problems/murmurs MR and MS,       Neuro/Psych  GI/Hepatic/Renal/Endo    (+) obesity,   hepatitis C, renal disease (s/p transplant; admitted w acute renal injury), diabetes mellitus type 2 using insulin, hypothyroidism,     Musculoskeletal     Abdominal    Substance History      OB/GYN          Other      history of cancer (renal) remission                    Anesthesia Plan    ASA 3     MAC     Anesthetic plan, all risks, benefits, and alternatives have been provided, discussed and informed consent has been obtained with: patient.

## 2019-06-01 PROBLEM — R50.9 FEVER: Status: RESOLVED | Noted: 2019-05-29 | Resolved: 2019-06-01

## 2019-06-01 PROBLEM — N17.9 AKI (ACUTE KIDNEY INJURY): Status: RESOLVED | Noted: 2019-05-29 | Resolved: 2019-06-01

## 2019-06-01 PROBLEM — N17.9 ACUTE KIDNEY INJURY (HCC): Status: RESOLVED | Noted: 2019-05-29 | Resolved: 2019-06-01

## 2019-06-01 LAB
ANION GAP SERPL CALCULATED.3IONS-SCNC: 13.3 MMOL/L
BUN BLD-MCNC: 10 MG/DL (ref 8–23)
BUN/CREAT SERPL: 14.9 (ref 7–25)
CALCIUM SPEC-SCNC: 9.3 MG/DL (ref 8.6–10.5)
CHLORIDE SERPL-SCNC: 100 MMOL/L (ref 98–107)
CO2 SERPL-SCNC: 21.7 MMOL/L (ref 22–29)
CREAT BLD-MCNC: 0.67 MG/DL (ref 0.57–1)
CYTO UR: NORMAL
GFR SERPL CREATININE-BSD FRML MDRD: 106 ML/MIN/1.73
GLUCOSE BLD-MCNC: 174 MG/DL (ref 65–99)
GLUCOSE BLDC GLUCOMTR-MCNC: 135 MG/DL (ref 70–130)
GLUCOSE BLDC GLUCOMTR-MCNC: 166 MG/DL (ref 70–130)
GLUCOSE BLDC GLUCOMTR-MCNC: 184 MG/DL (ref 70–130)
GLUCOSE BLDC GLUCOMTR-MCNC: 197 MG/DL (ref 70–130)
LAB AP CASE REPORT: NORMAL
PATH REPORT.FINAL DX SPEC: NORMAL
PATH REPORT.GROSS SPEC: NORMAL
POTASSIUM BLD-SCNC: 4.4 MMOL/L (ref 3.5–5.2)
SODIUM BLD-SCNC: 135 MMOL/L (ref 136–145)

## 2019-06-01 PROCEDURE — 63710000001 INSULIN LISPRO (HUMAN) PER 5 UNITS: Performed by: NURSE PRACTITIONER

## 2019-06-01 PROCEDURE — 82962 GLUCOSE BLOOD TEST: CPT

## 2019-06-01 PROCEDURE — 63710000001 TACROLIMUS PER 1 MG: Performed by: NURSE PRACTITIONER

## 2019-06-01 PROCEDURE — 80048 BASIC METABOLIC PNL TOTAL CA: CPT | Performed by: INTERNAL MEDICINE

## 2019-06-01 PROCEDURE — 63710000001 PREDNISONE PER 5 MG: Performed by: HOSPITALIST

## 2019-06-01 PROCEDURE — G0378 HOSPITAL OBSERVATION PER HR: HCPCS

## 2019-06-01 PROCEDURE — 99225 PR SBSQ OBSERVATION CARE/DAY 25 MINUTES: CPT | Performed by: INTERNAL MEDICINE

## 2019-06-01 PROCEDURE — 63710000001 PREDNISONE PER 5 MG: Performed by: NURSE PRACTITIONER

## 2019-06-01 PROCEDURE — 99213 OFFICE O/P EST LOW 20 MIN: CPT | Performed by: INTERNAL MEDICINE

## 2019-06-01 RX ADMIN — PANTOPRAZOLE SODIUM 40 MG: 40 TABLET, DELAYED RELEASE ORAL at 06:32

## 2019-06-01 RX ADMIN — PREDNISONE 10 MG: 10 TABLET ORAL at 08:41

## 2019-06-01 RX ADMIN — LEVOTHYROXINE SODIUM 125 MCG: 125 TABLET ORAL at 08:39

## 2019-06-01 RX ADMIN — ASPIRIN 81 MG: 81 TABLET, DELAYED RELEASE ORAL at 08:40

## 2019-06-01 RX ADMIN — TACROLIMUS 1 MG: 1 CAPSULE ORAL at 10:25

## 2019-06-01 RX ADMIN — CARVEDILOL 25 MG: 25 TABLET, FILM COATED ORAL at 08:40

## 2019-06-01 RX ADMIN — INSULIN LISPRO 2 UNITS: 100 INJECTION, SOLUTION INTRAVENOUS; SUBCUTANEOUS at 11:44

## 2019-06-01 RX ADMIN — MULTIPLE VITAMINS W/ MINERALS TAB 1 TABLET: TAB at 08:40

## 2019-06-01 RX ADMIN — TACROLIMUS 1 MG: 1 CAPSULE ORAL at 21:58

## 2019-06-01 RX ADMIN — INSULIN LISPRO 2 UNITS: 100 INJECTION, SOLUTION INTRAVENOUS; SUBCUTANEOUS at 17:30

## 2019-06-01 RX ADMIN — ROSUVASTATIN CALCIUM 40 MG: 40 TABLET, FILM COATED ORAL at 08:40

## 2019-06-01 RX ADMIN — CARVEDILOL 25 MG: 25 TABLET, FILM COATED ORAL at 17:30

## 2019-06-01 RX ADMIN — SODIUM CHLORIDE, PRESERVATIVE FREE 3 ML: 5 INJECTION INTRAVENOUS at 20:39

## 2019-06-01 RX ADMIN — ESCITALOPRAM 10 MG: 10 TABLET, FILM COATED ORAL at 08:40

## 2019-06-01 RX ADMIN — PREDNISONE 20 MG: 20 TABLET ORAL at 08:40

## 2019-06-01 RX ADMIN — INSULIN LISPRO 2 UNITS: 100 INJECTION, SOLUTION INTRAVENOUS; SUBCUTANEOUS at 21:58

## 2019-06-01 NOTE — PLAN OF CARE
Problem: Patient Care Overview  Goal: Plan of Care Review  Outcome: Ongoing (interventions implemented as appropriate)   06/01/19 0457   Coping/Psychosocial   Plan of Care Reviewed With patient   Plan of Care Review   Progress improving   OTHER   Outcome Summary Denies pain or nausea. States she is still having a littlew loose stools. Monitoring blood sugars. Had elevated temp at beginning of shift up to 102 but afebrile after that.      Goal: Individualization and Mutuality  Outcome: Ongoing (interventions implemented as appropriate)    Goal: Discharge Needs Assessment  Outcome: Ongoing (interventions implemented as appropriate)    Goal: Interprofessional Rounds/Family Conf  Outcome: Ongoing (interventions implemented as appropriate)      Problem: Fluid Volume Deficit (Adult)  Goal: Optimal Fluid Balance  Outcome: Ongoing (interventions implemented as appropriate)      Problem: Diarrhea (Adult)  Goal: Improved/Reduced Symptoms  Outcome: Ongoing (interventions implemented as appropriate)

## 2019-06-01 NOTE — PROGRESS NOTES
"    DAILY PROGRESS NOTE  [facility]    Patient Identification:  Name: Diana Gee  Age: 69 y.o.  Sex: female  :  1950  MRN: [id number]         Primary Care Physician: Ghada Rangel MD    Subjective:  Interval History: Today she admits to feeling better.  Denies any nausea vomiting abdominal pain dysuria and claims her diarrhea is improving.  She has not had a fever since about 530 last evening.  Denies any chills today.  No issues with altered mentation chest pain or shortness of breath    Objective: Spouse at bedside.  Also discussed with RN    Scheduled Meds:    aspirin 81 mg Oral Daily   carvedilol 25 mg Oral BID With Meals   escitalopram 10 mg Oral Daily   insulin lispro 0-7 Units Subcutaneous 4x Daily With Meals & Nightly   levothyroxine 125 mcg Oral Daily   multivitamin with minerals 1 tablet Oral Daily   pantoprazole 40 mg Oral QAM   predniSONE 10 mg Oral Daily   predniSONE 20 mg Oral Daily With Breakfast   rosuvastatin 40 mg Oral Daily   sodium chloride 3 mL Intravenous Q12H   tacrolimus 1 mg Oral Q12H     Continuous Infusions:     Vital signs in last 24 hours:  Temp:  [97.5 °F (36.4 °C)-102.2 °F (39 °C)] 97.5 °F (36.4 °C)  Heart Rate:  [62-78] 62  Resp:  [16] 16  BP: (100-173)/(56-80) 124/73    Intake/Output:    Intake/Output Summary (Last 24 hours) at 2019 1617  Last data filed at 2019 1300  Gross per 24 hour   Intake 1311 ml   Output 2400 ml   Net -1089 ml       Exam:  /73 (BP Location: Left arm, Patient Position: Sitting)   Pulse 62   Temp 97.5 °F (36.4 °C) (Oral)   Resp 16   Ht 162.6 cm (64\")   Wt 102 kg (224 lb 13.9 oz)   SpO2 95%   BMI 38.60 kg/m²     General Appearance:    Alert, cooperative, no distress, AAOx3, nontoxic appearing, interactive and conversational and animated                          Head:    Normocephalic, without obvious abnormality, atraumatic                         Throat:   Oral mucosa pink and moist                           " Neck:   No JVD                         Lungs:    Clear to auscultation bilaterally, respirations unlabored                          Heart:    Regular rate and rhythm, S1 and S2 normal                  Abdomen:     Soft, non-tender, bowel sounds active                 Extremities: Moving all appropriately, no cyanosis or edema                          Data Review:  Labs in chart were reviewed.    Assessment:  Active Hospital Problems    Diagnosis  POA   • **Diarrhea [R19.7]  Unknown   • Pulmonary nodule [R91.1]  Unknown   • Anemia [D64.9]  Unknown   • Type 2 diabetes mellitus (CMS/HCC) [E11.9]  Yes   • Benign hypertension [I10]  Yes   • Hyperlipidemia [E78.5]  Yes   • Renal transplant recipient [Z94.0]  Not Applicable   • Hepatitis C, treated [B19.20]  Yes      Resolved Hospital Problems    Diagnosis Date Resolved POA   • DANNY (acute kidney injury) (CMS/HCC) [N17.9] 06/01/2019 Yes   • Acute kidney injury (CMS/HCC) [N17.9] 06/01/2019 Yes   • Fever [R50.9] 06/01/2019 Yes       Plan:    Watch overnight to monitor fever curve - plans for DC in a.m. pending input from all consultants   -ID/GI following for diarrhea - ID perspective is that this is secondary to immunocompromise state and/or drug side effect.  GI awaiting pathology but that is negative so we will see what else they have to offer for a possible etiologies   -Additional stool studies still pending    Pulmonary nodule -CT in 3 months endorsed    DM 2 with an A1c of 5.9 and controlled blood sugars    ARF resolved    Fran Montesinos MD  6/1/2019  4:17 PM

## 2019-06-01 NOTE — PLAN OF CARE
Problem: Patient Care Overview  Goal: Plan of Care Review  Outcome: Ongoing (interventions implemented as appropriate)   06/01/19 1254   Coping/Psychosocial   Plan of Care Reviewed With patient   Plan of Care Review   Progress improving   OTHER   Outcome Summary No complaints. VSS. Sliding scale insulin given.        Problem: Fluid Volume Deficit (Adult)  Goal: Optimal Fluid Balance  Outcome: Ongoing (interventions implemented as appropriate)      Problem: Diarrhea (Adult)  Goal: Improved/Reduced Symptoms  Outcome: Ongoing (interventions implemented as appropriate)

## 2019-06-01 NOTE — PROGRESS NOTES
"   LOS: 0 days    Patient Care Team:  Ghada Rangel MD as PCP - General (Family Medicine)  Alberto Myers MD as Referring Physician (Urology)  Yanni Saucedo MD as Consulting Physician (Hematology and Oncology)  Dinora Hernandez MD as Consulting Physician (Endocrinology)    Chief Complaint:  DANNY/CKD, Kidney transplant, fever, diarrhea    Subjective   Chart reviewed.  Tm 102.2 but no fevers so far today.  States diarrhea has resolved.    Objective     Vital Sign Min/Max for last 24 hours  Temp  Min: 97.5 °F (36.4 °C)  Max: 102.2 °F (39 °C)   BP  Min: 100/64  Max: 173/76   Pulse  Min: 62  Max: 78   Resp  Min: 15  Max: 16   SpO2  Min: 91 %  Max: 96 %   No Data Recorded   No Data Recorded     Flowsheet Rows      First Filed Value   Admission Height  162.6 cm (64\") Documented at 05/29/2019 1641   Admission Weight  102 kg (224 lb) Documented at 05/29/2019 1641          I/O this shift:  In: 430 [P.O.:430]  Out: 700 [Urine:700]  I/O last 3 completed shifts:  In: 1081 [P.O.:420; I.V.:661]  Out: 2800 [Urine:2800]    Physical Exam:  GEN: Awake, NAD  ENT: PERRL, EOMI, MMM  NECK: Supple, no JVD  CHEST: CTAB, no W/R/C  CV: RRR, no M/G/R  ABD: Soft, NT, +BS  SKIN: Warm and Dry  NEURO: CN's intact      WBC WBC   Date Value Ref Range Status   05/31/2019 5.28 3.40 - 10.80 10*3/mm3 Final   05/30/2019 4.96 3.40 - 10.80 10*3/mm3 Final   05/29/2019 5.35 3.40 - 10.80 10*3/mm3 Final      HGB Hemoglobin   Date Value Ref Range Status   05/31/2019 10.5 (L) 12.0 - 15.9 g/dL Final   05/30/2019 10.3 (L) 12.0 - 15.9 g/dL Final   05/29/2019 11.5 (L) 12.0 - 15.9 g/dL Final      HCT Hematocrit   Date Value Ref Range Status   05/31/2019 34.3 34.0 - 46.6 % Final   05/30/2019 33.5 (L) 34.0 - 46.6 % Final   05/29/2019 37.1 34.0 - 46.6 % Final      Platlets No results found for: LABPLAT   MCV MCV   Date Value Ref Range Status   05/31/2019 90.7 79.0 - 97.0 fL Final   05/30/2019 92.0 79.0 - 97.0 fL Final   05/29/2019 90.7 79.0 - 97.0 fL " Final          Sodium Sodium   Date Value Ref Range Status   06/01/2019 135 (L) 136 - 145 mmol/L Final   05/31/2019 134 (L) 136 - 145 mmol/L Final   05/30/2019 136 136 - 145 mmol/L Final   05/29/2019 138 136 - 145 mmol/L Final      Potassium Potassium   Date Value Ref Range Status   06/01/2019 4.4 3.5 - 5.2 mmol/L Final   05/31/2019 4.2 3.5 - 5.2 mmol/L Final   05/30/2019 4.2 3.5 - 5.2 mmol/L Final   05/29/2019 4.8 3.5 - 5.2 mmol/L Final      Chloride Chloride   Date Value Ref Range Status   06/01/2019 100 98 - 107 mmol/L Final   05/31/2019 100 98 - 107 mmol/L Final   05/30/2019 101 98 - 107 mmol/L Final   05/29/2019 99 98 - 107 mmol/L Final      CO2 CO2   Date Value Ref Range Status   06/01/2019 21.7 (L) 22.0 - 29.0 mmol/L Final   05/31/2019 24.3 22.0 - 29.0 mmol/L Final   05/30/2019 22.3 22.0 - 29.0 mmol/L Final   05/29/2019 23.4 22.0 - 29.0 mmol/L Final      BUN BUN   Date Value Ref Range Status   06/01/2019 10 8 - 23 mg/dL Final   05/31/2019 13 8 - 23 mg/dL Final   05/30/2019 20 8 - 23 mg/dL Final   05/29/2019 25 (H) 8 - 23 mg/dL Final      Creatinine Creatinine   Date Value Ref Range Status   06/01/2019 0.67 0.57 - 1.00 mg/dL Final   05/31/2019 0.78 0.57 - 1.00 mg/dL Final   05/30/2019 0.84 0.57 - 1.00 mg/dL Final   05/29/2019 1.26 (H) 0.57 - 1.00 mg/dL Final      Calcium Calcium   Date Value Ref Range Status   06/01/2019 9.3 8.6 - 10.5 mg/dL Final   05/31/2019 8.8 8.6 - 10.5 mg/dL Final   05/30/2019 8.7 8.6 - 10.5 mg/dL Final   05/29/2019 9.2 8.6 - 10.5 mg/dL Final      PO4 No results found for: CAPO4   Albumin Albumin   Date Value Ref Range Status   05/31/2019 3.40 (L) 3.50 - 5.20 g/dL Final   05/29/2019 3.60 3.50 - 5.20 g/dL Final      Magnesium No results found for: MG   Uric Acid No results found for: URICACID        Results Review:     I reviewed the patient's new clinical results.      aspirin 81 mg Oral Daily   carvedilol 25 mg Oral BID With Meals   escitalopram 10 mg Oral Daily   insulin lispro 0-7  Units Subcutaneous 4x Daily With Meals & Nightly   levothyroxine 125 mcg Oral Daily   multivitamin with minerals 1 tablet Oral Daily   pantoprazole 40 mg Oral QAM   predniSONE 10 mg Oral Daily   predniSONE 20 mg Oral Daily With Breakfast   rosuvastatin 40 mg Oral Daily   sodium chloride 3 mL Intravenous Q12H   tacrolimus 1 mg Oral Q12H       sodium chloride 1,000 mL Last Rate: 1,000 mL (05/31/19 8611)       Medication Review: Reviewed    Assessment/Plan       DANNY (acute kidney injury) (CMS/HCC)    Type 2 diabetes mellitus (CMS/HCC)    Benign hypertension    Hyperlipidemia    Renal transplant recipient    Hepatitis C, treated    Anemia    Acute kidney injury (CMS/HCC)    Fever    Diarrhea    Pulmonary nodule    PLAN: Cr improved to 0.6.  Lytes and volume OK.  Diarrhea resolved and so far no fever today.  ID and GI following.      Ayden Meza MD   [facility]  06/01/19  2:32 PM

## 2019-06-01 NOTE — PROGRESS NOTES
ID note  CC: diarrhea  S: diarrhea resolved.  She is still having fevers but think these are improving as well    O: AF presently but Tm102, NAD, abd soft and not ttp      A/p  1.  Diarrhea in in immunosuppressed individual  --[facility] Records obtained and EBV, CMV PCR, blood cultures, stool culture and Giardia/cryptosporidium antigen all negative  --Endoscopy nl with neg path  --hold abx for now  --I think she is slowly getting better probably had virus that slow to respond due to IS or drug side effect  --d/w her that if her fevers persist would need to do additional testing; she will contact me as outpatient next week if fevers continue    2.  Diabetes mellitus type 2, continue glycemic control efforts to prevent/control infectious complications  3.  Pulmonary lesion, recommend repeat CT chest in 3 months  --? Source of fever     Looks wonderful.  OK to d/c from my end. She will call me if issues arise.

## 2019-06-01 NOTE — PROGRESS NOTES
[facility]  Inpatient Progress Note    Reason for Follow Up:  Nonbloody diarrhea in an immunocompromised patient post kidney transplant.    Subjective     Interval History:   No diarrhea today. She tolerated yesterday's colonoscopy.     Current Facility-Administered Medications:   •  acetaminophen (TYLENOL) tablet 650 mg, 650 mg, Oral, Q4H PRN, Fina Coronel APRN, 650 mg at 05/31/19 1532  •  aspirin EC tablet 81 mg, 81 mg, Oral, Daily, Fina Coronel APRN, 81 mg at 06/01/19 0840  •  carvedilol (COREG) tablet 25 mg, 25 mg, Oral, BID With Meals, Fina Coronel APRN, 25 mg at 06/01/19 0840  •  dextrose (D50W) 25 g/ 50mL Intravenous Solution 25 g, 25 g, Intravenous, Q15 Min PRN, Fina Coronel APRN  •  dextrose (GLUTOSE) oral gel 15 g, 15 g, Oral, Q15 Min PRN, Fina Coronel APRN  •  escitalopram (LEXAPRO) tablet 10 mg, 10 mg, Oral, Daily, Fina Coronel, APRN, 10 mg at 06/01/19 0840  •  glucagon (human recombinant) (GLUCAGEN DIAGNOSTIC) injection 1 mg, 1 mg, Subcutaneous, PRN, Fina Coronel APRN  •  hydrALAZINE (APRESOLINE) injection 10 mg, 10 mg, Intravenous, Q6H PRN, Fina Coronel APRN  •  insulin lispro (humaLOG) injection 0-7 Units, 0-7 Units, Subcutaneous, 4x Daily With Meals & Nightly, Fina Coronel APRN, 2 Units at 05/30/19 1247  •  levothyroxine (SYNTHROID, LEVOTHROID) tablet 125 mcg, 125 mcg, Oral, Daily, Fina Coronel APRN, 125 mcg at 06/01/19 0839  •  multivitamin with minerals 1 tablet, 1 tablet, Oral, Daily, Fina Coronel APRN, 1 tablet at 06/01/19 0840  •  ondansetron (ZOFRAN) tablet 4 mg, 4 mg, Oral, Q6H PRN **OR** ondansetron (ZOFRAN) injection 4 mg, 4 mg, Intravenous, Q6H PRN, Fina Coronel APRN  •  pantoprazole (PROTONIX) EC tablet 40 mg, 40 mg, Oral, QAM, Fina Coronel APRN, 40 mg at 06/01/19 0632  •  predniSONE (DELTASONE) tablet 10 mg, 10 mg, Oral, Daily, Fina Coronel APRN, 10 mg at 06/01/19 0841  •  predniSONE (DELTASONE)  tablet 20 mg, 20 mg, Oral, Daily With Breakfast, Iglesia Dobson MD, 20 mg at 06/01/19 0840  •  rosuvastatin (CRESTOR) tablet 40 mg, 40 mg, Oral, Daily, Fina Coronel, APRN, 40 mg at 06/01/19 0840  •  sodium chloride 0.9 % flush 1-10 mL, 1-10 mL, Intravenous, PRN, Fina Coronel APRN  •  [COMPLETED] Insert peripheral IV, , , Once **AND** sodium chloride 0.9 % flush 10 mL, 10 mL, Intravenous, PRN, Valentin Khoury MD  •  sodium chloride 0.9 % flush 3 mL, 3 mL, Intravenous, Q12H, Fina Coronel APRN  •  sodium chloride 0.9 % infusion 1,000 mL, 1,000 mL, Intravenous, Continuous, Cam Shin MD, Last Rate: 25 mL/hr at 05/31/19 1731, 1,000 mL at 05/31/19 1731  •  tacrolimus (PROGRAF) capsule 1 mg, 1 mg, Oral, Q12H, Fina Coronel, APRN, 1 mg at 05/31/19 2254  Review of Systems:    The following systems were reviewed and negative;  respiratory, cardiovascular, musculoskeletal and neurological    Objective     Vital Signs  Temp:  [97.7 °F (36.5 °C)-102.2 °F (39 °C)] 99.3 °F (37.4 °C)  Heart Rate:  [65-78] 69  Resp:  [15-16] 16  BP: (105-173)/(56-88) 173/76  Body mass index is 38.6 kg/m².    Intake/Output Summary (Last 24 hours) at 6/1/2019 1010  Last data filed at 6/1/2019 0845  Gross per 24 hour   Intake 1291 ml   Output 1700 ml   Net -409 ml     I/O this shift:  In: 210 [P.O.:210]  Out: -      Physical Exam:   General: patient awake, alert and cooperative   Eyes: Normal lids and lashes, no scleral icterus   Neck: supple, normal ROM   Skin: warm and dry, not jaundiced   Cardiovascular: regular rhythm and rate, no murmurs auscultated   Pulm: clear to auscultation bilaterally, regular and unlabored   Abdomen: soft, nontender, nondistended; normal bowel sounds   Rectal: deferred   Extremities: no rash or edema   Psychiatric: Normal mood and behavior; memory intact     Results Review:     I reviewed the patient's new clinical results.    Results from last 7 days   Lab Units 05/31/19  0546 05/30/19  0550  05/29/19  1655   WBC 10*3/mm3 5.28 4.96 5.35   HEMOGLOBIN g/dL 10.5* 10.3* 11.5*   HEMATOCRIT % 34.3 33.5* 37.1   PLATELETS 10*3/mm3 220 211 251     Results from last 7 days   Lab Units 06/01/19  0347 05/31/19  0546 05/30/19  0547 05/29/19  1655   SODIUM mmol/L 135* 134* 136 138   POTASSIUM mmol/L 4.4 4.2 4.2 4.8   CHLORIDE mmol/L 100 100 101 99   CO2 mmol/L 21.7* 24.3 22.3 23.4   BUN mg/dL 10 13 20 25*   CREATININE mg/dL 0.67 0.78 0.84 1.26*   CALCIUM mg/dL 9.3 8.8 8.7 9.2   BILIRUBIN mg/dL  --  0.4  --  0.5   ALK PHOS U/L  --  53  --  58   ALT (SGPT) U/L  --  14  --  20   AST (SGOT) U/L  --  15  --  20   GLUCOSE mg/dL 174* 97 90 187*     Results from last 7 days   Lab Units 05/31/19  0546   INR  1.07     No results found for: LIPASE    Radiology:  CT Chest Without Contrast   Final Result   There has been interval increase in the 6 mm pleural-based   nodule at the right upper lobe and there are several new tiny nodular   opacities bilaterally. There is also a new small ill-defined right upper   lobe groundglass opacity. The shotty mediastinal nodes are slightly   larger as well. These findings are nonspecific, but the increase in size   of the 6 mm right pulmonary nodule is concerning. The new tiny opacities   may represent infectious or inflammatory infiltrates. These opacities   are too small for PET characterization. Reevaluation is recommended with   a chest CT in 3 months.       This report was finalized on 5/31/2019 6:47 AM by Dr. Zulma Hillman M.D.          XR Chest 1 View   Final Result      CT Abdomen Pelvis Without Contrast   Final Result   There is evidence of prior transplant in the left lower quadrant. No   evidence for an active or acute intra-abdominal or pelvic process is   appreciated. The appearance of the abdominal and pelvic structures is   not significantly changed when compared to the prior examination of   10/15/2018.       Radiation dose reduction techniques were utilized, including automated    exposure control and exposure modulation based on body size.       This report was finalized on 5/29/2019 8:56 PM by Dr. Christos Stewart M.D.          CT Chest Without Contrast    (Results Pending)       Assessment/Plan     Patient Active Problem List   Diagnosis   • Type 2 diabetes mellitus (CMS/HCC)   • Benign hypertension   • Hyperlipidemia   • Renal transplant recipient   • Hepatitis C, treated   • Carcinoma of right kidney, s/p nephrectomy   • Neuropathy   • Immunocompromised (CMS/HCC)   • Neutropenia (CMS/HCC)   • Anemia   • Charcot's arthropathy   • Mixed anxiety depressive disorder   • Dyspnea on exertion   • Insomnia   • Mitral valve insufficiency   • Peripheral edema   • Postablative hypothyroidism   • DANNY (acute kidney injury) (CMS/HCC)   • Acute kidney injury (CMS/HCC)   • Fever   • Diarrhea   • Pulmonary nodule       I discussed the patients findings and my recommendations with patient.    Assessment/Recommendations:  1) Diarrhea in a patient on immunosuppressant/immunocompromised. Await path from her 5/31/19 colonoscopy biopsies. ID is following.     Aaron Mayes MD

## 2019-06-02 VITALS
HEIGHT: 64 IN | HEART RATE: 66 BPM | WEIGHT: 224.87 LBS | SYSTOLIC BLOOD PRESSURE: 146 MMHG | RESPIRATION RATE: 16 BRPM | BODY MASS INDEX: 38.39 KG/M2 | TEMPERATURE: 98.1 F | DIASTOLIC BLOOD PRESSURE: 76 MMHG | OXYGEN SATURATION: 95 %

## 2019-06-02 LAB
ANION GAP SERPL CALCULATED.3IONS-SCNC: 12.4 MMOL/L
BUN BLD-MCNC: 16 MG/DL (ref 8–23)
BUN/CREAT SERPL: 22.2 (ref 7–25)
CALCIUM SPEC-SCNC: 9.1 MG/DL (ref 8.6–10.5)
CHLORIDE SERPL-SCNC: 106 MMOL/L (ref 98–107)
CMV DNA SERPL NAA+PROBE-ACNC: NEGATIVE IU/ML
CO2 SERPL-SCNC: 23.6 MMOL/L (ref 22–29)
CREAT BLD-MCNC: 0.72 MG/DL (ref 0.57–1)
GFR SERPL CREATININE-BSD FRML MDRD: 98 ML/MIN/1.73
GLUCOSE BLD-MCNC: 125 MG/DL (ref 65–99)
GLUCOSE BLDC GLUCOMTR-MCNC: 110 MG/DL (ref 70–130)
GLUCOSE BLDC GLUCOMTR-MCNC: 169 MG/DL (ref 70–130)
LOG 10 CMV QN DNA PI: NORMAL LOG10 IU/ML
POTASSIUM BLD-SCNC: 4.9 MMOL/L (ref 3.5–5.2)
SODIUM BLD-SCNC: 142 MMOL/L (ref 136–145)

## 2019-06-02 PROCEDURE — 63710000001 TACROLIMUS PER 1 MG: Performed by: NURSE PRACTITIONER

## 2019-06-02 PROCEDURE — 99213 OFFICE O/P EST LOW 20 MIN: CPT | Performed by: INTERNAL MEDICINE

## 2019-06-02 PROCEDURE — 82962 GLUCOSE BLOOD TEST: CPT

## 2019-06-02 PROCEDURE — 99225 PR SBSQ OBSERVATION CARE/DAY 25 MINUTES: CPT | Performed by: INTERNAL MEDICINE

## 2019-06-02 PROCEDURE — 80048 BASIC METABOLIC PNL TOTAL CA: CPT | Performed by: INTERNAL MEDICINE

## 2019-06-02 PROCEDURE — G0378 HOSPITAL OBSERVATION PER HR: HCPCS

## 2019-06-02 PROCEDURE — 63710000001 PREDNISONE PER 5 MG: Performed by: NURSE PRACTITIONER

## 2019-06-02 RX ADMIN — ESCITALOPRAM 10 MG: 10 TABLET, FILM COATED ORAL at 09:20

## 2019-06-02 RX ADMIN — ASPIRIN 81 MG: 81 TABLET, DELAYED RELEASE ORAL at 09:19

## 2019-06-02 RX ADMIN — TACROLIMUS 1 MG: 1 CAPSULE ORAL at 11:42

## 2019-06-02 RX ADMIN — PREDNISONE 10 MG: 10 TABLET ORAL at 09:19

## 2019-06-02 RX ADMIN — LEVOTHYROXINE SODIUM 125 MCG: 125 TABLET ORAL at 09:19

## 2019-06-02 RX ADMIN — MULTIPLE VITAMINS W/ MINERALS TAB 1 TABLET: TAB at 09:20

## 2019-06-02 RX ADMIN — CARVEDILOL 25 MG: 25 TABLET, FILM COATED ORAL at 09:19

## 2019-06-02 RX ADMIN — ROSUVASTATIN CALCIUM 40 MG: 40 TABLET, FILM COATED ORAL at 09:19

## 2019-06-02 RX ADMIN — PANTOPRAZOLE SODIUM 40 MG: 40 TABLET, DELAYED RELEASE ORAL at 06:54

## 2019-06-02 NOTE — PROGRESS NOTES
ID note  CC: diarrhea  S: diarrhea resolved.  No f/c/ns     O: AF presently and Tm 99.3, NAD, abd soft and not ttp     glc 110-197  Cr 0.7     A/p  1.  Diarrhea in in immunosuppressed individual  --[facility] Records obtained and EBV, CMV PCR, blood cultures, stool culture and Giardia/cryptosporidium antigen all negative  --Endoscopy nl with neg path  --hold abx for now  --I think she is slowly getting better probably had virus that slow to respond due to IS or drug side effect  --d/w her that if her fevers persist would need to do additional testing;     2.  Diabetes mellitus type 2, continue glycemic control efforts to prevent/control infectious complications  3.  Pulmonary lesion, recommend repeat CT chest in 3 months  --Possible, but less likely source of fever       No objection to discharge when okay with others.  She has my contact information will let me know if she runs into any difficulties as an outpatient.  We will see PRN.

## 2019-06-02 NOTE — DISCHARGE SUMMARY
[facility]    Date of Discharge:  6/2/2019    PCP: Ghada Rangel MD    Discharge Diagnosis:   Active Hospital Problems    Diagnosis  POA   • **Diarrhea [R19.7]  Unknown   • Pulmonary nodule [R91.1]  Unknown   • Anemia [D64.9]  Unknown   • Type 2 diabetes mellitus (CMS/HCC) [E11.9]  Yes   • Benign hypertension [I10]  Yes   • Hyperlipidemia [E78.5]  Yes   • Renal transplant recipient [Z94.0]  Not Applicable   • Hepatitis C, treated [B19.20]  Yes      Resolved Hospital Problems    Diagnosis Date Resolved POA   • DANNY (acute kidney injury) (CMS/HCC) [N17.9] 06/01/2019 Yes   • Acute kidney injury (CMS/HCC) [N17.9] 06/01/2019 Yes   • Fever [R50.9] 06/01/2019 Yes     Procedures Performed  Procedure(s):  COLONOSCOPY WITHOUT PREP WITH BIOPSIES  05/31 1430 COLONOSCOPY  Consults     Date and Time Order Name Status Description    5/30/2019 1448 Inpatient Nephrology Consult Completed     5/30/2019 1257 Inpatient Gastroenterology Consult Completed     5/30/2019 0624 Inpatient Infectious Diseases Consult Completed     5/29/2019 1957 [facility] (on-call MD unless specified) Completed         Hospital Course  Please see history and physical for details. Patient is a 69 y.o. female who was wonderfully pleasant and was initially admitted for nausea vomiting diarrhea and fatigue in addition to fever.  She is normally followed by the [facility] kidney transplant team.  Ultimately patient was seen due to her immunosuppression, infectious disease.  We held antibiotics and the patient has shown clinical improvement.  Her steroid was temporarily increased and now she is back down to her 10 mg dose.  She also underwent lower endoscopy on 5/31/2019.  Pathology from the colonoscopy was unremarkable.  Remaining stool studies involving GI panel and C. difficile were unremarkable.  She still has over and parasites pending.  Blood cultures of also remain negative x2.  Patient has had  resolution of her diarrhea by discharge.  She also reports no further issues with nausea vomiting and I kept her an additional 24 hours to ensure that the fever did not return.  Her vitals remained stable as well as afebrile overnight.  Clinically she looks great this morning and her abdomen is completely benign.  Nephrology also followed given her past history of the kidney transplant.  She did have some acute renal insufficiency and we did hold her ARB but by discharge her creatinine is trended back down to 0.72 with a GFR of 98 potassium of 4.9 and a bicarb of 23.6.  At disposition our resume her previous medication regimen.  No family present on day of discharge that I met the spouse the day prior.  All questions answered to the patient prior to disposition and she is very much amenable to the above plan.  She was formally given 10 mg of prednisone per her transplant doctor to take for 7 days.  She has a couple days worth remaining as we kept her on steroids while here and I advised her to go ahead and take the steroid until tomorrow and then discontinue.  Patient amenable to the above plan and all questions answered to the best my ability.      Condition on Discharge: Improved.     Temp:  [97.1 °F (36.2 °C)-98.1 °F (36.7 °C)] 98.1 °F (36.7 °C)  Heart Rate:  [61-68] 66  Resp:  [16] 16  BP: (100-146)/(64-76) 146/76  Body mass index is 38.6 kg/m².    Physical Exam   Constitutional: She is oriented to person, place, and time. She appears well-developed and well-nourished.   HENT:   Head: Normocephalic.   Neck: No JVD present.   Cardiovascular: Normal rate and regular rhythm.   Pulmonary/Chest: Effort normal and breath sounds normal. No respiratory distress.   Abdominal: Soft. Bowel sounds are normal. She exhibits no distension. There is no tenderness. There is no guarding.   Musculoskeletal: She exhibits no edema.   Neurological: She is alert and oriented to person, place, and time.   Psychiatric: She has a normal  mood and affect. Her behavior is normal.        Discharge Medications      Continue These Medications      Instructions Start Date   aspirin 81 MG EC tablet   81 mg, Oral, Daily      carvedilol 25 MG tablet  Commonly known as:  COREG   25 mg, Oral, 2 Times Daily With Meals      CRESTOR 40 MG tablet  Generic drug:  rosuvastatin   40 mg, Oral, Daily      escitalopram 10 MG tablet  Commonly known as:  LEXAPRO   10 mg, Oral, Daily      esomeprazole 20 MG capsule  Commonly known as:  nexIUM   20 mg, Oral, Every Morning Before Breakfast      insulin lispro 100 UNIT/ML injection  Commonly known as:  humaLOG   3 Units, Subcutaneous, Every Morning      irbesartan 150 MG tablet  Commonly known as:  AVAPRO   150 mg, Oral, Daily      LEVEMIR FLEXTOUCH 100 UNIT/ML injection  Generic drug:  insulin detemir   4 Units, Subcutaneous, Nightly      levothyroxine 125 MCG tablet  Commonly known as:  SYNTHROID, LEVOTHROID   125 mcg, Oral, Daily      magnesium oxide 400 tablet tablet  Commonly known as:  MAG-OX   800 mg, Oral, 2 Times Daily      multivitamin with minerals tablet tablet   1 tablet, Oral, Daily      predniSONE 10 MG tablet  Commonly known as:  DELTASONE   10 mg, Oral, Daily      tacrolimus 1 MG capsule  Commonly known as:  PROGRAF   1 mg, Oral, 2 Times Daily      vitamin D3 5000 units tablet tablet   5,000 Units, Oral, Daily              Additional Instructions for the Follow-ups that You Need to Schedule     Discharge Follow-up with PCP   As directed       Currently Documented PCP:    Ghada Rangel MD    PCP Phone Number:    [phone]     Follow Up Details:  patient to contact pcp and transplant for f/up         CT Chest Without Contrast   Aug 30, 2019        Follow-up Information     Ghada Rangel MD .    Specialty:  Family Medicine  Why:  patient to contact pcp and transplant for f/up  Contact information:  [address] [facility]  [address] [place] [id number]  [phone]                 Test Results Pending at  Discharge   Order Current Status    Ova & Parasite Examination - Stool, Per Rectum In process    Ova & Parasite Examination - Stool, Per Rectum In process    Blood Culture - Blood, Arm, Left Preliminary result    Blood Culture - Blood, Arm, Left Preliminary result         Fran Montesinos MD  06/02/19  9:14 AM    Discharge time spent greater than 30 minutes.

## 2019-06-02 NOTE — PROGRESS NOTES
[facility]  Inpatient Progress Note    Reason for Follow Up:  Nonbloody diarrhea in an immunocompromised patient post kidney transplant.    Subjective     Interval History:   Path from colonoscopy normal with no viral inclusions. No diarrhea. On soft BM yesterday.     Current Facility-Administered Medications:   •  acetaminophen (TYLENOL) tablet 650 mg, 650 mg, Oral, Q4H PRN, Fina Coronel, APRN, 650 mg at 05/31/19 1532  •  aspirin EC tablet 81 mg, 81 mg, Oral, Daily, Fina Coronel, APRN, 81 mg at 06/01/19 0840  •  carvedilol (COREG) tablet 25 mg, 25 mg, Oral, BID With Meals, Fina Coronel APRN, 25 mg at 06/01/19 1730  •  dextrose (D50W) 25 g/ 50mL Intravenous Solution 25 g, 25 g, Intravenous, Q15 Min PRN, Fina Coronel, APRN  •  dextrose (GLUTOSE) oral gel 15 g, 15 g, Oral, Q15 Min PRN, Fina Coronel APRN  •  escitalopram (LEXAPRO) tablet 10 mg, 10 mg, Oral, Daily, Fina Coronel, APRN, 10 mg at 06/01/19 0840  •  glucagon (human recombinant) (GLUCAGEN DIAGNOSTIC) injection 1 mg, 1 mg, Subcutaneous, PRN, Fina Coronel APRN  •  hydrALAZINE (APRESOLINE) injection 10 mg, 10 mg, Intravenous, Q6H PRN, Fina Coronel, APRN  •  insulin lispro (humaLOG) injection 0-7 Units, 0-7 Units, Subcutaneous, 4x Daily With Meals & Nightly, Fina Coronel APRN, 2 Units at 06/01/19 2158  •  levothyroxine (SYNTHROID, LEVOTHROID) tablet 125 mcg, 125 mcg, Oral, Daily, Fina Coronel APRN, 125 mcg at 06/01/19 0839  •  multivitamin with minerals 1 tablet, 1 tablet, Oral, Daily, Fina Coronel, APRN, 1 tablet at 06/01/19 0840  •  ondansetron (ZOFRAN) tablet 4 mg, 4 mg, Oral, Q6H PRN **OR** ondansetron (ZOFRAN) injection 4 mg, 4 mg, Intravenous, Q6H PRN, Fina Coronel, ROMARIO  •  pantoprazole (PROTONIX) EC tablet 40 mg, 40 mg, Oral, QAM, Fina Coronel APRN, 40 mg at 06/02/19 0654  •  predniSONE (DELTASONE) tablet 10 mg, 10 mg, Oral, Daily, Fina Coronel APRN, 10 mg at 06/01/19  0841  •  predniSONE (DELTASONE) tablet 20 mg, 20 mg, Oral, Daily With Breakfast, Iglesia Dobson MD, 20 mg at 06/01/19 0840  •  rosuvastatin (CRESTOR) tablet 40 mg, 40 mg, Oral, Daily, EnglishFian C, APRN, 40 mg at 06/01/19 0840  •  sodium chloride 0.9 % flush 1-10 mL, 1-10 mL, Intravenous, PRN, EnglishFina, APRN  •  [COMPLETED] Insert peripheral IV, , , Once **AND** sodium chloride 0.9 % flush 10 mL, 10 mL, Intravenous, PRN, Valentin Khoury MD  •  sodium chloride 0.9 % flush 3 mL, 3 mL, Intravenous, Q12H, Fina Coronel, APRN, 3 mL at 06/01/19 2039  •  tacrolimus (PROGRAF) capsule 1 mg, 1 mg, Oral, Q12H, English, Fina C, APRN, 1 mg at 06/01/19 2158  Review of Systems:    The following systems were reviewed and negative;  respiratory, cardiovascular, musculoskeletal and neurological    Objective     Vital Signs  Temp:  [97.1 °F (36.2 °C)-98.1 °F (36.7 °C)] 98.1 °F (36.7 °C)  Heart Rate:  [61-68] 66  Resp:  [16] 16  BP: (100-146)/(64-76) 146/76  Body mass index is 38.6 kg/m².    Intake/Output Summary (Last 24 hours) at 6/2/2019 0821  Last data filed at 6/2/2019 0556  Gross per 24 hour   Intake 650 ml   Output 1600 ml   Net -950 ml     No intake/output data recorded.     Physical Exam:   General: patient awake, alert and cooperative   Eyes: Normal lids and lashes, no scleral icterus   Neck: supple, normal ROM   Skin: warm and dry, not jaundiced   Cardiovascular: regular rhythm and rate, no murmurs auscultated   Pulm: clear to auscultation bilaterally, regular and unlabored   Abdomen: soft, nontender, nondistended; normal bowel sounds   Rectal: deferred   Extremities: no rash or edema   Psychiatric: Normal mood and behavior; memory intact     Results Review:     I reviewed the patient's new clinical results.    Results from last 7 days   Lab Units 05/31/19  0546 05/30/19  0547 05/29/19  1655   WBC 10*3/mm3 5.28 4.96 5.35   HEMOGLOBIN g/dL 10.5* 10.3* 11.5*   HEMATOCRIT % 34.3 33.5* 37.1   PLATELETS  10*3/mm3 220 211 251     Results from last 7 days   Lab Units 06/02/19  0454 06/01/19  0347 05/31/19  0546  05/29/19  1655   SODIUM mmol/L 142 135* 134*   < > 138   POTASSIUM mmol/L 4.9 4.4 4.2   < > 4.8   CHLORIDE mmol/L 106 100 100   < > 99   CO2 mmol/L 23.6 21.7* 24.3   < > 23.4   BUN mg/dL 16 10 13   < > 25*   CREATININE mg/dL 0.72 0.67 0.78   < > 1.26*   CALCIUM mg/dL 9.1 9.3 8.8   < > 9.2   BILIRUBIN mg/dL  --   --  0.4  --  0.5   ALK PHOS U/L  --   --  53  --  58   ALT (SGPT) U/L  --   --  14  --  20   AST (SGOT) U/L  --   --  15  --  20   GLUCOSE mg/dL 125* 174* 97   < > 187*    < > = values in this interval not displayed.     Results from last 7 days   Lab Units 05/31/19  0546   INR  1.07     No results found for: LIPASE    Radiology:  CT Chest Without Contrast   Final Result   There has been interval increase in the 6 mm pleural-based   nodule at the right upper lobe and there are several new tiny nodular   opacities bilaterally. There is also a new small ill-defined right upper   lobe groundglass opacity. The shotty mediastinal nodes are slightly   larger as well. These findings are nonspecific, but the increase in size   of the 6 mm right pulmonary nodule is concerning. The new tiny opacities   may represent infectious or inflammatory infiltrates. These opacities   are too small for PET characterization. Reevaluation is recommended with   a chest CT in 3 months.       This report was finalized on 5/31/2019 6:47 AM by Dr. Zulma Hillman M.D.          XR Chest 1 View   Final Result      CT Abdomen Pelvis Without Contrast   Final Result   There is evidence of prior transplant in the left lower quadrant. No   evidence for an active or acute intra-abdominal or pelvic process is   appreciated. The appearance of the abdominal and pelvic structures is   not significantly changed when compared to the prior examination of   10/15/2018.       Radiation dose reduction techniques were utilized, including automated    exposure control and exposure modulation based on body size.       This report was finalized on 5/29/2019 8:56 PM by Dr. Christos Stewart M.D.          CT Chest Without Contrast    (Results Pending)       Assessment/Plan     Patient Active Problem List   Diagnosis   • Type 2 diabetes mellitus (CMS/HCC)   • Benign hypertension   • Hyperlipidemia   • Renal transplant recipient   • Hepatitis C, treated   • Carcinoma of right kidney, s/p nephrectomy   • Neuropathy   • Immunocompromised (CMS/HCC)   • Neutropenia (CMS/HCC)   • Anemia   • Charcot's arthropathy   • Mixed anxiety depressive disorder   • Dyspnea on exertion   • Insomnia   • Mitral valve insufficiency   • Peripheral edema   • Postablative hypothyroidism   • Diarrhea   • Pulmonary nodule       I discussed the patients findings and my recommendations with patient.    Assessment/Recommendations:  1) Diarrhea in a patient on immunosuppressant/immunocompromised. Certainly Prograf could cause diarrhea but I would defer to the Renal Transplant folks at [facility]. She could be discharged from a GI standpoint. I would have her f/u with them in the next one week. GI will sign off and see prn.    Aaron Mayes MD

## 2019-06-03 ENCOUNTER — READMISSION MANAGEMENT (OUTPATIENT)
Dept: CALL CENTER | Facility: HOSPITAL | Age: 69
End: 2019-06-03

## 2019-06-03 LAB
BACTERIA SPEC AEROBE CULT: NORMAL
BACTERIA SPEC AEROBE CULT: NORMAL

## 2019-06-03 NOTE — OUTREACH NOTE
Prep Survey      Responses   Facility patient discharged from?  [place]   Is patient eligible?  Yes   Discharge diagnosis  Diarrhea, pulmonary nodule, anemia, DM II, benign HTN, HLD, Renal transplant recipient, Hep,C. treated, DANNY, s/p colonoscopy with biopsies   Does the patient have one of the following disease processes/diagnoses(primary or secondary)?  Other   Does the patient have Home health ordered?  No   Is there a DME ordered?  No   Comments regarding appointments  Pt to schedule follow up appointments with PCP and transplant team   Prep survey completed?  Yes          Ladonna Sanders RN

## 2019-06-05 ENCOUNTER — READMISSION MANAGEMENT (OUTPATIENT)
Dept: CALL CENTER | Facility: HOSPITAL | Age: 69
End: 2019-06-05

## 2019-06-05 LAB
O+P SPEC MICRO: NORMAL
O+P SPEC MICRO: NORMAL
OVA + PARASITE RESULT 1: NORMAL
OVA + PARASITE RESULT 1: NORMAL

## 2019-06-05 NOTE — OUTREACH NOTE
Medical Week 1 Survey      Responses   Facility patient discharged from?  [place]   Does the patient have one of the following disease processes/diagnoses(primary or secondary)?  Other   Is there a successful TCM telephone encounter documented?  No   Week 1 attempt successful?  Yes   Call start time  1555   Call end time  1559   Medication alerts for this patient  no new medications were added   Meds reviewed with patient/caregiver?  N/A   Is the patient having any side effects they believe may be caused by any medication additions or changes?  Yes   Is the patient taking all medications as directed (includes completed medication regime)?  Yes   Comments regarding appointments   will seeing the transplant team  and regualr provider on Monday   Does the patient have a primary care provider?   Yes   Does the patient have an appointment with their PCP within 7 days of discharge?  N/A   Has home health visited the patient within 72 hours of discharge?  N/A   Did the patient receive a copy of their discharge instructions?  Yes   Nursing interventions  Reviewed instructions with patient   What is the patient's perception of their health status since discharge?  Same   Is the patient/caregiver able to teach back signs and symptoms related to disease process for when to call PCP?  Yes   Is the patient/caregiver able to teach back signs and symptoms related to disease process for when to call 911?  Yes   Is the patient/caregiver able to teach back the hierarchy of who to call/visit for symptoms/problems? PCP, Specialist, Home health nurse, Urgent Care, ED, 911  Yes   Week 1 call completed?  Yes   Wrap up additional comments  feels she has not improved, bp is low 90/55 but today is 115/70, has left on of her bp meds off, encouraged to call her provider to may be be seen sooner          Kayleigh Connell RN

## 2019-06-10 RX ORDER — ESCITALOPRAM OXALATE 20 MG/1
TABLET ORAL
Qty: 90 TABLET | Refills: 1 | Status: SHIPPED | OUTPATIENT
Start: 2019-06-10 | End: 2019-12-07 | Stop reason: SDUPTHER

## 2019-06-11 ENCOUNTER — OFFICE VISIT (OUTPATIENT)
Dept: FAMILY MEDICINE CLINIC | Facility: CLINIC | Age: 69
End: 2019-06-11

## 2019-06-11 VITALS
BODY MASS INDEX: 34.45 KG/M2 | HEART RATE: 78 BPM | OXYGEN SATURATION: 94 % | WEIGHT: 201.8 LBS | SYSTOLIC BLOOD PRESSURE: 110 MMHG | TEMPERATURE: 98.5 F | DIASTOLIC BLOOD PRESSURE: 54 MMHG | HEIGHT: 64 IN | RESPIRATION RATE: 19 BRPM

## 2019-06-11 DIAGNOSIS — E11.59 TYPE 2 DIABETES MELLITUS WITH OTHER CIRCULATORY COMPLICATION, UNSPECIFIED WHETHER LONG TERM INSULIN USE (HCC): ICD-10-CM

## 2019-06-11 DIAGNOSIS — R91.1 PULMONARY NODULE: Primary | ICD-10-CM

## 2019-06-11 DIAGNOSIS — I10 BENIGN HYPERTENSION: ICD-10-CM

## 2019-06-11 DIAGNOSIS — R19.7 DIARRHEA, UNSPECIFIED TYPE: ICD-10-CM

## 2019-06-11 PROCEDURE — 99214 OFFICE O/P EST MOD 30 MIN: CPT | Performed by: FAMILY MEDICINE

## 2019-06-11 RX ORDER — AZATHIOPRINE 50 MG/1
50 TABLET ORAL DAILY
COMMUNITY

## 2019-06-11 NOTE — PROGRESS NOTES
Chief Complaint   Patient presents with   • Acute Renal Failure     hospital encounter    • Consult     pulm referral       Subjective   Diana Gee is a 69 y.o. female.     History of Present Illness   She was admitted to  5/22-5/25/2019 related to significant diarrhea.  She also had AK I in the setting of renal transplantation.  She had extensive work-up including imaging of her chest, abdomen, pelvis.  She also had colonoscopy, stool culture, blood culture.  It was thought to be that her diarrhea was related to an enterocolitis based on CT imaging though nothing was positive on cultures versus her dosing of regular magnesium and Myfortic.  Her diarrhea is intermittent after discharge.  She said her bowel movements are inconsistent.  She will have a day where she does not go to the bathroom and then the next day will be semi-formed and then it will be fairly loose and then it will go back to semi-formed and then she will go for a day.  There is a lot of variability.  Definitely improved from hospital.    D/c 5/25/19 on prednisone she was taken off mifortic related to the diarrhea. She was given one week of the prednisone  Then only on the prograf.  Yesterday she saw nephrology and was put on prednisone 10 mg daily and imuran.     DM  She is using 4 units at night of the long acting and 3 units at lunch.   She has never been on pills for the diabetes. She was dx she was put on insulin and on much higher dose. She is followed by Dr. Farias with endocrinology and has follow-up with her in 2 months.  She did just receive a 90-day supply of her insulins. She commonly does not use the meal time.  Endocrine also follows her for her hypothyroidism.  They did labs in February and she will have follow-up labs in 2 months prior to that visit.  Her TSH was mildly elevated at 7.91 but her T4 was normal.  She did have adjustment in her thyroid medication at that time.    Hypertension  Feels her BP is running lower. She  "feels overall good since   She takes her BP in the evening before bed and if lower    Want her to get referral for pulmonologist. She had nodule on the lung.   The following portions of the patient's history were reviewed and updated as appropriate: allergies, current medications, past medical history, past social history, past surgical history and problem list.    Review of Systems   Respiratory: Negative.    Cardiovascular: Negative.    Neurological: Positive for dizziness.       Vitals:    06/11/19 1205   BP: 110/54   BP Location: Left arm   Patient Position: Sitting   Cuff Size: Adult   Pulse: 78   Resp: 19   Temp: 98.5 °F (36.9 °C)   TempSrc: Oral   SpO2: 94%   Weight: 91.5 kg (201 lb 12.8 oz)   Height: 162.6 cm (64\")       Objective   Physical Exam   Constitutional: She is oriented to person, place, and time. She appears well-nourished. No distress.   Eyes: Conjunctivae are normal. Right eye exhibits no discharge. Left eye exhibits no discharge. No scleral icterus.   Cardiovascular: Normal rate, regular rhythm, normal heart sounds and intact distal pulses. Exam reveals no gallop and no friction rub.   No murmur heard.  Pulmonary/Chest: Effort normal and breath sounds normal. No respiratory distress. She has no wheezes.   Musculoskeletal: She exhibits no edema.   Neurological: She is alert and oriented to person, place, and time.   Psychiatric: She has a normal mood and affect. Her behavior is normal.   Vitals reviewed.      Assessment/Plan   Diana was seen today for acute renal failure and consult.    Diagnoses and all orders for this visit:    Pulmonary nodule  -     Ambulatory Referral to Pulmonology    Type 2 diabetes mellitus with other circulatory complication, unspecified whether long term insulin use (CMS/[facility])    Benign hypertension    Diarrhea, unspecified type        Related to her blood pressure and lower blood pressure readings here and at home we will make a minor adjustment to her blood " pressure medications.  She already had her carvedilol changed.  She is taking half in the morning of the 25 mg tablet and now she started taking half in the evening.  She also has been taking her blood pressure at bedtime and if it is below a certain level she will not take it.  We are going to go ahead and cut her Eulalio Marquise in half.  Patient has a pill cutter and I discussed this with her.  She just got a supply of both her carvedilol 25 mg and her irbesartan 150 mg.  Her carvedilol was already adjusted and I told her to continue doing what she is doing half a tablet in the morning and then half a tablet in the evening contingent on her blood pressure.  She is going to call the office in 4 days with her blood pressure readings for the days between now and then and will decide if we need any further changes.  Hopefully her blood pressure will come up a little bit and she will be a little more energized with that.    The hospital team did recommend to DC her insulin and have her take oral medication for her blood sugar control.  Given her sugar readings and how little insulin she is on and her last A1c being 5.9, she may be able to be diet controlled.  She just got a whole treatment of her Levemir and mealtime insulin so I advised her to continue the Levemir until she sees her endocrinologist.  She can use mealtime as needed but sounds like she is not using that very often.  We will change the Levemir dose since she is not really using the mealtime.    Labs all improving at discharge. She will have full labs again with endocrinology. Pt is overall doing well at this time.     CT chest without contrast reviewed report from 5/30/2019.  It did report the demonstration of the interval increase in the pleural-based nodule at the right upper lobe.  There were also several new tiny nodular opacities seen bilaterally.  In addition to that there is a small ill-defined right upper lobe groundglass opacity.  Shotty  mediastinal lymph nodes are slightly enlarged from prior study as well.  There is a right lower paratracheal node 1 x 0.9 cm that was previously 0.7 x 0.6 cm.  Report the interval increase in the pulmonary nodules concerning and thought that there would not be as much utility with PET scan given the sizes of the nodules however reevaluating in 3 months is certainly recommended.  Comparison scan is from October 15, 2018.    Colonoscopy was performed without prep given patient's diarrhea.  The goal is to get some gross for review and biopsies for microscopic colitis or CMV.  Pathology report without adenomatous or hyperplastic changes, no significant inflammation, no granulomas, no viral inclusions or other organisms on routine stain, just noted prominent lymphoid aggregates.  O&P results were negative.

## 2019-06-13 ENCOUNTER — READMISSION MANAGEMENT (OUTPATIENT)
Dept: CALL CENTER | Facility: HOSPITAL | Age: 69
End: 2019-06-13

## 2019-06-13 NOTE — OUTREACH NOTE
Medical Week 2 Survey      Responses   Facility patient discharged from?  [place]   Does the patient have one of the following disease processes/diagnoses(primary or secondary)?  Other   Week 2 attempt successful?  Yes   Call start time  1109   Discharge diagnosis  Diarrhea, pulmonary nodule, anemia, DM II, benign HTN, HLD, Renal transplant recipient, Hep,C. treated, DANNY, s/p colonoscopy with biopsies   Call end time  1111   Medication alerts for this patient  no new medications were added   Meds reviewed with patient/caregiver?  N/A   Is the patient having any side effects they believe may be caused by any medication additions or changes?  No   Is the patient taking all medications as directed (includes completed medication regime)?  Yes   Medication comments  Pt reports the transplant team adjusted her medication, and place her on Inuran and Prednisone. D/c'd Mycortic.   Comments regarding appointments   will seeing the transplant team  and regualr provider on Monday   Does the patient have a primary care provider?   Yes   Does the patient have an appointment with their PCP within 7 days of discharge?  Greater than 7 days   Nursing Interventions  Verified appointment date/time/provider   Has the patient kept scheduled appointments due by today?  Yes   Has home health visited the patient within 72 hours of discharge?  N/A   Psychosocial issues?  No   Did the patient receive a copy of their discharge instructions?  Yes   Nursing interventions  Reviewed instructions with patient   What is the patient's perception of their health status since discharge?  Improving   Is the patient/caregiver able to teach back signs and symptoms related to disease process for when to call PCP?  Yes   Is the patient/caregiver able to teach back signs and symptoms related to disease process for when to call 911?  Yes   Is the patient/caregiver able to teach back the hierarchy of who to call/visit for symptoms/problems? PCP,  Specialist, Home health nurse, Urgent Care, ED, 911  Yes   Week 2 Call Completed?  Yes          Rudi Talley RN

## 2019-06-20 ENCOUNTER — READMISSION MANAGEMENT (OUTPATIENT)
Dept: CALL CENTER | Facility: HOSPITAL | Age: 69
End: 2019-06-20

## 2019-06-20 NOTE — OUTREACH NOTE
Medical Week 3 Survey      Responses   Facility patient discharged from?  [place]   Does the patient have one of the following disease processes/diagnoses(primary or secondary)?  Other   Week 3 attempt successful?  No   Unsuccessful attempts  Attempt 1          Quita Doty RN

## 2019-06-21 ENCOUNTER — TELEPHONE (OUTPATIENT)
Dept: GASTROENTEROLOGY | Facility: CLINIC | Age: 69
End: 2019-06-21

## 2019-06-21 NOTE — TELEPHONE ENCOUNTER
----- Message from Cam Shin MD sent at 6/3/2019 12:06 PM EDT -----  Pathology benign, no microscopic colitis or CMV  Follow-up with PCP and hematologist as scheduled

## 2019-06-22 ENCOUNTER — READMISSION MANAGEMENT (OUTPATIENT)
Dept: CALL CENTER | Facility: HOSPITAL | Age: 69
End: 2019-06-22

## 2019-06-22 NOTE — OUTREACH NOTE
Medical Week 3 Survey      Responses   Facility patient discharged from?  [place]   Does the patient have one of the following disease processes/diagnoses(primary or secondary)?  Other   Week 3 attempt successful?  No   Unsuccessful attempts  Attempt 2          Ladonna Bryant RN

## 2019-06-26 ENCOUNTER — OFFICE VISIT (OUTPATIENT)
Dept: CARDIOLOGY | Facility: CLINIC | Age: 69
End: 2019-06-26

## 2019-06-26 VITALS
WEIGHT: 210.4 LBS | HEIGHT: 64 IN | DIASTOLIC BLOOD PRESSURE: 78 MMHG | BODY MASS INDEX: 35.92 KG/M2 | SYSTOLIC BLOOD PRESSURE: 112 MMHG | HEART RATE: 66 BPM

## 2019-06-26 DIAGNOSIS — E78.2 MIXED HYPERLIPIDEMIA: ICD-10-CM

## 2019-06-26 DIAGNOSIS — I51.89 DIASTOLIC DYSFUNCTION: Primary | ICD-10-CM

## 2019-06-26 DIAGNOSIS — I34.0 NON-RHEUMATIC MITRAL REGURGITATION: ICD-10-CM

## 2019-06-26 DIAGNOSIS — C64.1 CARCINOMA OF RIGHT KIDNEY (HCC): ICD-10-CM

## 2019-06-26 DIAGNOSIS — Z94.0 RENAL TRANSPLANT RECIPIENT: ICD-10-CM

## 2019-06-26 DIAGNOSIS — E11.59 TYPE 2 DIABETES MELLITUS WITH OTHER CIRCULATORY COMPLICATION, UNSPECIFIED WHETHER LONG TERM INSULIN USE (HCC): ICD-10-CM

## 2019-06-26 DIAGNOSIS — I10 BENIGN HYPERTENSION: ICD-10-CM

## 2019-06-26 PROCEDURE — 99214 OFFICE O/P EST MOD 30 MIN: CPT | Performed by: INTERNAL MEDICINE

## 2019-06-26 PROCEDURE — 93000 ELECTROCARDIOGRAM COMPLETE: CPT | Performed by: INTERNAL MEDICINE

## 2019-06-26 RX ORDER — MAGNESIUM OXIDE 400 MG/1
800 TABLET ORAL 2 TIMES DAILY
COMMUNITY

## 2019-08-13 ENCOUNTER — APPOINTMENT (OUTPATIENT)
Dept: WOMENS IMAGING | Facility: HOSPITAL | Age: 69
End: 2019-08-13

## 2019-08-13 PROCEDURE — 77063 BREAST TOMOSYNTHESIS BI: CPT | Performed by: RADIOLOGY

## 2019-08-13 PROCEDURE — 77067 SCR MAMMO BI INCL CAD: CPT | Performed by: RADIOLOGY

## 2019-09-11 ENCOUNTER — HOSPITAL ENCOUNTER (OUTPATIENT)
Dept: CT IMAGING | Facility: HOSPITAL | Age: 69
Discharge: HOME | End: 2019-09-11
Admitting: HOSPITALIST

## 2019-09-11 DIAGNOSIS — R91.1 PULMONARY NODULE: ICD-10-CM

## 2019-09-11 PROCEDURE — 71250 CT THORAX DX C-: CPT

## 2019-09-25 ENCOUNTER — TRANSCRIBE ORDERS (OUTPATIENT)
Dept: ADMINISTRATIVE | Facility: HOSPITAL | Age: 69
End: 2019-09-25

## 2019-09-25 DIAGNOSIS — R91.1 LUNG NODULE: Primary | ICD-10-CM

## 2019-12-09 RX ORDER — ESCITALOPRAM OXALATE 20 MG/1
TABLET ORAL
Qty: 90 TABLET | Refills: 1 | Status: SHIPPED | OUTPATIENT
Start: 2019-12-09 | End: 2020-06-09

## 2020-02-19 NOTE — PROGRESS NOTES
Date of Office Visit: 2019  Encounter Provider: Ragini Capellan MD  Place of Service: [facility]  Patient Name: Diana Gee  :1950      Patient ID:  Diana Gee is a 69 y.o. female is here for  followup for hypertension, diastolic dysfunction, mitral insufficiency        History of Present Illness    She follows with Dr. Soares. She has a long history of mainly renal issues. She  developed glomerulonephritis in  and wound up requiring transplantation for kidney  failure. She had dialysis prior to this. She then developed diabetes and, with  progressive diabetes, her transplanted kidney failed. She was also found to have cancer  of her kidney. I am not sure if it was a transplanted kidney or her native which was  removed. She wound up back on dialysis in  and has a fistula that is functional still  in her right upper extremity. The prior left upper extremity AV fistula had failed. She  was on dialysis until  when she was transplanted again.      She does have a known history of hepatitis C. She has also had a deep venous thrombosis  in her right leg in  during a hospitalization. She has a history of gout and thyroid disease.   She has diabetes mellitus, hypertension, and hyperlipidemia, and she had rheumatic fever as a child.      She did have a cardiac catheterization done prior to her transplantation in . It was  done at [facility] or [facility], one of those two. We will get those  results.      She had an echocardiogram performed in 2015 which revealed an ejection fraction of 55-60%, grade II diastolic dysfunction, trace to mild mitral insufficiency, severe left atrial enlargement and calcification of the LVOT without obstruction.  She had lower extremity arterial Doppler studies performed in 2015 and these were normal.  I did find a heart catheterization from 2011 and this revealed normal  Health Maintenance Due   Topic Date Due   • DTaP/Tdap/Td Vaccine (1 - Tdap) 02/21/2008   • Influenza Vaccine (1) 09/01/2019     Here to establish  Patient is due for topics listed above, he, but is not proceeding with Immunization(s) Dtap/Tdap/Td and Influenza at this time.       Due for flu--refused  Due for Tdap--refused   left and right sided filling pressures, mild coronary disease, normal left ventricular systolic function, no pulmonary artery hypertension.  There were really only luminal irregularities noted in the LAD.  All the other vessels showed no disease at all.        She had a normal stress PET study in 06/2015.  This was obtained secondary to dyspnea on exertion.       She had basic metabolic panel done 6/2/2019 with potassium 4.9, creatinine 0.72.    She has no chest pain or pressure.  She is now following with Dr. Triny Richmond at [facility] for her renal transplant.  She is taking her medications as directed and has not had issues.  She had a severe issue earlier in the spring with Myfortic causing diarrhea and fevers.  She was hospitalized and this was discontinued with the initiation of prednisone.  She had no tachycardia, dizziness, syncope.  She has no orthopnea or PND.  She has minimal lower extremity edema.  She has been feeling quite well.    Past Medical History:   Diagnosis Date   • Abnormal glucose    • Acute bronchitis    • Acute upper respiratory infection    • Anemia    • C. difficile diarrhea 2008    [facility]   • Carrier of viral hepatitis C (CMS/HCC)     Declared viral free in Sept '14  Dr Julio Serna  [facility]   • Cellulitis of foot    • Cerumen impaction    • Cerumen impaction    • Charcot's joint    • Depression with anxiety    • Diabetes mellitus (CMS/HCC)    • Disease of thyroid gland    • Failed kidney transplant    • Gastric ulcer    • GERD (gastroesophageal reflux disease)    • Gout    • Graves disease    • Heart murmur    • Hepatitis C    • Hyperlipidemia    • Hypertension    • Hypothyroidism    • Insomnia    • Kidney carcinoma (CMS/HCC)    • Kidney failure    • MR (mitral regurgitation)     moderate, with cunningham dysfctn, severe LAE, mod OLENA, aortic thickening without insuff- Dr Horn   • Neuropathy    • Osteomyelitis of toe (CMS/HCC)    • Osteoporosis    • Peripheral edema    •  Pes planus    • Subconjunctival hemorrhage    • Transplant recipient 1995, 2011    BILATERAL KIDNEYS   • Type 2 diabetes mellitus with diabetic mononeuropathy (CMS/HCC)          Past Surgical History:   Procedure Laterality Date   • AMPUTATION DIGIT Left 6/9/2016    Procedure: LT 4TH TOE AMPUTATION;  Surgeon: Navjot Gandara MD;  Location:  [facility];  Service:    • AMPUTATION DIGIT Left 12/15/2016    Procedure: LT THIRD TOE AMPUTATION;  Surgeon: Navjot Gandara MD;  Location: [facility] MAIN OR;  Service:    • ARTERIOVENOUS FISTULA Bilateral 1992-LEFT ARM, 2008-RIGHT ARM   • CHOLECYSTECTOMY  1992   • COLONOSCOPY N/A 5/31/2019    Procedure: COLONOSCOPY WITHOUT PREP WITH BIOPSIES;  Surgeon: Cam Shin MD;  Location[facility];  Service: Gastroenterology   • EYE SURGERY Bilateral 1997, 1998    CATARACTS   • JOINT REPLACEMENT Left 2001    SHOULDER   • NEPHRECTOMY Right 2008    CANCER   • SKIN GRAFT Right 2012    LEG   • TOE AMPUTATION Left 2014    FIFTH TOE   • TONSILLECTOMY  1964   • TRANSPLANTATION RENAL Left 1995 2011-RIGHT KIDNEY TRANSPLANT   • TUBAL ABDOMINAL LIGATION         Current Outpatient Medications on File Prior to Visit   Medication Sig Dispense Refill   • aspirin 81 MG EC tablet Take 81 mg by mouth Daily.     • azaTHIOprine (IMURAN) 50 MG tablet Take 50 mg by mouth Daily. 3 tablets once daily     • carvedilol (COREG) 25 MG tablet Take 25 mg by mouth 2 (Two) Times a Day With Meals. Patient is taking 1/2 tablet in am and whole tablet in evening     • Cholecalciferol (VITAMIN D3) 5000 units tablet tablet Take 5,000 Units by mouth Daily.     • escitalopram (LEXAPRO) 20 MG tablet TAKE 1 TABLET DAILY 90 tablet 1   • esomeprazole (nexIUM) 20 MG capsule Take 20 mg by mouth Every Morning Before Breakfast.     • insulin detemir (LEVEMIR FLEXTOUCH) 100 UNIT/ML injection Inject 4 Units under the skin into the appropriate area as directed Every Night.     • insulin lispro (humaLOG) 100  UNIT/ML injection Inject 3 Units under the skin into the appropriate area as directed Every Morning.     • irbesartan (AVAPRO) 150 MG tablet Take 150 mg by mouth Daily.     • levothyroxine (SYNTHROID, LEVOTHROID) 125 MCG tablet Take 125 mcg by mouth Daily.     • magnesium oxide (MAG-OX) 400 MG tablet Take 800 mg by mouth 2 (Two) Times a Day.     • Multiple Vitamins-Minerals (MULTIVITAMIN WITH MINERALS) tablet tablet Take 1 tablet by mouth Daily.     • predniSONE (DELTASONE) 10 MG tablet Take 5 mg by mouth Daily.     • rosuvastatin (CRESTOR) 40 MG tablet Take 40 mg by mouth Daily.     • tacrolimus (PROGRAF) 1 MG capsule Take 1 mg by mouth 2 (Two) Times a Day.       No current facility-administered medications on file prior to visit.        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   HENT: Negative for congestion.    Eyes: Negative for vision loss in left eye and vision loss in right eye.   Respiratory: Negative.  Negative for cough, hemoptysis, shortness of breath, sleep disturbances due to breathing, snoring, sputum production and wheezing.    Endocrine: Negative.    Hematologic/Lymphatic: Negative.    Skin: Negative for poor wound healing and rash.   Musculoskeletal: Negative for falls, gout, muscle cramps and myalgias.   Gastrointestinal: Negative for abdominal pain,  "diarrhea, dysphagia, hematemesis, melena, nausea and vomiting.   Neurological: Negative for excessive daytime sleepiness, dizziness, headaches, light-headedness, loss of balance, seizures and vertigo.   Psychiatric/Behavioral: Negative for depression and substance abuse. The patient is not nervous/anxious.        Procedures    ECG 12 Lead  Date/Time: 6/26/2019 1:14 PM  Performed by: Ragini Capellan MD  Authorized by: Ragini Capellan MD   Comparison: compared with previous ECG   Similar to previous ECG  Rhythm: sinus rhythm    Clinical impression: normal ECG                Objective:      Vitals:    06/26/19 1304   BP: 112/78   BP Location: Left arm   Patient Position: Sitting   Pulse: 66   Weight: 95.4 kg (210 lb 6.4 oz)   Height: 162.6 cm (64\")     Body mass index is 36.12 kg/m².    Physical Exam   Constitutional: She is oriented to person, place, and time. She appears well-developed and well-nourished. No distress.   HENT:   Head: Normocephalic and atraumatic.   Eyes: Conjunctivae are normal. No scleral icterus.   Neck: Neck supple. No JVD present. Carotid bruit is not present. No thyromegaly present.   Cardiovascular: Normal rate, regular rhythm, S1 normal, S2 normal, normal heart sounds and intact distal pulses.  No extrasystoles are present. PMI is not displaced. Exam reveals no gallop.   No murmur heard.  Pulses:       Carotid pulses are 2+ on the right side, and 2+ on the left side.       Radial pulses are 2+ on the right side, and 2+ on the left side.        Dorsalis pedis pulses are 2+ on the right side, and 2+ on the left side.        Posterior tibial pulses are 2+ on the right side, and 2+ on the left side.   Pulmonary/Chest: Effort normal and breath sounds normal. No respiratory distress. She has no wheezes. She has no rhonchi. She has no rales. She exhibits no tenderness.   Abdominal: Soft. Bowel sounds are normal. She exhibits no distension, no abdominal bruit and no mass. There is no " tenderness.   Musculoskeletal: She exhibits no edema or deformity.   Lymphadenopathy:     She has no cervical adenopathy.   Neurological: She is alert and oriented to person, place, and time. No cranial nerve deficit.   Skin: Skin is warm and dry. No rash noted. She is not diaphoretic. No cyanosis. No pallor. Nails show no clubbing.   Psychiatric: She has a normal mood and affect. Judgment normal.   Vitals reviewed.      Lab Review:       Assessment:      Diagnosis Plan   1. Diastolic dysfunction     2. Non-rheumatic mitral regurgitation     3. Benign hypertension     4. Mixed hyperlipidemia     5. Carcinoma of right kidney, s/p nephrectomy     6. Type 2 diabetes mellitus with other circulatory complication, unspecified whether long term insulin use (CMS/HCC)     7. Renal transplant recipient           1. Mild mitral insufficiency, on echocardiogram 6/2015.   2. Aortic valve calcification.   3. Normal left ventricular systolic function with an ejection fraction of 53%.   4. Grade II diastolic dysfunction.   5. History of renal failure status post kidney transplantation done twice.   6. History of kidney cancer.   7. History of glomerulonephritis. She follows with Dr. Richmond.    8. Osteoarthritis.   9. Gout is stable.   10. Hepatitis C is stable.   11. History of gastric ulcer is stable.   12. Blood clots in the right leg, she now has increasing LE edema on the right, set up venous duplex   study.   13. Hypertension well controlled at this point. She does have diastolic dysfunction which  probably is a result of this.   14. Diabetes mellitus type 2. s/p left toe amputation 1/2014 and 12/2016.  15. Hyperlipidemia.   16. History of rheumatic fever as a child. I do not think that is the source of her  current valve issue.   17. Normal PET stress 6/2015.      Plan:       See vanesa in 1 year, no changes.  Check echo after next visit.

## 2020-06-09 RX ORDER — ESCITALOPRAM OXALATE 20 MG/1
TABLET ORAL
Qty: 90 TABLET | Refills: 1 | Status: SHIPPED | OUTPATIENT
Start: 2020-06-09 | End: 2021-03-26 | Stop reason: SDUPTHER

## 2020-07-21 ENCOUNTER — OFFICE VISIT (OUTPATIENT)
Dept: CARDIOLOGY | Facility: CLINIC | Age: 70
End: 2020-07-21

## 2020-07-21 VITALS
DIASTOLIC BLOOD PRESSURE: 90 MMHG | WEIGHT: 228.4 LBS | SYSTOLIC BLOOD PRESSURE: 150 MMHG | BODY MASS INDEX: 38.99 KG/M2 | HEART RATE: 68 BPM | HEIGHT: 64 IN

## 2020-07-21 DIAGNOSIS — E78.2 MIXED HYPERLIPIDEMIA: ICD-10-CM

## 2020-07-21 DIAGNOSIS — Z79.4 TYPE 2 DIABETES MELLITUS WITH OTHER CIRCULATORY COMPLICATION, WITH LONG-TERM CURRENT USE OF INSULIN (HCC): ICD-10-CM

## 2020-07-21 DIAGNOSIS — R06.09 DYSPNEA ON EXERTION: Primary | ICD-10-CM

## 2020-07-21 DIAGNOSIS — E11.59 TYPE 2 DIABETES MELLITUS WITH OTHER CIRCULATORY COMPLICATION, WITH LONG-TERM CURRENT USE OF INSULIN (HCC): ICD-10-CM

## 2020-07-21 DIAGNOSIS — I00 RHEUMATIC FEVER: ICD-10-CM

## 2020-07-21 DIAGNOSIS — I38 VALVULAR HEART DISEASE: ICD-10-CM

## 2020-07-21 DIAGNOSIS — R07.2 PRECORDIAL PAIN: ICD-10-CM

## 2020-07-21 DIAGNOSIS — I51.89 DIASTOLIC DYSFUNCTION: ICD-10-CM

## 2020-07-21 DIAGNOSIS — I10 ESSENTIAL HYPERTENSION: ICD-10-CM

## 2020-07-21 DIAGNOSIS — N28.9 KIDNEY DISEASE: ICD-10-CM

## 2020-07-21 PROCEDURE — 99214 OFFICE O/P EST MOD 30 MIN: CPT | Performed by: NURSE PRACTITIONER

## 2020-07-21 PROCEDURE — 93000 ELECTROCARDIOGRAM COMPLETE: CPT | Performed by: NURSE PRACTITIONER

## 2020-07-21 NOTE — PROGRESS NOTES
Date of Office Visit: 2020  Encounter Provider: ROMARIO Xiao  Place of Service: [facility] CARDIOLOGY  Patient Name: Diana Gee  :1950  Primary Cardiologist: Dr. Ragini Capellan     Chief Complaint   Patient presents with   • Follow-up   :     Dear Dr. Ghada Rangel,     HPI: Diana Gee is a pleasant 70 y.o. female who presents today for cardiac follow up. She is a new patient to me and her previous records have been reviewed.    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  She underwent kidney transplantation.  Subsequently she was also found to have cancer of her kidney and the transplant failed.  in , she had a repeat transplant.    She has a known history of type 2 diabetes mellitus, hepatitis C, DVT (), hypertension, hyperlipidemia, and rheumatic fever as a child.    In May 2011 she had a heart catheterization which revealed mild nonobstructive coronary artery disease and normal filling pressures.      In 2015 she had a normal cardiac PET scan.  On 2015 echocardiogram showed the following: EF 55-60%, grade 2 diastolic dysfunction, left atrium volume severely increased, right atrium mildly dilated, moderate mitral annular calcification with trace to mild mitral regurgitation, aortic valve sclerosis, and no LVOT obstruction.    In 2019, she followed up with Dr. Capellan and was doing well at that time.  Dr. Capellan recommended a repeat echocardiogram in .  On 2019, she saw Dr. Magdiel Dougherty    For an incidental lung nodule discovered on CT scan.  He noted that she had a repeat CT chest showing resolution of smaller nodules and a 4-6 mm nodule appears stable since 2018.  Plans to repeat in 1 year.    Today is her follow-up visit.  She reports a midsternal chest tightness and shortness of breath that occurs with exertion and resolves with rest. She is seeing her a  "pulmonologist in the near future.  Occasionally she feels her heartbeat \"harder\", has lower extremity edema, and occasional dizziness.  Her blood pressure has also fluctuated and averages 140/70s at home.    Past Medical History:   Diagnosis Date   • Abnormal glucose    • Acute bronchitis    • Acute upper respiratory infection    • Anemia    • C. difficile diarrhea 2008    [facility]   • Carrier of viral hepatitis C (CMS/HCC)     Declared viral free in Sept '14  Dr Julio Serna  [facility]   • Cellulitis of foot    • Cerumen impaction    • Charcot's joint    • Depression with anxiety    • Diabetes mellitus (CMS/HCC)    • Diastolic dysfunction    • Disease of thyroid gland    • Failed kidney transplant    • Gastric ulcer    • GERD (gastroesophageal reflux disease)    • Gout    • Graves disease    • Heart murmur    • Hepatitis C    • Hyperlipidemia    • Hypertension    • Hypothyroidism    • Insomnia    • Kidney carcinoma (CMS/HCC)    • Kidney failure    • MR (mitral regurgitation)     moderate, with cunningham dysfctn, severe LAE, mod OLENA, aortic thickening without insuff- Dr Horn   • Neuropathy    • Osteomyelitis of toe (CMS/HCC)    • Osteoporosis    • Peripheral edema    • Pes planus    • Subconjunctival hemorrhage    • Transplant recipient 1995, 2011    BILATERAL KIDNEYS   • Type 2 diabetes mellitus with diabetic mononeuropathy (CMS/HCC)        Past Surgical History:   Procedure Laterality Date   • AMPUTATION DIGIT Left 6/9/2016    Procedure: LT 4TH TOE AMPUTATION;  Surgeon: Navjot Gandara MD;  [place] [facility] [place] [facility]  Service:    • AMPUTATION DIGIT Left 12/15/2016    Procedure: LT THIRD TOE AMPUTATION;  Surgeon: Navjot Gandara MD;  Location: [facility] MAIN OR;  Service:    • ARTERIOVENOUS FISTULA Bilateral 1992-LEFT ARM, 2008-RIGHT ARM   • CHOLECYSTECTOMY  1992   • COLONOSCOPY N/A 5/31/2019    Procedure: COLONOSCOPY WITHOUT PREP WITH BIOPSIES;  Surgeon: Cam Shin MD;  Location: [facility] ENDOSCOPY; "  Service: Gastroenterology   • EYE SURGERY Bilateral ,     CATARACTS   • JOINT REPLACEMENT Left     SHOULDER   • NEPHRECTOMY Right 2008    CANCER   • SKIN GRAFT Right 2012    LEG   • TOE AMPUTATION Left 2014    FIFTH TOE   • TONSILLECTOMY  1964   • TRANSPLANTATION RENAL Left 1995-RIGHT KIDNEY TRANSPLANT   • TUBAL ABDOMINAL LIGATION         Social History     Socioeconomic History   • Marital status:      Spouse name: Calvin   • Number of children: Not on file   • Years of education: Not on file   • Highest education level: Not on file   Occupational History   • 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     Employer: [employer]   Tobacco Use   • Smoking status: Former Smoker     Packs/day: 0.50     Years: 4.00     Pack years: 2.00     Last attempt to quit:      Years since quittin.5   • Smokeless tobacco: Never Used   Substance and Sexual Activity   • Alcohol use: No     Comment: caffeine use: 1 cup daily   • Drug use: No   • Sexual activity: Defer       Family History   Problem Relation Age of Onset   • Hypothyroidism Mother    • COPD Mother    • COPD Father        The following portion of the patient's history were reviewed and updated as appropriate: past medical history, past surgical history, past social history, past family history, allergies, current medications, and problem list.    Review of Systems   Constitution: Negative for chills, diaphoresis, fever, malaise/fatigue, night sweats, weight gain and weight loss.   HENT: Negative for hearing loss, nosebleeds, sore throat and tinnitus.    Eyes: Negative for blurred vision, double vision, pain and visual disturbance.   Cardiovascular: Positive for chest pain, dyspnea on exertion and leg swelling. Negative for claudication, cyanosis, irregular heartbeat, near-syncope, orthopnea, palpitations, paroxysmal nocturnal dyspnea and syncope.   Respiratory: Positive for shortness of breath. Negative for  cough, hemoptysis, snoring and wheezing.    Endocrine: Negative for cold intolerance, heat intolerance and polyuria.   Hematologic/Lymphatic: Negative for bleeding problem. Does not bruise/bleed easily.   Skin: Negative for color change, dry skin, flushing and itching.   Musculoskeletal: Negative for falls, joint pain, joint swelling, muscle cramps, muscle weakness and myalgias.   Gastrointestinal: Negative for abdominal pain, constipation, heartburn, melena, nausea and vomiting.   Genitourinary: Negative for dysuria and hematuria.   Neurological: Positive for dizziness. Negative for excessive daytime sleepiness, light-headedness, loss of balance, numbness, paresthesias, seizures and vertigo.   Psychiatric/Behavioral: Negative for altered mental status, depression, memory loss and substance abuse. The patient does not have insomnia and is not nervous/anxious.    Allergic/Immunologic: Negative for environmental allergies.       Allergies   Allergen Reactions   • Heparin Hives   • Levaquin [Levofloxacin] Hives         Current Outpatient Medications:   •  aspirin 81 MG EC tablet, Take 81 mg by mouth Daily., Disp: , Rfl:   •  azaTHIOprine (IMURAN) 50 MG tablet, Take 50 mg by mouth Daily. 3 tablets once daily, Disp: , Rfl:   •  carvedilol (COREG) 25 MG tablet, Take 25 mg by mouth 2 (Two) Times a Day With Meals., Disp: , Rfl:   •  Cholecalciferol (VITAMIN D3) 5000 units tablet tablet, Take 5,000 Units by mouth Daily., Disp: , Rfl:   •  escitalopram (LEXAPRO) 20 MG tablet, TAKE 1 TABLET DAILY, Disp: 90 tablet, Rfl: 1  •  esomeprazole (nexIUM) 20 MG capsule, Take 20 mg by mouth Every Morning Before Breakfast., Disp: , Rfl:   •  insulin detemir (LEVEMIR FLEXTOUCH) 100 UNIT/ML injection, Inject 4 Units under the skin into the appropriate area as directed Every Night., Disp: , Rfl:   •  insulin lispro (humaLOG) 100 UNIT/ML injection, Inject 3 Units under the skin into the appropriate area as directed Every Morning., Disp: ,  "Rfl:   •  irbesartan (AVAPRO) 150 MG tablet, Take 150 mg by mouth Daily., Disp: , Rfl:   •  levothyroxine (SYNTHROID, LEVOTHROID) 125 MCG tablet, Take 125 mcg by mouth Daily., Disp: , Rfl:   •  magnesium oxide (MAG-OX) 400 MG tablet, Take 800 mg by mouth 2 (Two) Times a Day., Disp: , Rfl:   •  Multiple Vitamins-Minerals (MULTIVITAMIN WITH MINERALS) tablet tablet, Take 1 tablet by mouth Daily., Disp: , Rfl:   •  predniSONE (DELTASONE) 10 MG tablet, Take 5 mg by mouth Daily., Disp: , Rfl:   •  rosuvastatin (CRESTOR) 40 MG tablet, Take 40 mg by mouth Daily., Disp: , Rfl:   •  tacrolimus (PROGRAF) 1 MG capsule, Take 1 mg by mouth 2 (Two) Times a Day., Disp: , Rfl:         Objective:     Vitals:    07/21/20 1533   BP: 150/90   BP Location: Left arm   Pulse: 68   Weight: 104 kg (228 lb 6.4 oz)   Height: 162.6 cm (64\")     Body mass index is 39.2 kg/m².    PHYSICAL EXAM:    Vitals Reviewed.   General Appearance: No acute distress, well developed and well nourished. Obese.   Eyes: Conjunctiva and lids: No erythema, swelling, or discharge. Sclera non-icteric.   HENT: Atraumatic, normocephalic. External eyes, ears, and nose normal. No hearing loss noted. Mucous membranes normal. Lips not cyanotic. Neck supple with no tenderness.  Respiratory: No signs of respiratory distress. Respiration rhythm and depth normal.   Clear to auscultation. No rales, crackles, rhonchi, or wheezing auscultated.   Cardiovascular:  Jugular Venous Pressure: Normal  Heart Rate and Rhythm: Normal, Heart Sounds: Normal S1 and S2. No S3 or S4 noted.  Murmurs: No murmurs noted. No rubs, thrills, or gallops.   Lower Extremities: No edema noted.  Gastrointestinal:  Abdomen soft, non-distended, non-tender. Normal bowel sounds. No hepatomegaly.   Musculoskeletal: Normal movement of extremities  Skin and Nails: General appearance normal. No pallor, cyanosis, diaphoresis. Skin temperature normal. No clubbing of fingernails.   Psychiatric: Patient alert and " oriented to person, place, and time. Speech and behavior appropriate. Normal mood and affect.       ECG 12 Lead  Date/Time: 7/21/2020 3:36 PM  Performed by: Tawanna Schwarz APRN  Authorized by: Tawanna Schwarz APRN   Comparison: compared with previous ECG from 6/26/2019  Similar to previous ECG  Rhythm: sinus rhythm  Rate: normal  BPM: 68  Conduction: conduction normal  ST Segments: ST segments normal  T Waves: T waves normal  QRS axis: normal  Other: no other findings    Clinical impression: normal ECG              Assessment:       Diagnosis Plan   1. Dyspnea on exertion  Adult Transthoracic Echo Complete W/ Cont if Necessary Per Protocol    Stress Test With Pet Myocardial Perfusion (MULTI STUDY, REST AND STRESS)   2. Precordial pain  Adult Transthoracic Echo Complete W/ Cont if Necessary Per Protocol    Stress Test With Pet Myocardial Perfusion (MULTI STUDY, REST AND STRESS)   3. Valvular heart disease     4. Diastolic dysfunction     5. Kidney disease     6. Essential hypertension     7. Type 2 diabetes mellitus with other circulatory complication, with long-term current use of insulin (CMS/[facility])     8. Mixed hyperlipidemia     9. Rheumatic fever            Plan:       1/2. Chest Pain and Shortness of Breath: Check echocardiogram and cardiac PET scan.    3.  Valvular Heart Disease: Repeat echocardiogram.    4.  Diastolic Dysfunction: Noted to be grade 2 per echocardiogram 6/2015.    5.  Kidney Disease: History of glomerular nephritis, kidney cancer, and status post kidney transplantation done twice.    6.  Hypertension: Blood pressure is elevated.  Defer to PCP.    7.  Type II Diabetes Mellitus: Follows with her PCP.    8.  Hyperlipidemia: Follows with her PCP.    9.  History of rheumatic fever as a child.    10.  Further recommendations to follow after cardiac testing.    ADDENDUM 8/6/2020:  · Echo showed EF of 65%, aortic valve sclerosis, mild LVH, grade 1A diastolic dysfunction, left atrium moderately  dilated, and mild mitral valve regurgitation.  · Stress test normal.  · She is experiencing dyspnea with incline and I think this may be due to deconditioning.  · I have asked her to call with any worsening symptoms and we will schedule her for a follow-up with Dr. Capellan in 6 months.    As always, it has been a pleasure to participate in your patient's care. Thank you.       Sincerely,         ROMARIO Maldonado        Dictated utilizing Dragon dictation

## 2020-08-04 ENCOUNTER — HOSPITAL ENCOUNTER (OUTPATIENT)
Dept: CARDIOLOGY | Facility: HOSPITAL | Age: 70
End: 2020-08-04

## 2020-08-04 ENCOUNTER — APPOINTMENT (OUTPATIENT)
Dept: CARDIOLOGY | Facility: HOSPITAL | Age: 70
End: 2020-08-04

## 2020-08-06 ENCOUNTER — HOSPITAL ENCOUNTER (OUTPATIENT)
Dept: CARDIOLOGY | Facility: HOSPITAL | Age: 70
Discharge: HOME | End: 2020-08-06
Admitting: NURSE PRACTITIONER

## 2020-08-06 ENCOUNTER — HOSPITAL ENCOUNTER (OUTPATIENT)
Dept: CARDIOLOGY | Facility: HOSPITAL | Age: 70
Discharge: HOME | End: 2020-08-06

## 2020-08-06 VITALS
SYSTOLIC BLOOD PRESSURE: 152 MMHG | DIASTOLIC BLOOD PRESSURE: 92 MMHG | BODY MASS INDEX: 38.93 KG/M2 | HEIGHT: 64 IN | HEART RATE: 72 BPM | WEIGHT: 228 LBS

## 2020-08-06 DIAGNOSIS — R07.2 PRECORDIAL PAIN: ICD-10-CM

## 2020-08-06 DIAGNOSIS — R06.09 DYSPNEA ON EXERTION: ICD-10-CM

## 2020-08-06 LAB
BH CV STRESS BP STAGE 1: NORMAL
BH CV STRESS COMMENTS STAGE 1: NORMAL
BH CV STRESS DOSE REGADENOSON STAGE 1: 0.4
BH CV STRESS DURATION MIN STAGE 1: 0
BH CV STRESS DURATION SEC STAGE 1: 10
BH CV STRESS HR STAGE 1: 75
BH CV STRESS PROTOCOL 1: NORMAL
BH CV STRESS RECOVERY BP: NORMAL MMHG
BH CV STRESS RECOVERY HR: 74 BPM
BH CV STRESS STAGE 1: 1
LV EF NUC BP: 71 %
MAXIMAL PREDICTED HEART RATE: 152 BPM
PERCENT MAX PREDICTED HR: 49.34 %
STRESS BASELINE BP: NORMAL MMHG
STRESS BASELINE HR: 59 BPM
STRESS PERCENT HR: 58 %
STRESS POST EXERCISE DUR SEC: 10 SEC
STRESS POST PEAK BP: NORMAL MMHG
STRESS POST PEAK HR: 75 BPM
STRESS TARGET HR: 129 BPM

## 2020-08-06 PROCEDURE — 93016 CV STRESS TEST SUPVJ ONLY: CPT | Performed by: INTERNAL MEDICINE

## 2020-08-06 PROCEDURE — 25010000002 REGADENOSON 0.4 MG/5ML SOLUTION: Performed by: NURSE PRACTITIONER

## 2020-08-06 PROCEDURE — 93306 TTE W/DOPPLER COMPLETE: CPT

## 2020-08-06 PROCEDURE — 78492 MYOCRD IMG PET MLT RST&STRS: CPT

## 2020-08-06 PROCEDURE — 93017 CV STRESS TEST TRACING ONLY: CPT

## 2020-08-06 PROCEDURE — A9555 RB82 RUBIDIUM: HCPCS | Performed by: NURSE PRACTITIONER

## 2020-08-06 PROCEDURE — 93306 TTE W/DOPPLER COMPLETE: CPT | Performed by: INTERNAL MEDICINE

## 2020-08-06 PROCEDURE — 93018 CV STRESS TEST I&R ONLY: CPT | Performed by: INTERNAL MEDICINE

## 2020-08-06 PROCEDURE — 78492 MYOCRD IMG PET MLT RST&STRS: CPT | Performed by: INTERNAL MEDICINE

## 2020-08-06 PROCEDURE — 0 RUBIDIUM CHLORIDE: Performed by: NURSE PRACTITIONER

## 2020-08-06 RX ADMIN — REGADENOSON 0.4 MG: 0.08 INJECTION, SOLUTION INTRAVENOUS at 09:07

## 2020-08-07 LAB
AORTIC ARCH: 2.7 CM
ASCENDING AORTA: 3.4 CM
BH CV ECHO MEAS - ACS: 2.1 CM
BH CV ECHO MEAS - AO MAX PG (FULL): 5.7 MMHG
BH CV ECHO MEAS - AO MAX PG: 8.6 MMHG
BH CV ECHO MEAS - AO MEAN PG (FULL): 3 MMHG
BH CV ECHO MEAS - AO MEAN PG: 5 MMHG
BH CV ECHO MEAS - AO ROOT AREA (BSA CORRECTED): 1.6
BH CV ECHO MEAS - AO ROOT AREA: 8.6 CM^2
BH CV ECHO MEAS - AO ROOT DIAM: 3.3 CM
BH CV ECHO MEAS - AO V2 MAX: 147 CM/SEC
BH CV ECHO MEAS - AO V2 MEAN: 97.6 CM/SEC
BH CV ECHO MEAS - AO V2 VTI: 38.4 CM
BH CV ECHO MEAS - ASC AORTA: 3.4 CM
BH CV ECHO MEAS - AVA(I,A): 1.6 CM^2
BH CV ECHO MEAS - AVA(I,D): 1.6 CM^2
BH CV ECHO MEAS - AVA(V,A): 1.5 CM^2
BH CV ECHO MEAS - AVA(V,D): 1.5 CM^2
BH CV ECHO MEAS - BSA(HAYCOCK): 2.2 M^2
BH CV ECHO MEAS - BSA: 2.1 M^2
BH CV ECHO MEAS - BZI_BMI: 39.1 KILOGRAMS/M^2
BH CV ECHO MEAS - BZI_METRIC_HEIGHT: 162.6 CM
BH CV ECHO MEAS - BZI_METRIC_WEIGHT: 103.4 KG
BH CV ECHO MEAS - EDV(MOD-SP2): 102 ML
BH CV ECHO MEAS - EDV(MOD-SP4): 96 ML
BH CV ECHO MEAS - EDV(TEICH): 102.4 ML
BH CV ECHO MEAS - EF(CUBED): 65.4 %
BH CV ECHO MEAS - EF(MOD-BP): 64 %
BH CV ECHO MEAS - EF(MOD-SP2): 64.7 %
BH CV ECHO MEAS - EF(MOD-SP4): 62.5 %
BH CV ECHO MEAS - EF(TEICH): 56.9 %
BH CV ECHO MEAS - ESV(MOD-SP2): 36 ML
BH CV ECHO MEAS - ESV(MOD-SP4): 36 ML
BH CV ECHO MEAS - ESV(TEICH): 44.1 ML
BH CV ECHO MEAS - FS: 29.8 %
BH CV ECHO MEAS - IVS/LVPW: 1
BH CV ECHO MEAS - IVSD: 1.2 CM
BH CV ECHO MEAS - LAT PEAK E' VEL: 6 CM/SEC
BH CV ECHO MEAS - LV DIASTOLIC VOL/BSA (35-75): 46.4 ML/M^2
BH CV ECHO MEAS - LV MASS(C)D: 212 GRAMS
BH CV ECHO MEAS - LV MASS(C)DI: 102.5 GRAMS/M^2
BH CV ECHO MEAS - LV MAX PG: 2.9 MMHG
BH CV ECHO MEAS - LV MEAN PG: 2 MMHG
BH CV ECHO MEAS - LV SYSTOLIC VOL/BSA (12-30): 17.4 ML/M^2
BH CV ECHO MEAS - LV V1 MAX: 85.5 CM/SEC
BH CV ECHO MEAS - LV V1 MEAN: 63.4 CM/SEC
BH CV ECHO MEAS - LV V1 VTI: 23.8 CM
BH CV ECHO MEAS - LVIDD: 4.7 CM
BH CV ECHO MEAS - LVIDS: 3.3 CM
BH CV ECHO MEAS - LVLD AP2: 7.8 CM
BH CV ECHO MEAS - LVLD AP4: 7.4 CM
BH CV ECHO MEAS - LVLS AP2: 6.6 CM
BH CV ECHO MEAS - LVLS AP4: 6.2 CM
BH CV ECHO MEAS - LVOT AREA (M): 2.5 CM^2
BH CV ECHO MEAS - LVOT AREA: 2.5 CM^2
BH CV ECHO MEAS - LVOT DIAM: 1.8 CM
BH CV ECHO MEAS - LVPWD: 1.2 CM
BH CV ECHO MEAS - MED PEAK E' VEL: 4.2 CM/SEC
BH CV ECHO MEAS - MR MAX PG: 131.3 MMHG
BH CV ECHO MEAS - MR MAX VEL: 573 CM/SEC
BH CV ECHO MEAS - MV A DUR: 0.15 SEC
BH CV ECHO MEAS - MV A MAX VEL: 106 CM/SEC
BH CV ECHO MEAS - MV DEC SLOPE: 225 CM/SEC^2
BH CV ECHO MEAS - MV DEC TIME: 0.27 SEC
BH CV ECHO MEAS - MV E MAX VEL: 79.3 CM/SEC
BH CV ECHO MEAS - MV E/A: 0.75
BH CV ECHO MEAS - MV MAX PG: 4.2 MMHG
BH CV ECHO MEAS - MV MEAN PG: 2 MMHG
BH CV ECHO MEAS - MV P1/2T MAX VEL: 91.3 CM/SEC
BH CV ECHO MEAS - MV P1/2T: 118.9 MSEC
BH CV ECHO MEAS - MV V2 MAX: 103 CM/SEC
BH CV ECHO MEAS - MV V2 MEAN: 65.4 CM/SEC
BH CV ECHO MEAS - MV V2 VTI: 32.8 CM
BH CV ECHO MEAS - MVA P1/2T LCG: 2.4 CM^2
BH CV ECHO MEAS - MVA(P1/2T): 1.9 CM^2
BH CV ECHO MEAS - MVA(VTI): 1.8 CM^2
BH CV ECHO MEAS - PA ACC TIME: 0.14 SEC
BH CV ECHO MEAS - PA MAX PG (FULL): 1.9 MMHG
BH CV ECHO MEAS - PA MAX PG: 4.4 MMHG
BH CV ECHO MEAS - PA PR(ACCEL): 13.8 MMHG
BH CV ECHO MEAS - PA V2 MAX: 105 CM/SEC
BH CV ECHO MEAS - PULM A REVS DUR: 0.12 SEC
BH CV ECHO MEAS - PULM A REVS VEL: 27.4 CM/SEC
BH CV ECHO MEAS - PULM DIAS VEL: 34.7 CM/SEC
BH CV ECHO MEAS - PULM S/D: 1.2
BH CV ECHO MEAS - PULM SYS VEL: 42.4 CM/SEC
BH CV ECHO MEAS - PVA(V,A): 2.1 CM^2
BH CV ECHO MEAS - PVA(V,D): 2.1 CM^2
BH CV ECHO MEAS - QP/QS: 1.1
BH CV ECHO MEAS - RAP SYSTOLE: 3 MMHG
BH CV ECHO MEAS - RV MAX PG: 2.5 MMHG
BH CV ECHO MEAS - RV MEAN PG: 1 MMHG
BH CV ECHO MEAS - RV V1 MAX: 79.6 CM/SEC
BH CV ECHO MEAS - RV V1 MEAN: 57.5 CM/SEC
BH CV ECHO MEAS - RV V1 VTI: 23 CM
BH CV ECHO MEAS - RVOT AREA: 2.8 CM^2
BH CV ECHO MEAS - RVOT DIAM: 1.9 CM
BH CV ECHO MEAS - RVSP: 27.4 MMHG
BH CV ECHO MEAS - SI(AO): 158.8 ML/M^2
BH CV ECHO MEAS - SI(CUBED): 32.8 ML/M^2
BH CV ECHO MEAS - SI(LVOT): 29.3 ML/M^2
BH CV ECHO MEAS - SI(MOD-SP2): 31.9 ML/M^2
BH CV ECHO MEAS - SI(MOD-SP4): 29 ML/M^2
BH CV ECHO MEAS - SI(TEICH): 28.2 ML/M^2
BH CV ECHO MEAS - SUP REN AO DIAM: 1.9 CM
BH CV ECHO MEAS - SV(AO): 328.4 ML
BH CV ECHO MEAS - SV(CUBED): 67.9 ML
BH CV ECHO MEAS - SV(LVOT): 60.6 ML
BH CV ECHO MEAS - SV(MOD-SP2): 66 ML
BH CV ECHO MEAS - SV(MOD-SP4): 60 ML
BH CV ECHO MEAS - SV(RVOT): 65.2 ML
BH CV ECHO MEAS - SV(TEICH): 58.2 ML
BH CV ECHO MEAS - TAPSE (>1.6): 2.6 CM
BH CV ECHO MEAS - TR MAX VEL: 247 CM/SEC
BH CV ECHO MEASUREMENTS AVERAGE E/E' RATIO: 15.55
BH CV XLRA - RV BASE: 3.3 CM
BH CV XLRA - RV LENGTH: 6.2 CM
BH CV XLRA - RV MID: 2.6 CM
BH CV XLRA - TDI S': 12.7 CM/SEC
LEFT ATRIUM VOLUME INDEX: 48 ML/M2
LV EF 2D ECHO EST: 65 %
MAXIMAL PREDICTED HEART RATE: 152 BPM
SINUS: 2.9 CM
STJ: 3.1 CM
STRESS TARGET HR: 129 BPM

## 2020-08-18 ENCOUNTER — APPOINTMENT (OUTPATIENT)
Dept: WOMENS IMAGING | Facility: HOSPITAL | Age: 70
End: 2020-08-18

## 2020-08-18 PROCEDURE — 77067 SCR MAMMO BI INCL CAD: CPT | Performed by: RADIOLOGY

## 2020-08-18 PROCEDURE — 77063 BREAST TOMOSYNTHESIS BI: CPT | Performed by: RADIOLOGY

## 2020-09-15 ENCOUNTER — HOSPITAL ENCOUNTER (OUTPATIENT)
Dept: CT IMAGING | Facility: HOSPITAL | Age: 70
Discharge: HOME | End: 2020-09-15
Admitting: INTERNAL MEDICINE

## 2020-09-15 DIAGNOSIS — R91.1 LUNG NODULE: ICD-10-CM

## 2020-09-15 PROCEDURE — 71250 CT THORAX DX C-: CPT

## 2021-02-08 RX ORDER — ESCITALOPRAM OXALATE 20 MG/1
TABLET ORAL
Qty: 90 TABLET | Refills: 1 | OUTPATIENT
Start: 2021-02-08

## 2021-03-19 ENCOUNTER — BULK ORDERING (OUTPATIENT)
Dept: CASE MANAGEMENT | Facility: OTHER | Age: 71
End: 2021-03-19

## 2021-03-19 DIAGNOSIS — Z23 IMMUNIZATION DUE: ICD-10-CM

## 2021-03-26 ENCOUNTER — TELEPHONE (OUTPATIENT)
Dept: FAMILY MEDICINE CLINIC | Facility: CLINIC | Age: 71
End: 2021-03-26

## 2021-03-26 RX ORDER — ESCITALOPRAM OXALATE 20 MG/1
20 TABLET ORAL DAILY
Qty: 30 TABLET | Refills: 0 | Status: SHIPPED | OUTPATIENT
Start: 2021-03-26 | End: 2021-04-27 | Stop reason: SDUPTHER

## 2021-03-26 NOTE — TELEPHONE ENCOUNTER
Patient called to get a temporary refill of Escitalopram. Scheduled next available with Dr Rangel for 04/27 @ 4.  Patient has been out of medication.  Refill can be sent to her local [address] AT [facility] [address] - [phone]  - [phone] FX    Please call back if there's any issues, or if she can be seen sooner.      [phone]

## 2021-04-27 ENCOUNTER — OFFICE VISIT (OUTPATIENT)
Dept: FAMILY MEDICINE CLINIC | Facility: CLINIC | Age: 71
End: 2021-04-27

## 2021-04-27 VITALS
TEMPERATURE: 97.3 F | SYSTOLIC BLOOD PRESSURE: 118 MMHG | OXYGEN SATURATION: 98 % | RESPIRATION RATE: 16 BRPM | WEIGHT: 207.3 LBS | HEART RATE: 67 BPM | HEIGHT: 64 IN | BODY MASS INDEX: 35.39 KG/M2 | DIASTOLIC BLOOD PRESSURE: 62 MMHG

## 2021-04-27 DIAGNOSIS — Z00.00 MEDICARE ANNUAL WELLNESS VISIT, SUBSEQUENT: Primary | ICD-10-CM

## 2021-04-27 PROBLEM — E05.00 GRAVES' DISEASE: Status: ACTIVE | Noted: 2021-04-27

## 2021-04-27 PROBLEM — N28.9 KIDNEY DISORDER: Status: ACTIVE | Noted: 2021-04-27

## 2021-04-27 PROBLEM — J18.9 HEALTHCARE-ASSOCIATED PNEUMONIA: Status: ACTIVE | Noted: 2021-04-27

## 2021-04-27 PROCEDURE — 1160F RVW MEDS BY RX/DR IN RCRD: CPT | Performed by: FAMILY MEDICINE

## 2021-04-27 PROCEDURE — G0439 PPPS, SUBSEQ VISIT: HCPCS | Performed by: FAMILY MEDICINE

## 2021-04-27 PROCEDURE — 1170F FXNL STATUS ASSESSED: CPT | Performed by: FAMILY MEDICINE

## 2021-04-27 RX ORDER — ESCITALOPRAM OXALATE 20 MG/1
20 TABLET ORAL DAILY
Qty: 90 TABLET | Refills: 3 | Status: SHIPPED | OUTPATIENT
Start: 2021-04-27 | End: 2022-03-30

## 2021-04-27 RX ORDER — LANCETS
EACH MISCELLANEOUS
COMMUNITY
Start: 2021-04-10

## 2021-04-27 RX ORDER — NYSTATIN 100000 [USP'U]/G
POWDER TOPICAL AS NEEDED
COMMUNITY
Start: 2021-02-07

## 2021-08-19 ENCOUNTER — APPOINTMENT (OUTPATIENT)
Dept: WOMENS IMAGING | Facility: HOSPITAL | Age: 71
End: 2021-08-19

## 2021-08-19 PROCEDURE — 77063 BREAST TOMOSYNTHESIS BI: CPT | Performed by: RADIOLOGY

## 2021-08-19 PROCEDURE — 77067 SCR MAMMO BI INCL CAD: CPT | Performed by: RADIOLOGY

## 2021-11-04 ENCOUNTER — OFFICE VISIT (OUTPATIENT)
Dept: FAMILY MEDICINE CLINIC | Facility: CLINIC | Age: 71
End: 2021-11-04

## 2021-11-04 ENCOUNTER — LAB (OUTPATIENT)
Dept: LAB | Facility: HOSPITAL | Age: 71
End: 2021-11-04

## 2021-11-04 VITALS
HEIGHT: 64 IN | HEART RATE: 68 BPM | BODY MASS INDEX: 34.83 KG/M2 | SYSTOLIC BLOOD PRESSURE: 103 MMHG | OXYGEN SATURATION: 97 % | TEMPERATURE: 96.8 F | RESPIRATION RATE: 12 BRPM | WEIGHT: 204 LBS | DIASTOLIC BLOOD PRESSURE: 65 MMHG

## 2021-11-04 DIAGNOSIS — R10.13 DYSPEPSIA: Primary | ICD-10-CM

## 2021-11-04 DIAGNOSIS — R19.5 LOOSE STOOLS: ICD-10-CM

## 2021-11-04 DIAGNOSIS — R10.13 EPIGASTRIC PAIN: ICD-10-CM

## 2021-11-04 LAB
ALBUMIN SERPL-MCNC: 4 G/DL (ref 3.5–5.2)
ALBUMIN/GLOB SERPL: 1.1 G/DL
ALP SERPL-CCNC: 56 U/L (ref 39–117)
ALT SERPL W P-5'-P-CCNC: 8 U/L (ref 1–33)
AMYLASE SERPL-CCNC: 143 U/L (ref 28–100)
ANION GAP SERPL CALCULATED.3IONS-SCNC: 8.4 MMOL/L (ref 5–15)
AST SERPL-CCNC: 20 U/L (ref 1–32)
BASOPHILS # BLD AUTO: 0.02 10*3/MM3 (ref 0–0.2)
BASOPHILS NFR BLD AUTO: 0.5 % (ref 0–1.5)
BILIRUB SERPL-MCNC: 0.6 MG/DL (ref 0–1.2)
BUN SERPL-MCNC: 15 MG/DL (ref 8–23)
BUN/CREAT SERPL: 15.3 (ref 7–25)
CALCIUM SPEC-SCNC: 9.6 MG/DL (ref 8.6–10.5)
CHLORIDE SERPL-SCNC: 106 MMOL/L (ref 98–107)
CO2 SERPL-SCNC: 28.6 MMOL/L (ref 22–29)
CREAT SERPL-MCNC: 0.98 MG/DL (ref 0.57–1)
DEPRECATED RDW RBC AUTO: 52.1 FL (ref 37–54)
EOSINOPHIL # BLD AUTO: 0.12 10*3/MM3 (ref 0–0.4)
EOSINOPHIL NFR BLD AUTO: 2.7 % (ref 0.3–6.2)
ERYTHROCYTE [DISTWIDTH] IN BLOOD BY AUTOMATED COUNT: 14.5 % (ref 12.3–15.4)
GFR SERPL CREATININE-BSD FRML MDRD: 68 ML/MIN/1.73
GLOBULIN UR ELPH-MCNC: 3.6 GM/DL
GLUCOSE SERPL-MCNC: 73 MG/DL (ref 65–99)
HCT VFR BLD AUTO: 36.8 % (ref 34–46.6)
HGB BLD-MCNC: 11.8 G/DL (ref 12–15.9)
IMM GRANULOCYTES # BLD AUTO: 0.02 10*3/MM3 (ref 0–0.05)
IMM GRANULOCYTES NFR BLD AUTO: 0.5 % (ref 0–0.5)
LIPASE SERPL-CCNC: 127 U/L (ref 13–60)
LYMPHOCYTES # BLD AUTO: 0.97 10*3/MM3 (ref 0.7–3.1)
LYMPHOCYTES NFR BLD AUTO: 21.9 % (ref 19.6–45.3)
MCH RBC QN AUTO: 32.1 PG (ref 26.6–33)
MCHC RBC AUTO-ENTMCNC: 32.1 G/DL (ref 31.5–35.7)
MCV RBC AUTO: 100 FL (ref 79–97)
MONOCYTES # BLD AUTO: 0.36 10*3/MM3 (ref 0.1–0.9)
MONOCYTES NFR BLD AUTO: 8.1 % (ref 5–12)
NEUTROPHILS NFR BLD AUTO: 2.93 10*3/MM3 (ref 1.7–7)
NEUTROPHILS NFR BLD AUTO: 66.3 % (ref 42.7–76)
NRBC BLD AUTO-RTO: 0.5 /100 WBC (ref 0–0.2)
PLATELET # BLD AUTO: 214 10*3/MM3 (ref 140–450)
PMV BLD AUTO: 8.9 FL (ref 6–12)
POTASSIUM SERPL-SCNC: 4.1 MMOL/L (ref 3.5–5.2)
PROT SERPL-MCNC: 7.6 G/DL (ref 6–8.5)
RBC # BLD AUTO: 3.68 10*6/MM3 (ref 3.77–5.28)
SODIUM SERPL-SCNC: 143 MMOL/L (ref 136–145)
WBC # BLD AUTO: 4.42 10*3/MM3 (ref 3.4–10.8)

## 2021-11-04 PROCEDURE — 85025 COMPLETE CBC W/AUTO DIFF WBC: CPT | Performed by: NURSE PRACTITIONER

## 2021-11-04 PROCEDURE — 80053 COMPREHEN METABOLIC PANEL: CPT | Performed by: NURSE PRACTITIONER

## 2021-11-04 PROCEDURE — 99214 OFFICE O/P EST MOD 30 MIN: CPT | Performed by: NURSE PRACTITIONER

## 2021-11-04 PROCEDURE — 36415 COLL VENOUS BLD VENIPUNCTURE: CPT | Performed by: NURSE PRACTITIONER

## 2021-11-04 PROCEDURE — 82150 ASSAY OF AMYLASE: CPT | Performed by: NURSE PRACTITIONER

## 2021-11-04 PROCEDURE — 83690 ASSAY OF LIPASE: CPT | Performed by: NURSE PRACTITIONER

## 2021-11-04 RX ORDER — OLMESARTAN MEDOXOMIL 5 MG/1
1 TABLET ORAL DAILY
COMMUNITY
Start: 2021-07-24

## 2021-11-04 RX ORDER — AMLODIPINE BESYLATE 10 MG/1
10 TABLET ORAL DAILY
COMMUNITY
Start: 2021-08-05 | End: 2022-02-01

## 2021-11-04 RX ORDER — SUCRALFATE 1 G/1
1 TABLET ORAL 4 TIMES DAILY
Qty: 60 TABLET | Refills: 1 | Status: SHIPPED | OUTPATIENT
Start: 2021-11-04

## 2021-11-04 RX ORDER — METFORMIN HYDROCHLORIDE 500 MG/1
500 TABLET, EXTENDED RELEASE ORAL
COMMUNITY
Start: 2021-09-13

## 2021-11-04 RX ORDER — BLOOD SUGAR DIAGNOSTIC
1 STRIP MISCELLANEOUS
COMMUNITY
Start: 2021-10-25 | End: 2022-01-23

## 2021-11-04 RX ORDER — FAMOTIDINE 40 MG/1
40 TABLET, FILM COATED ORAL 2 TIMES DAILY
Qty: 60 TABLET | Refills: 1 | Status: SHIPPED | OUTPATIENT
Start: 2021-11-04 | End: 2022-04-11 | Stop reason: ALTCHOICE

## 2021-11-04 NOTE — PROGRESS NOTES
Very pleasant patient here today complains of epigastric discomfort upper abdominal cramping more so after meals can occur anytime,  No radiation  She does have some loose stools pudding-like consistency brown once a day no severe diarrhea no severe abdominal pain  No fever chills body aches lethargy weakness no cough congestion no loss of taste or smell no sore throat.  She is fully vaccinated for Covid  No recent travel no recurrent symptoms.  She has had gallbladder surgery in the past no longer has her gallbladder.  No NSAID abuse  She does take autoimmune modulators  And she is doing well here,            Physical exam, patient is pleasant alert and oriented no distress appears well  Abdomen soft nondistended  Mild to moderate epigastric tenderness otherwise completely normal exam no lower abdominal tenderness  Lower upper quadrant no guarding no rebound no ascites.

## 2021-11-04 NOTE — PATIENT INSTRUCTIONS
Discharge instructions  Push fluids plenty of electrolytes  Bananas rice applesauce toast avoid spicy greasy foods smaller meals more frequent rather than large meals  Generic Carafate  4 times a day before dinner and at bedtime with a glass of water  Continue Nexium for now Pepcid for the next couple weeks    If severe pain fever chills vomiting weakness  Worsening symptoms emergency room immediately    Follow-up in 1 week otherwise

## 2021-11-05 ENCOUNTER — LAB (OUTPATIENT)
Dept: LAB | Facility: HOSPITAL | Age: 71
End: 2021-11-05

## 2021-11-05 LAB
LABCORP SARS-COV-2, NAA 2 DAY TAT: NORMAL
SARS-COV-2 RNA RESP QL NAA+PROBE: NOT DETECTED

## 2021-11-05 PROCEDURE — 87045 FECES CULTURE AEROBIC BACT: CPT | Performed by: NURSE PRACTITIONER

## 2021-11-05 PROCEDURE — 87046 STOOL CULTR AEROBIC BACT EA: CPT | Performed by: NURSE PRACTITIONER

## 2021-11-05 PROCEDURE — 87493 C DIFF AMPLIFIED PROBE: CPT | Performed by: NURSE PRACTITIONER

## 2021-11-05 PROCEDURE — 87338 HPYLORI STOOL AG IA: CPT | Performed by: NURSE PRACTITIONER

## 2021-11-06 LAB — C DIFF TOX GENS STL QL NAA+PROBE: NEGATIVE

## 2021-11-08 LAB
BACTERIA SPEC CULT: NORMAL
YERSINIA SPEC CULT: NORMAL

## 2021-11-09 LAB — H PYLORI AG STL QL IA: NEGATIVE

## 2021-11-09 NOTE — PROGRESS NOTES
"Chief Complaint  Nausea (pt states been going on for about week and a hlaf) and Pain (abdomen)    Subjective          Diana Gee presents to [facility]  Very pleasant patient here today complains of epigastric discomfort upper abdominal cramping more so after meals can occur anytime,  No radiation  She does have some loose stools pudding-like consistency brown once a day no severe diarrhea no severe abdominal pain  No fever chills body aches lethargy weakness no cough congestion no loss of taste or smell no sore throat.  She is fully vaccinated for Covid  No recent travel no recurrent symptoms.  She has had gallbladder surgery in the past no longer has her gallbladder.  No NSAID abuse  She does take autoimmune modulators  And she is doing well here,                    Nausea  Associated symptoms include nausea.   Pain  Associated symptoms include nausea.       Objective   Vital Signs:   /65   Pulse 68   Temp 96.8 °F (36 °C) (Infrared)   Resp 12   Ht 162.6 cm (64\")   Wt 92.5 kg (204 lb)   SpO2 97%   BMI 35.02 kg/m²     Physical Exam  Vitals reviewed.   Constitutional:       Appearance: She is well-developed.      Comments: patient is pleasant alert and oriented no distress appears well  .   HENT:      Head: Normocephalic.      Nose: Nose normal.   Eyes:      General: No scleral icterus.     Conjunctiva/sclera: Conjunctivae normal.      Pupils: Pupils are equal, round, and reactive to light.   Neck:      Thyroid: No thyromegaly.      Vascular: No JVD.   Cardiovascular:      Rate and Rhythm: Normal rate and regular rhythm.      Heart sounds: Normal heart sounds. No murmur heard.  No friction rub. No gallop.    Pulmonary:      Effort: Pulmonary effort is normal. No respiratory distress.      Breath sounds: Normal breath sounds. No stridor. No wheezing or rales.   Abdominal:      General: Bowel sounds are normal. There is no distension.      Palpations: Abdomen is soft. "      Tenderness: There is no abdominal tenderness.      Comments: No hepatosplenomegaly, no ascites,  Abdomen soft nondistended  Mild to moderate epigastric tenderness otherwise completely normal exam no lower abdominal tenderness  Lower upper quadrant no guarding no rebound no ascites   Musculoskeletal:         General: No tenderness.      Cervical back: Neck supple.   Lymphadenopathy:      Cervical: No cervical adenopathy.   Skin:     General: Skin is warm and dry.      Findings: No erythema or rash.   Neurological:      Mental Status: She is alert and oriented to person, place, and time.      Deep Tendon Reflexes: Reflexes are normal and symmetric.   Psychiatric:         Behavior: Behavior normal.         Thought Content: Thought content normal.         Judgment: Judgment normal.        Result Review :                 Assessment and Plan    Diagnoses and all orders for this visit:    1. Dyspepsia (Primary)  -     CBC & Differential  -     Comprehensive Metabolic Panel  -     H. Pylori Antigen, Stool - Stool, Per Rectum  -     Clostridium Difficile Toxin, PCR - Stool, Per Rectum  -     Stool Culture With Yersinia - Stool, Per Rectum  -     Amylase  -     Lipase  -     COVID-19,[facility] ROUTINE, NP/OP SWAB IN TRANSPORT MEDIA OR ESWAB 72 HR TAT - Swab, Nasopharynx    2. Epigastric pain  -     CBC & Differential  -     Comprehensive Metabolic Panel  -     H. Pylori Antigen, Stool - Stool, Per Rectum  -     Clostridium Difficile Toxin, PCR - Stool, Per Rectum  -     Stool Culture With Yersinia - Stool, Per Rectum  -     Amylase  -     Lipase  -     COVID-19,[facility] ROUTINE, NP/OP SWAB IN TRANSPORT MEDIA OR ESWAB 72 HR TAT - Swab, Nasopharynx    3. Loose stools  -     CBC & Differential  -     Comprehensive Metabolic Panel  -     H. Pylori Antigen, Stool - Stool, Per Rectum  -     Clostridium Difficile Toxin, PCR - Stool, Per Rectum  -     Stool Culture With Yersinia - Stool, Per Rectum  -     Amylase  -     Lipase  -      COVID-19,[facility] ROUTINE, NP/OP SWAB IN TRANSPORT MEDIA OR ESWAB 72 HR TAT - Swab, Nasopharynx    Other orders  -     sucralfate (Carafate) 1 g tablet; Take 1 tablet by mouth 4 (Four) Times a Day. Before meals and at bedtime  Dispense: 60 tablet; Refill: 1  -     famotidine (Pepcid) 40 MG tablet; Take 1 tablet by mouth 2 (Two) Times a Day.  Dispense: 60 tablet; Refill: 1  -     SARS-CoV-2, LORIN 2 DAY TAT - ,        Follow Up   Return in about 1 week (around 11/11/2021), or Labs today recheck 1 week with myself thank you.  Patient was given instructions and counseling regarding her condition or for health maintenance advice. Please see specific information pulled into the AVS if appropriate.     Discharge instructions  Push fluids plenty of electrolytes  Bananas rice applesauce toast avoid spicy greasy foods smaller meals more frequent rather than large meals  Generic Carafate  4 times a day before dinner and at bedtime with a glass of water  Continue Nexium for now Pepcid for the next couple weeks    If severe pain fever chills vomiting weakness  Worsening symptoms emergency room immediately    Follow-up in 1 week otherwise

## 2021-11-11 ENCOUNTER — OFFICE VISIT (OUTPATIENT)
Dept: FAMILY MEDICINE CLINIC | Facility: CLINIC | Age: 71
End: 2021-11-11

## 2021-11-11 VITALS
DIASTOLIC BLOOD PRESSURE: 70 MMHG | RESPIRATION RATE: 12 BRPM | OXYGEN SATURATION: 94 % | HEART RATE: 68 BPM | HEIGHT: 64 IN | WEIGHT: 208 LBS | TEMPERATURE: 97.5 F | SYSTOLIC BLOOD PRESSURE: 122 MMHG | BODY MASS INDEX: 35.51 KG/M2

## 2021-11-11 DIAGNOSIS — K29.00 OTHER ACUTE GASTRITIS WITHOUT HEMORRHAGE: Primary | ICD-10-CM

## 2021-11-11 PROCEDURE — 99213 OFFICE O/P EST LOW 20 MIN: CPT | Performed by: NURSE PRACTITIONER

## 2021-11-11 NOTE — PROGRESS NOTES
"Chief Complaint  Follow-up (1 week f/u)    Subjective          Diana Gee presents to [facility]  Follow-up recent dyspepsia some epigastric discomfort, decreased appetite and some diarrhea she was seen 1 week ago given some Carafate and added some Pepcid, and have her on a bland diet for a few days  Her stool studies were negative, negative C. difficile negative culture,  H. pylori negative  Liver kidney function CBC all stable amylase lipase mildly elevated  She has no history of pancreatitis no alcohol abuse she feels much better symptoms have resolved  She said no recurrent symptoms since her gallbladder has already been removed    No fever no chills no chest pain good appetite no abdominal complaints      Objective   Vital Signs:   /70   Pulse 68   Temp 97.5 °F (36.4 °C) (Infrared)   Resp 12   Ht 162.6 cm (64\")   Wt 94.3 kg (208 lb)   SpO2 94%   BMI 35.70 kg/m²     Physical Exam  Vitals reviewed.   Constitutional:       Appearance: Normal appearance. She is well-developed. She is not ill-appearing.      Comments: Very pleasant appears well   HENT:      Head: Normocephalic.      Nose: Nose normal.   Eyes:      General: No scleral icterus.     Conjunctiva/sclera: Conjunctivae normal.      Pupils: Pupils are equal, round, and reactive to light.   Neck:      Thyroid: No thyromegaly.      Vascular: No JVD.   Cardiovascular:      Rate and Rhythm: Normal rate and regular rhythm.      Heart sounds: Normal heart sounds. No murmur heard.  No friction rub. No gallop.    Pulmonary:      Effort: Pulmonary effort is normal. No respiratory distress.      Breath sounds: Normal breath sounds. No stridor. No wheezing or rales.   Abdominal:      General: Bowel sounds are normal. There is no distension.      Palpations: Abdomen is soft.      Tenderness: There is no abdominal tenderness.      Comments: No hepatosplenomegaly, no ascites,   Musculoskeletal:         General: No " tenderness.      Cervical back: Neck supple.   Lymphadenopathy:      Cervical: No cervical adenopathy.   Skin:     General: Skin is warm and dry.      Findings: No erythema or rash.   Neurological:      Mental Status: She is alert and oriented to person, place, and time.      Deep Tendon Reflexes: Reflexes are normal and symmetric.   Psychiatric:         Behavior: Behavior normal.         Thought Content: Thought content normal.         Judgment: Judgment normal.        Result Review :                 Assessment and Plan    Diagnoses and all orders for this visit:    1. Other acute gastritis without hemorrhage (Primary)        Follow Up   No follow-ups on file.  Patient was given instructions and counseling regarding her condition or for health maintenance advice. Please see specific information pulled into the AVS if appropriate.     Symptoms have resolved  Labs were stable although amylase lipase mildly elevated  Should she have recurrent symptoms she will need a EGD as well she should have further evaluation including an ultrasound of her upper abdomen or CT abdomen and further pancreatic assessment    Her enzymes are mildly elevated as long as she is without further symptoms no further action warranted.  She will follow up with Dr. Rangel          Discharge instructions finish the generic Carafate  Discontinue Pepcid  The next 2 to 4 weeks    Plenty of fluids, avoid ibuprofen naproxen etc. for now,    Decrease spicy greasy foods   caution with fatty foods now as well your pancreatic enzymes were mildly elevated    Should you have recurrent symptoms you will need to see gastroenterology for an EGD and scope    Any severe complaints fever chills nausea vomiting weakness emergency room    Follow-up Dr. Rangel for routine care

## 2021-11-11 NOTE — PATIENT INSTRUCTIONS
Discharge instructions finish the generic Carafate  Discontinue Pepcid  The next 2 to 4 weeks    Plenty of fluids, avoid ibuprofen naproxen etc. for now,    Decrease spicy greasy foods   caution with fatty foods now as well your pancreatic enzymes were mildly elevated    Should you have recurrent symptoms you will need to see gastroenterology for an EGD and scope    Any severe complaints fever chills nausea vomiting weakness emergency room    Follow-up Dr. Rangel for routine care

## 2022-03-30 RX ORDER — ESCITALOPRAM OXALATE 20 MG/1
TABLET ORAL
Qty: 90 TABLET | Refills: 3 | Status: SHIPPED | OUTPATIENT
Start: 2022-03-30 | End: 2023-03-28

## 2022-03-30 NOTE — TELEPHONE ENCOUNTER
Rx Refill Note  Requested Prescriptions     Pending Prescriptions Disp Refills   • escitalopram (LEXAPRO) 20 MG tablet [Pharmacy Med Name: ESCITALOPRAM TAB 20MG] 90 tablet 3     Sig: TAKE 1 TABLET DAILY      Last office visit with prescribing clinician: 4/27/2021      Next office visit with prescribing clinician: 4/29/2022            Jeremias Cook MA  03/30/22, 08:22 EDT

## 2022-04-11 ENCOUNTER — OFFICE VISIT (OUTPATIENT)
Dept: CARDIOLOGY | Facility: CLINIC | Age: 72
End: 2022-04-11

## 2022-04-11 VITALS
SYSTOLIC BLOOD PRESSURE: 116 MMHG | BODY MASS INDEX: 32.44 KG/M2 | DIASTOLIC BLOOD PRESSURE: 72 MMHG | HEART RATE: 65 BPM | WEIGHT: 190 LBS | HEIGHT: 64 IN

## 2022-04-11 DIAGNOSIS — I51.89 DIASTOLIC DYSFUNCTION: ICD-10-CM

## 2022-04-11 DIAGNOSIS — Z94.0 RENAL TRANSPLANT RECIPIENT: ICD-10-CM

## 2022-04-11 DIAGNOSIS — I34.0 NONRHEUMATIC MITRAL VALVE REGURGITATION: ICD-10-CM

## 2022-04-11 DIAGNOSIS — I25.10 NONOBSTRUCTIVE ATHEROSCLEROSIS OF CORONARY ARTERY: Primary | ICD-10-CM

## 2022-04-11 DIAGNOSIS — I10 ESSENTIAL HYPERTENSION: ICD-10-CM

## 2022-04-11 DIAGNOSIS — R63.4 WEIGHT LOSS: ICD-10-CM

## 2022-04-11 PROBLEM — N28.9 KIDNEY DISORDER: Status: RESOLVED | Noted: 2021-04-27 | Resolved: 2022-04-11

## 2022-04-11 PROBLEM — R19.7 DIARRHEA: Status: RESOLVED | Noted: 2019-05-30 | Resolved: 2022-04-11

## 2022-04-11 PROBLEM — J18.9 HEALTHCARE-ASSOCIATED PNEUMONIA: Status: RESOLVED | Noted: 2021-04-27 | Resolved: 2022-04-11

## 2022-04-11 PROCEDURE — 99214 OFFICE O/P EST MOD 30 MIN: CPT | Performed by: NURSE PRACTITIONER

## 2022-04-11 PROCEDURE — 93000 ELECTROCARDIOGRAM COMPLETE: CPT | Performed by: NURSE PRACTITIONER

## 2022-04-11 NOTE — PROGRESS NOTES
Date of Office Visit: 2022  Encounter Provider: ROMARIO Xiao  Place of Service: [facility] CARDIOLOGY  Patient Name: Diana Gee  :1950  Primary Cardiologist: Dr. Ragini Capellan     Chief Complaint   Patient presents with   • Shortness of Breath   • Follow-up   :     HPI: Diana Gee is a pleasant 72 y.o. female who presents today for follow-up on nonobstructive coronary artery disease. I have reviewed her medical records.    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  She underwent kidney transplantation.  Subsequently she was also found to have cancer of her kidney and the transplant failed.  in , she had a repeat transplant.     She has a known history of type 2 diabetes mellitus, hepatitis C, DVT (), hypertension, hyperlipidemia, and rheumatic fever as a child.  She has a pulmonary nodule followed by Dr. Magdiel Nye.      In May 2011, she had a heart catheterization which revealed mild nonobstructive coronary artery disease and normal filling pressures.       In 2015, she had a normal cardiac PET scan.  On 2015 echocardiogram showed the following: EF 55-60%, grade 2 diastolic dysfunction, left atrium volume severely increased, right atrium mildly dilated, moderate mitral annular calcification with trace to mild mitral regurgitation, aortic valve sclerosis, and no LVOT obstruction.    In 2020, I saw her in the office and she was experiencing midsternal chest tightness and dyspnea.  2D echocardiogram showed LVEF of 64%, grade 1A diastolic dysfunction, left atrial volume moderately increased, aortic valve sclerosis, and mild mitral valve regurgitation.  Stress test was normal.  She has had numerous appointments since that have been canceled by the patient.    I reviewed her recent blood work from 2022: CMP normal except for total protein of 7.6 and chloride of  109.  Magnesium and phosphorus normal.  Hemoglobin 11.2, hematocrit 34.4, RBC 3.6.    Today is her follow-up visit.  We reviewed her last test results and she verbalizes understanding.  She is no longer experiencing shortness of breath and thinks that it is due to to weight loss.  She has lost 38 pounds since her last visit with us in 2020.  She denies chest pain, palpitations, edema, dizziness, syncope, or bleeding.      Past Medical History:   Diagnosis Date   • Abnormal glucose    • Acute bronchitis    • Acute upper respiratory infection    • Anemia    • C. difficile diarrhea 2008    [facility]   • Carrier of viral hepatitis C (HCC)     Declared viral free in Sept '14  Dr Julio Serna  [facility]   • Cellulitis of foot    • Cerumen impaction    • Charcot's joint    • Depression with anxiety    • Diabetes mellitus (HCC)    • Diastolic dysfunction    • Disease of thyroid gland    • Failed kidney transplant    • Gastric ulcer    • GERD (gastroesophageal reflux disease)    • Gout    • Graves disease    • Healthcare-associated pneumonia 4/27/2021   • Heart murmur    • Hepatitis C    • Hyperlipidemia    • Hypertension    • Hypothyroidism    • Insomnia    • Kidney carcinoma (HCC)    • Kidney failure    • MR (mitral regurgitation)     moderate, with cunningham dysfctn, severe LAE, mod OLENA, aortic thickening without insuff- Dr Horn   • Neuropathy    • Osteomyelitis of toe (HCC)    • Osteoporosis    • Peripheral edema    • Pes planus    • Subconjunctival hemorrhage    • Transplant recipient 1995, 2011    BILATERAL KIDNEYS   • Type 2 diabetes mellitus with diabetic mononeuropathy (HCC)        Past Surgical History:   Procedure Laterality Date   • AMPUTATION DIGIT Left 6/9/2016    Procedure: LT 4TH TOE AMPUTATION;  Surgeon: Navjot Gandara MD;  Location: [facility];  Service:    • AMPUTATION DIGIT Left 12/15/2016    Procedure: LT THIRD TOE AMPUTATION;  Surgeon: Navjot Gandara MD;  Location: [facility] OR;   Service:    • ARTERIOVENOUS FISTULA Bilateral -LEFT ARM, 2008-RIGHT ARM   • CHOLECYSTECTOMY     • COLONOSCOPY N/A 2019    Procedure: COLONOSCOPY WITHOUT PREP WITH BIOPSIES;  Surgeon: Cam Shin MD;  Location: [facility] ENDOSCOPY;  Service: Gastroenterology   • EYE SURGERY Bilateral ,     CATARACTS   • JOINT REPLACEMENT Left     SHOULDER   • NEPHRECTOMY Right     CANCER   • SKIN GRAFT Right 2012    LEG   • TOE AMPUTATION Left     FIFTH TOE   • TONSILLECTOMY  1964   • TRANSPLANTATION RENAL Left 1995-RIGHT KIDNEY TRANSPLANT   • TUBAL ABDOMINAL LIGATION         Social History     Socioeconomic History   • Marital status:      Spouse name: Calvin   Tobacco Use   • Smoking status: Former Smoker     Packs/day: 0.50     Years: 4.00     Pack years: 2.00     Quit date:      Years since quittin.2   • Smokeless tobacco: Never Used   Substance and Sexual Activity   • Alcohol use: No     Comment: caffeine use: 1 cup 3 times wkly   • Drug use: No   • Sexual activity: Defer       Family History   Problem Relation Age of Onset   • Hypothyroidism Mother    • COPD Mother    • COPD Father        The following portion of the patient's history were reviewed and updated as appropriate: past medical history, past surgical history, past social history, past family history, allergies, current medications, and problem list.    Review of Systems   Constitutional: Positive for weight loss.   Cardiovascular: Negative.    Respiratory: Negative.    Hematologic/Lymphatic: Negative.    Neurological: Negative.        Allergies   Allergen Reactions   • Heparin Hives and Myalgia     Tremors,fever   • Levofloxacin Hives and Anaphylaxis     Throat swelling no treatment given         Current Outpatient Medications:   •  Accu-Chek FastClix Lancets misc, , Disp: , Rfl:   •  aspirin 81 MG EC tablet, Take 81 mg by mouth Daily., Disp: , Rfl:   •  azaTHIOprine (IMURAN) 50 MG tablet, Take 50 mg by  mouth Daily. 3 tablets once daily, Disp: , Rfl:   •  carvedilol (COREG) 25 MG tablet, Take 25 mg by mouth 2 (Two) Times a Day With Meals., Disp: , Rfl:   •  Cholecalciferol (VITAMIN D3) 5000 units tablet tablet, Take 5,000 Units by mouth Daily., Disp: , Rfl:   •  escitalopram (LEXAPRO) 20 MG tablet, TAKE 1 TABLET DAILY, Disp: 90 tablet, Rfl: 3  •  esomeprazole (nexIUM) 20 MG capsule, Take 20 mg by mouth Every Morning Before Breakfast., Disp: , Rfl:   •  insulin detemir (LEVEMIR) 100 UNIT/ML injection, Inject 10 Units under the skin into the appropriate area as directed Every Night., Disp: , Rfl:   •  insulin lispro (humaLOG) 100 UNIT/ML injection, Inject 4 Units under the skin into the appropriate area as directed Every Morning., Disp: , Rfl:   •  levothyroxine (SYNTHROID, LEVOTHROID) 125 MCG tablet, Take 132 mcg by mouth Daily., Disp: , Rfl:   •  magnesium oxide (MAG-OX) 400 MG tablet, Take 800 mg by mouth 2 (Two) Times a Day., Disp: , Rfl:   •  metFORMIN ER (GLUCOPHAGE-XR) 500 MG 24 hr tablet, TAKE 2 TABLETS ONCE DAILY  WITH DINNER. DO NOT CRUSH, CHEW, OR SPLIT, Disp: , Rfl:   •  Multiple Vitamins-Minerals (MULTIVITAMIN WITH MINERALS) tablet tablet, Take 1 tablet by mouth Daily., Disp: , Rfl:   •  nystatin 097647 UNIT/GM topical powder, As Needed., Disp: , Rfl:   •  olmesartan (BENICAR) 5 MG tablet, Take 1 tablet by mouth Daily., Disp: , Rfl:   •  predniSONE (DELTASONE) 10 MG tablet, Take 2.5 mg by mouth Daily., Disp: , Rfl:   •  rosuvastatin (CRESTOR) 40 MG tablet, Take 40 mg by mouth Daily., Disp: , Rfl:   •  sucralfate (Carafate) 1 g tablet, Take 1 tablet by mouth 4 (Four) Times a Day. Before meals and at bedtime (Patient taking differently: Take 1 g by mouth Daily As Needed. Before meals and at bedtime), Disp: 60 tablet, Rfl: 1  •  tacrolimus (PROGRAF) 1 MG capsule, Take 1 mg by mouth 2 (Two) Times a Day., Disp: , Rfl:         Objective:     Vitals:    04/11/22 1441 04/11/22 1452   BP: 116/68 116/72   BP  "Location: Left arm Right arm   Pulse: 65    Weight: 86.2 kg (190 lb)    Height: 162.6 cm (64\")      Body mass index is 32.61 kg/m².    PHYSICAL EXAM:    Vitals Reviewed.   General Appearance: No acute distress, well developed and well nourished. Obese.   Eyes: Conjunctiva and lids: No erythema, swelling, or discharge. Sclera non-icteric. Glasses.   HENT: Atraumatic, normocephalic. External eyes, ears, and nose normal. No hearing loss noted. Mucous membranes normal. Lips not cyanotic. Neck supple with no tenderness. Wearing mask.   Respiratory: No signs of respiratory distress. Respiration rhythm and depth normal.   Clear to auscultation. No rales, crackles, rhonchi, or wheezing auscultated.   Cardiovascular:  Jugular Venous Pressure: Normal  Heart Rate and Rhythm: Normal, Heart Sounds: Normal S1 and S2. No S3 or S4 noted.  Murmurs: No murmurs noted. No rubs, thrills, or gallops.   Lower Extremities: No edema noted.  Gastrointestinal:  Abdomen soft, non-distended, non-tender.    Musculoskeletal: Normal movement of extremities.  Skin and Nails: General appearance normal. No pallor, cyanosis, diaphoresis. Skin temperature normal. No clubbing of fingernails.   Psychiatric: Patient alert and oriented to person, place, and time. Speech and behavior appropriate. Normal mood and affect.       ECG 12 Lead    Date/Time: 4/11/2022 2:53 PM  Performed by: Tawanna Schwarz APRN  Authorized by: Tawanna Schwarz APRN   Comparison: compared with previous ECG from 7/21/2020  Similar to previous ECG  Rhythm: sinus rhythm  Rate: normal  BPM: 65  Q waves: II, aVF and III    ST Segments: ST segments normal  QRS axis: normal  Other findings: non-specific ST-T wave changes    Clinical impression: non-specific ECG              Assessment:       Diagnosis Plan   1. Nonobstructive atherosclerosis of coronary artery     2. Diastolic dysfunction     3. Nonrheumatic mitral valve regurgitation     4. Essential hypertension     5. Renal " transplant recipient     6. Weight loss            Plan:       1.  Nonobstructive Coronary Artery Disease: Noted per heart catheterization in May 2011.  Denies anginal symptoms.  Normal cardiac PET scan in 2020.    2.  Diastolic Dysfunction: Grade 1A diastolic dysfunction per echo in 2020.  Well compensated today.  Denies shortness of breath.    3.  Mitral Regurgitation: Mild per echocardiogram in 2020.    4.  Hypertension: Blood pressure well controlled.    5.  Renal Transplant Recipient: Follows with [facility] nephrology.    6.  Weight Loss: She has lost 38 pounds since her last visit with us in 2020.    7.  She will follow up with Dr. Capellan in 1 year.    As always, it has been a pleasure to participate in your patient's care. Thank you.       Sincerely,         ROMARIO Maldonado  [facility] Cardiology      · Dictated utilizing Dragon Dictation  · COVID-19 Precautions - Patient was compliant in wearing a mask. When I saw the patient, I used appropriate personal protective equipment (PPE) including mask and eye shield (standard procedure).  Additionally, I used gown and gloves if indicated.  Hand hygiene was completed before and after seeing the patient.

## 2022-04-29 ENCOUNTER — OFFICE VISIT (OUTPATIENT)
Dept: FAMILY MEDICINE CLINIC | Facility: CLINIC | Age: 72
End: 2022-04-29

## 2022-04-29 VITALS
WEIGHT: 189 LBS | SYSTOLIC BLOOD PRESSURE: 138 MMHG | RESPIRATION RATE: 16 BRPM | BODY MASS INDEX: 32.27 KG/M2 | OXYGEN SATURATION: 92 % | HEIGHT: 64 IN | DIASTOLIC BLOOD PRESSURE: 76 MMHG | HEART RATE: 72 BPM | TEMPERATURE: 98.4 F

## 2022-04-29 DIAGNOSIS — Z23 NEED FOR VACCINATION: ICD-10-CM

## 2022-04-29 DIAGNOSIS — Z00.00 MEDICARE ANNUAL WELLNESS VISIT, SUBSEQUENT: Primary | ICD-10-CM

## 2022-04-29 PROBLEM — K52.1 DIARRHEA DUE TO DRUG: Status: ACTIVE | Noted: 2019-09-11

## 2022-04-29 PROBLEM — J15.9 HEALTHCARE ASSOCIATED BACTERIAL PNEUMONIA: Status: ACTIVE | Noted: 2022-02-02

## 2022-04-29 PROCEDURE — 0051A COVID-19 (PFIZER) 12+ YRS: CPT | Performed by: FAMILY MEDICINE

## 2022-04-29 PROCEDURE — G0439 PPPS, SUBSEQ VISIT: HCPCS | Performed by: FAMILY MEDICINE

## 2022-04-29 PROCEDURE — 1160F RVW MEDS BY RX/DR IN RCRD: CPT | Performed by: FAMILY MEDICINE

## 2022-04-29 PROCEDURE — 91305 COVID-19 (PFIZER) 12+ YRS: CPT | Performed by: FAMILY MEDICINE

## 2022-04-29 PROCEDURE — 1170F FXNL STATUS ASSESSED: CPT | Performed by: FAMILY MEDICINE

## 2022-10-14 ENCOUNTER — TELEPHONE (OUTPATIENT)
Dept: FAMILY MEDICINE CLINIC | Facility: CLINIC | Age: 72
End: 2022-10-14

## 2022-10-14 NOTE — TELEPHONE ENCOUNTER
Caller: Diana Gee    Relationship: Self    Best call back number: [phone]    What medication are you requesting: SOMETHING FOR GOUT     What are your current symptoms: GOUT IN LEFT BIG TOE     How long have you been experiencing symptoms: 2 DAYS     Have you had these symptoms before:    [x] Yes  [] No    Have you been treated for these symptoms before:   [x] Yes  [] No    If a prescription is needed, what is your preferred pharmacy and phone number: [facility] [id number] - [address] AT [facility]. - [phone] [facility] [phone] FX     Additional notes: PATIENT CALLING STATING THAT SHE HAS GOUT IN HER LEFT BIG TOE IF SOMETHING CANT BE CALLED IN SHE WOULD LIKE TO BE SEEN TODAY

## 2022-10-14 NOTE — TELEPHONE ENCOUNTER
Pt has been called and advised to go to [facility] as we are just seeing the message and nothing is available today. Pt gave verbal understanding and will f/u after being seen at [facility].

## 2022-10-15 ENCOUNTER — HOSPITAL ENCOUNTER (EMERGENCY)
Facility: HOSPITAL | Age: 72
Discharge: HOME | End: 2022-10-15
Attending: EMERGENCY MEDICINE | Admitting: EMERGENCY MEDICINE

## 2022-10-15 ENCOUNTER — APPOINTMENT (OUTPATIENT)
Dept: GENERAL RADIOLOGY | Facility: HOSPITAL | Age: 72
End: 2022-10-15

## 2022-10-15 VITALS
RESPIRATION RATE: 14 BRPM | HEART RATE: 62 BPM | WEIGHT: 178 LBS | BODY MASS INDEX: 30.39 KG/M2 | OXYGEN SATURATION: 92 % | DIASTOLIC BLOOD PRESSURE: 81 MMHG | TEMPERATURE: 98.2 F | SYSTOLIC BLOOD PRESSURE: 164 MMHG | HEIGHT: 64 IN

## 2022-10-15 DIAGNOSIS — M10.9 ACUTE GOUT INVOLVING TOE OF LEFT FOOT, UNSPECIFIED CAUSE: Primary | ICD-10-CM

## 2022-10-15 PROCEDURE — 73660 X-RAY EXAM OF TOE(S): CPT

## 2022-10-15 PROCEDURE — 99283 EMERGENCY DEPT VISIT LOW MDM: CPT

## 2022-10-15 RX ORDER — COLCHICINE 0.6 MG/1
TABLET ORAL
Qty: 3 TABLET | Refills: 0 | Status: SHIPPED | OUTPATIENT
Start: 2022-10-15

## 2022-10-15 RX ORDER — HYDROCODONE BITARTRATE AND ACETAMINOPHEN 7.5; 325 MG/1; MG/1
1 TABLET ORAL ONCE
Status: COMPLETED | OUTPATIENT
Start: 2022-10-15 | End: 2022-10-15

## 2022-10-15 RX ORDER — HYDROCODONE BITARTRATE AND ACETAMINOPHEN 5; 325 MG/1; MG/1
1 TABLET ORAL EVERY 6 HOURS PRN
Qty: 10 TABLET | Refills: 0 | Status: SHIPPED | OUTPATIENT
Start: 2022-10-15

## 2022-10-15 RX ADMIN — HYDROCODONE BITARTRATE AND ACETAMINOPHEN 1 TABLET: 7.5; 325 TABLET ORAL at 12:32

## 2022-10-15 NOTE — ED NOTES
Pt to ED from home c/o left great toe pain, pt states it began Wednesday evening and has gotten worse, localized redness/swelling noted, pedal pulses palpable, pt has prior amputations to three other toes on left foot, no other complaints at this time  pt a/o x 4 at this time    PPE per protocol utilized throughout entire patient encounter.

## 2022-10-15 NOTE — DISCHARGE INSTRUCTIONS
Take medications as prescribed.  Return to the emergency department for worsening pain, increased swelling, fever, or other concern.  Follow-up with your primary care doctor if symptoms or not improving in the next few days

## 2022-10-15 NOTE — ED TRIAGE NOTES
Left big toe pain.  Nki.  She is diabetic and has been seen for sore on that toe but she reports it has healed.  She has hx gout    Patient was placed in face mask during first look triage.  Patient was wearing a face mask throughout encounter.  I wore personal protective equipment throughout the encounter.  Hand hygiene was performed before and after patient encounter.

## 2022-10-15 NOTE — ED PROVIDER NOTES
EMERGENCY DEPARTMENT ENCOUNTER    Room Number:  22/22  Date of encounter:  10/15/2022  PCP: Ghada Rangel MD  Historian: Patient     I used full protective equipment while examining this patient.  This includes face mask, gloves and protective eyewear.  I washed my hands before entering the room and immediately upon leaving the room.  Patient was wearing a surgical mask.      HPI:  Chief Complaint: Left great toe pain  A complete HPI/ROS/PMH/PSH/SH/FH are unobtainable due to: None    Context: Diana Gee is a 72 y.o. female, with history of diabetes and renal transplant, who presents to the ED c/o left great toe pain for the past 2 days.  Pain is constant and throbbing.  Pain is worse with movement and standing.  Pain is currently moderate in intensity.  Patient has a history of gout in the same toe and these symptoms feel similar.  Denies recent injury, fever, chest pain, shortness of breath, or new numbness/tingling/weakness in her extremities.  Patient has been seeing a podiatrist for the past few weeks for a wound on her left great toe.  She was last seen several days ago and told that her wound was basically healed.  She is not on antibiotics.  She has chronic neuropathy secondary to diabetes.      PAST MEDICAL HISTORY  Active Ambulatory Problems     Diagnosis Date Noted   • Type 2 diabetes mellitus (HCC) 12/09/2016   • Essential hypertension 12/09/2016   • Hyperlipidemia 12/09/2016   • Renal transplant recipient 12/09/2016   • Hepatitis C, treated 12/09/2016   • Carcinoma of right kidney, s/p nephrectomy 12/09/2016   • Neuropathy 12/09/2016   • Immunocompromised (HCC) 12/09/2016   • Neutropenia (HCC) 12/13/2016   • Anemia 12/13/2016   • Charcot's arthropathy 12/22/2016   • Mixed anxiety depressive disorder 12/22/2016   • Insomnia 12/22/2016   • Mitral valve insufficiency 12/22/2016   • Postablative hypothyroidism 06/22/2015   • Pulmonary nodule 05/31/2019   • Diastolic dysfunction 06/26/2019   •  Graves' disease 04/27/2021   • Nonobstructive atherosclerosis of coronary artery    • Diarrhea due to drug 09/11/2019   • Healthcare associated bacterial pneumonia 02/02/2022   • Hypomagnesemia 07/09/2012   • Hypophosphatemia 07/09/2012   • Incisional hernia 07/09/2012     Resolved Ambulatory Problems     Diagnosis Date Noted   • Abscess of third toe 12/09/2016   • Acute osteomyelitis of left foot (HCC) 12/10/2016   • Blood glucose abnormal 12/22/2016   • Dyspnea on exertion 12/22/2016   • Hypothyroidism 12/22/2016   • Osteomyelitis (HCC) 12/22/2016   • Peripheral edema 12/22/2016   • Pes planus 12/22/2016   • Secondary diabetes mellitus (HCC) 06/22/2015   • Subconjunctival hemorrhage 12/22/2016   • Kidney transplant status 12/22/2016   • De Quervain's tenosynovitis, right 06/19/2017   • DANNY (acute kidney injury) (HCC) 05/29/2019   • Acute kidney injury (HCC) 05/29/2019   • Fever 05/29/2019   • Diarrhea 05/30/2019   • Healthcare-associated pneumonia 04/27/2021   • Kidney disorder 04/27/2021     Past Medical History:   Diagnosis Date   • Abnormal glucose    • Acute bronchitis    • Acute upper respiratory infection    • C. difficile diarrhea 2008   • Carrier of viral hepatitis C (HCC)    • Cellulitis of foot    • Cerumen impaction    • Charcot's joint    • Depression with anxiety    • Diabetes mellitus (HCC)    • Disease of thyroid gland    • Failed kidney transplant    • Gastric ulcer    • GERD (gastroesophageal reflux disease)    • Gout    • Graves disease    • Heart murmur    • Hypertension    • Kidney carcinoma (HCC)    • Kidney failure    • MR (mitral regurgitation)    • Osteomyelitis of toe (HCC)    • Osteoporosis    • Transplant recipient 1995, 2011   • Type 2 diabetes mellitus with diabetic mononeuropathy (HCC)          PAST SURGICAL HISTORY  Past Surgical History:   Procedure Laterality Date   • AMPUTATION DIGIT Left 6/9/2016    Procedure: LT 4TH TOE AMPUTATION;  Surgeon: Navjot Gandara MD;  Location:   [facility];  Service:    • AMPUTATION DIGIT Left 12/15/2016    Procedure: LT THIRD TOE AMPUTATION;  Surgeon: Navjot Gandara MD;  Location: [facility] MAIN OR;  Service:    • ARTERIOVENOUS FISTULA Bilateral -LEFT ARM, -RIGHT ARM   • CHOLECYSTECTOMY     • COLONOSCOPY N/A 2019    Procedure: COLONOSCOPY WITHOUT PREP WITH BIOPSIES;  Surgeon: Cam Shin MD;  Location: [facility] ENDOSCOPY;  Service: Gastroenterology   • EYE SURGERY Bilateral ,     CATARACTS   • JOINT REPLACEMENT Left     SHOULDER   • NEPHRECTOMY Right     CANCER   • SKIN GRAFT Right     LEG   • TOE AMPUTATION Left     FIFTH TOE   • TONSILLECTOMY  1964   • TRANSPLANTATION RENAL Left 1995-RIGHT KIDNEY TRANSPLANT   • TUBAL ABDOMINAL LIGATION           FAMILY HISTORY  Family History   Problem Relation Age of Onset   • Hypothyroidism Mother    • COPD Mother    • COPD Father          SOCIAL HISTORY  Social History     Socioeconomic History   • Marital status:      Spouse name: Calvin   Tobacco Use   • Smoking status: Former     Packs/day: 0.50     Years: 4.00     Pack years: 2.00     Types: Cigarettes     Quit date: 1993     Years since quittin.8   • Smokeless tobacco: Never   Substance and Sexual Activity   • Alcohol use: No     Comment: caffeine use: 1 cup 3 times wkly   • Drug use: No   • Sexual activity: Defer         ALLERGIES  Heparin and Levofloxacin       REVIEW OF SYSTEMS  Review of Systems      All systems have been reviewed and are negative except as as discussed in the HPI    PHYSICAL EXAM    I have reviewed the triage vital signs and nursing notes.    ED Triage Vitals [10/15/22 1200]   Temp Heart Rate Resp BP SpO2   98.2 °F (36.8 °C) 72 16 -- 96 %      Temp src Heart Rate Source Patient Position BP Location FiO2 (%)   Tympanic Monitor -- -- --       Physical Exam  GENERAL: Awake, alert, oriented x3.  Well-developed, well-nourished and nontoxic-appearing female.  Resting  comfortably in no acute distress  HENT: NCAT, nares patent   EYES: no scleral icterus  CV: regular rhythm, regular rate, normal left pedal pulses  RESPIRATORY: normal effort, clear to auscultation bilaterally  ABDOMEN: soft, nontender  MUSCULOSKELETAL: There is swelling and generalized tenderness of the left great toe.  There is thickening of the skin over the distal aspect of this toe.  No wound.  No fluctuance.  No drainage.  No cellulitis.  Remainder of the left foot is nontender.  There has been previous amputation of the left third, fourth, and fifth toes.  NEURO: Speech is normal.  Toes up/down bilaterally.  Decreased light touch sensation in both feet (chronic and unchanged per patient)  SKIN: warm, dry, no rash  PSYCH: Normal mood and affect      LAB RESULTS  No results found for this or any previous visit (from the past 24 hour(s)).    Ordered the above labs and independently reviewed the results.      RADIOLOGY  XR Toe 2+ View Left    Result Date: 10/15/2022  THREE RADIOGRAPHIC VIEWS OF THE LEFT GREAT TOE  CLINICAL HISTORY: History of gout.  FINDINGS:   Three radiographic views of the left great toe demonstrate no convincing evidence for a marginal erosion as a sign of gout. Arthritic changes are seen within the interphalangeal joint of the great toe. Soft tissue swelling is incidentally noted.  This report was finalized on 10/15/2022 2:45 PM by Dr. Evan Hamilton M.D.        I ordered the above noted radiological studies. Reviewed by me and discussed with radiologist.  See dictation for official radiology interpretation.      PROCEDURES  Procedures      MEDICATIONS GIVEN IN ER    Medications   HYDROcodone-acetaminophen (NORCO) 7.5-325 MG per tablet 1 tablet (1 tablet Oral Given 10/15/22 1232)         PROGRESS, DATA ANALYSIS, CONSULTS, AND MEDICAL DECISION MAKING    All labs have been independently reviewed by me.  All radiology studies have been reviewed by me and discussed with radiologist dictating the  report.   EKG's independently viewed and interpreted by me.  I have reviewed the nurse's notes, vital signs, past medical history, and medication list.  Discussion below represents my analysis of pertinent findings related to patient's condition, differential diagnosis, treatment plan and final disposition.      ED Course as of 10/15/22 1829   Sat Oct 15, 2022   1217 Old records reviewed.  Patient was last admitted here in May 2019 for diarrhea, fatigue, and acute kidney injury. [WH]   1226 Patient presents to the ED with left great toe pain.  Toe does not appear infected.  Symptoms are consistent with gout.  Will obtain x-ray for further evaluation. [WH]   1317 Left toe x-rays interpreted by the radiologist.  Images intimately viewed by me.  There is no marginal erosion.  There are arthritic changes within the IP joint.  There is soft tissue swelling. [WH]   1328 X-ray results discussed with the patient.  She will be discharged with prescription for colchicine and short course of Norco.  She has tolerated colchicine in the past.  She is already on daily prednisone.  She cannot take NSAIDs secondary to her renal transplant.  Return precautions were discussed. [WH]      ED Course User Index  [WH] Valentin Khoury MD       AS OF 18:29 EDT VITALS:    BP - 164/81  HR - 62  TEMP - 98.2 °F (36.8 °C) (Tympanic)  O2 SATS - 92%      DIAGNOSIS  Final diagnoses:   Acute gout involving toe of left foot, unspecified cause         DISPOSITION  DISCHARGE    Patient discharged in stable condition.    Reviewed implications of results, diagnosis, meds, responsibility to follow up, warning signs and symptoms of possible worsening, potential complications and reasons to return to ER, including worsening pain, increased swelling, fever, or other concern.    Patient/Family voiced understanding of above instructions.    Discussed plan for discharge, as there is no emergent indication for admission. Patient referred to primary care  provider for BP management due to today's BP. Pt/family is agreeable and understands need for follow up and repeat testing.  Pt is aware that discharge does not mean that nothing is wrong but it indicates no emergency is present that requires admission and they must continue care with follow-up as given below or physician of their choice.     FOLLOW-UP  Ghada Rangel MD  [address]  [address]  [place] [id number] [phone]    Schedule an appointment as soon as possible for a visit   If symptoms persist         Medication List      New Prescriptions    colchicine 0.6 MG tablet  Take 2 tablets by mouth times 1.  Then take 1 tablet by mouth 1 hour later.     HYDROcodone-acetaminophen 5-325 MG per tablet  Commonly known as: NORCO  Take 1 tablet by mouth Every 6 (Six) Hours As Needed for Moderate Pain.        Changed    sucralfate 1 g tablet  Commonly known as: Carafate  Take 1 tablet by mouth 4 (Four) Times a Day. Before meals and at bedtime  What changed:   · when to take this  · reasons to take this           Where to Get Your Medications      These medications were sent to [facility] [id number] - [facility] [address] [place] [address] RD AT [facility] [address] - [phone] [facility] [phone]   [address] [address], [place] [id number]    Phone: [phone]   · colchicine 0.6 MG tablet  · HYDROcodone-acetaminophen 5-325 MG per tablet           Dictated utilizing Dragon dictation     Valentin Khoury MD  10/15/22 9986

## 2023-03-28 RX ORDER — ESCITALOPRAM OXALATE 20 MG/1
TABLET ORAL
Qty: 90 TABLET | Refills: 1 | Status: SHIPPED | OUTPATIENT
Start: 2023-03-28

## 2023-05-02 ENCOUNTER — OFFICE VISIT (OUTPATIENT)
Dept: FAMILY MEDICINE CLINIC | Facility: CLINIC | Age: 73
End: 2023-05-02
Payer: MEDICARE

## 2023-05-02 VITALS
RESPIRATION RATE: 18 BRPM | HEIGHT: 64 IN | DIASTOLIC BLOOD PRESSURE: 60 MMHG | SYSTOLIC BLOOD PRESSURE: 128 MMHG | TEMPERATURE: 98.4 F | HEART RATE: 74 BPM | OXYGEN SATURATION: 94 % | WEIGHT: 185 LBS | BODY MASS INDEX: 31.58 KG/M2

## 2023-05-02 DIAGNOSIS — Z00.00 MEDICARE ANNUAL WELLNESS VISIT, SUBSEQUENT: Primary | ICD-10-CM

## 2023-05-02 PROCEDURE — 1170F FXNL STATUS ASSESSED: CPT | Performed by: FAMILY MEDICINE

## 2023-05-02 PROCEDURE — 1159F MED LIST DOCD IN RCRD: CPT | Performed by: FAMILY MEDICINE

## 2023-05-02 PROCEDURE — 3078F DIAST BP <80 MM HG: CPT | Performed by: FAMILY MEDICINE

## 2023-05-02 PROCEDURE — G0439 PPPS, SUBSEQ VISIT: HCPCS | Performed by: FAMILY MEDICINE

## 2023-05-02 PROCEDURE — 90677 PCV20 VACCINE IM: CPT | Performed by: FAMILY MEDICINE

## 2023-05-02 PROCEDURE — G0009 ADMIN PNEUMOCOCCAL VACCINE: HCPCS | Performed by: FAMILY MEDICINE

## 2023-05-02 PROCEDURE — 3074F SYST BP LT 130 MM HG: CPT | Performed by: FAMILY MEDICINE

## 2023-05-02 PROCEDURE — 1160F RVW MEDS BY RX/DR IN RCRD: CPT | Performed by: FAMILY MEDICINE

## 2023-05-02 RX ORDER — BLOOD SUGAR DIAGNOSTIC
STRIP MISCELLANEOUS
COMMUNITY
Start: 2023-04-13

## 2023-05-02 RX ORDER — ESCITALOPRAM OXALATE 20 MG/1
20 TABLET ORAL DAILY
Qty: 90 TABLET | Refills: 3 | Status: SHIPPED | OUTPATIENT
Start: 2023-05-02

## 2023-05-02 NOTE — PROGRESS NOTES
The ABCs of the Annual Wellness Visit  Subsequent Medicare Wellness Visit    Subjective      Diana Gee is a 73 y.o. female who presents for a Subsequent Medicare Wellness Visit.    The following portions of the patient's history were reviewed and   updated as appropriate: allergies, current medications, past family history, past medical history, past social history, past surgical history and problem list.    Compared to one year ago, the patient feels her physical   health is the same.    Compared to one year ago, the patient feels her mental   health is the same.    Recent Hospitalizations:  She was not admitted to the hospital during the last year.       Current Medical Providers:  Patient Care Team:  Ghada Rangel MD as PCP - General (Family Medicine)  Alberto Myers MD as Referring Physician (Urology)  Yanni Saucedo MD as Consulting Physician (Hematology and Oncology)  Dinora Hernandez MD as Consulting Physician (Endocrinology)  Ragini Capellan MD as Consulting Physician (Cardiology)  Davis Pringle MD (Nephrology)    Outpatient Medications Prior to Visit   Medication Sig Dispense Refill   • Accu-Chek FastClix Lancets misc      • aspirin 81 MG EC tablet Take 1 tablet by mouth Daily.     • azaTHIOprine (IMURAN) 50 MG tablet Take 1 tablet by mouth Daily. 3 tablets once daily     • carvedilol (COREG) 25 MG tablet Take 1 tablet by mouth 2 (Two) Times a Day With Meals.     • Cholecalciferol (VITAMIN D3) 5000 units tablet tablet Take 1 tablet by mouth Daily.     • esomeprazole (nexIUM) 20 MG capsule Take 1 capsule by mouth Every Morning Before Breakfast.     • glucose blood (Accu-Chek SmartView) test strip 1 each by Other route 2 (Two) Times a Day.     • insulin detemir (LEVEMIR) 100 UNIT/ML injection Inject 10 Units under the skin into the appropriate area as directed Every Night.     • insulin lispro (humaLOG) 100 UNIT/ML injection Inject 4 Units under the skin into the  appropriate area as directed Every Morning.     • levothyroxine (SYNTHROID, LEVOTHROID) 125 MCG tablet Take 1 tablet by mouth Daily.     • magnesium oxide (MAG-OX) 400 MG tablet Take 2 tablets by mouth 2 (Two) Times a Day.     • metFORMIN ER (GLUCOPHAGE-XR) 500 MG 24 hr tablet Take 750 mg by mouth Daily With Breakfast.     • Multiple Vitamins-Minerals (MULTIVITAMIN WITH MINERALS) tablet tablet Take 1 tablet by mouth Daily.     • nystatin 835208 UNIT/GM topical powder As Needed.     • olmesartan (BENICAR) 5 MG tablet Take 1 tablet by mouth Daily.     • predniSONE (DELTASONE) 10 MG tablet Take 2.5 mg by mouth Daily.     • rosuvastatin (CRESTOR) 40 MG tablet Take 1 tablet by mouth Daily.     • sucralfate (Carafate) 1 g tablet Take 1 tablet by mouth 4 (Four) Times a Day. Before meals and at bedtime (Patient taking differently: Take 1 tablet by mouth Daily As Needed. Before meals and at bedtime) 60 tablet 1   • tacrolimus (PROGRAF) 1 MG capsule Take 1 capsule by mouth 2 (Two) Times a Day.     • escitalopram (LEXAPRO) 20 MG tablet TAKE 1 TABLET DAILY 90 tablet 1   • Accu-Chek SmartView test strip      • colchicine 0.6 MG tablet Take 2 tablets by mouth times 1.  Then take 1 tablet by mouth 1 hour later. 3 tablet 0   • HYDROcodone-acetaminophen (NORCO) 5-325 MG per tablet Take 1 tablet by mouth Every 6 (Six) Hours As Needed for Moderate Pain. 10 tablet 0     No facility-administered medications prior to visit.       No opioid medication identified on active medication list. I have reviewed chart for other potential  high risk medication/s and harmful drug interactions in the elderly.          Aspirin is on active medication list. Aspirin use is indicated based on review of current medical condition/s. Pros and cons of this therapy have been discussed today. Benefits of this medication outweigh potential harm.  Patient has been encouraged to continue taking this medication.  .      Patient Active Problem List   Diagnosis   •  "Type 2 diabetes mellitus   • Essential hypertension   • Hyperlipidemia   • Renal transplant recipient   • Hepatitis C, treated   • Carcinoma of right kidney, s/p nephrectomy   • Neuropathy   • Immunocompromised   • Neutropenia   • Anemia   • Charcot's arthropathy   • Mixed anxiety depressive disorder   • Insomnia   • Mitral valve insufficiency   • Postablative hypothyroidism   • Pulmonary nodule   • Diastolic dysfunction   • Graves' disease   • Nonobstructive atherosclerosis of coronary artery   • Diarrhea due to drug   • Healthcare associated bacterial pneumonia   • Hypomagnesemia   • Hypophosphatemia   • Incisional hernia     Advance Care Planning   Advance Care Planning     Advance Directive is not on file.  ACP discussion was held with the patient during this visit. Patient does not have an advance directive, declines further assistance.     Objective    Vitals:    05/02/23 1534   BP: 128/60   BP Location: Right arm   Patient Position: Sitting   Cuff Size: Adult   Pulse: 74   Resp: 18   Temp: 98.4 °F (36.9 °C)   TempSrc: Infrared   SpO2: 94%   Weight: 83.9 kg (185 lb)   Height: 162.6 cm (64\")   PainSc: 0-No pain     Estimated body mass index is 31.76 kg/m² as calculated from the following:    Height as of this encounter: 162.6 cm (64\").    Weight as of this encounter: 83.9 kg (185 lb).    BMI is >= 30 and <35. (Class 1 Obesity). The following options were offered after discussion;: exercise counseling/recommendations and nutrition counseling/recommendations    Physical Exam  Vitals reviewed.   Constitutional:       General: She is not in acute distress.     Appearance: Normal appearance. She is obese. She is not ill-appearing or toxic-appearing.   HENT:      Head: Normocephalic and atraumatic.      Right Ear: Tympanic membrane, ear canal and external ear normal.      Left Ear: Tympanic membrane, ear canal and external ear normal.      Ears:      Comments: Small cerumen in canals no obstruction.     Nose: Nose " normal.      Mouth/Throat:      Mouth: Mucous membranes are moist.      Pharynx: Oropharynx is clear. No oropharyngeal exudate or posterior oropharyngeal erythema.   Eyes:      General: No scleral icterus.        Right eye: No discharge.         Left eye: No discharge.      Conjunctiva/sclera: Conjunctivae normal.   Neck:      Thyroid: No thyromegaly.      Vascular: No carotid bruit.   Cardiovascular:      Rate and Rhythm: Normal rate and regular rhythm.      Pulses:           Posterior tibial pulses are 1+ on the right side and 1+ on the left side.      Heart sounds: Normal heart sounds. No murmur heard.    No friction rub. No gallop.   Pulmonary:      Effort: Pulmonary effort is normal. No respiratory distress.      Breath sounds: Normal breath sounds. No wheezing or rales.   Chest:      Chest wall: No tenderness.   Abdominal:      General: Bowel sounds are normal. There is no distension.      Palpations: Abdomen is soft. There is no mass.      Tenderness: There is no abdominal tenderness. There is no guarding.   Musculoskeletal:         General: No deformity.      Cervical back: Neck supple.      Right lower leg: No edema.      Left lower leg: No edema.   Lymphadenopathy:      Cervical: No cervical adenopathy.   Skin:     Coloration: Skin is not jaundiced or pale.   Neurological:      General: No focal deficit present.      Mental Status: She is alert and oriented to person, place, and time.      Motor: No abnormal muscle tone.      Coordination: Coordination normal.   Psychiatric:         Mood and Affect: Mood normal.         Behavior: Behavior normal.           Does the patient have evidence of cognitive impairment?   No    Lab Results   Component Value Date    HGBA1C 6.2 (H) 02/15/2023          HEALTH RISK ASSESSMENT    Smoking Status:  Social History     Tobacco Use   Smoking Status Former   • Packs/day: 0.50   • Years: 4.00   • Pack years: 2.00   • Types: Cigarettes   • Quit date: 1/1/1993   • Years since  quittin.3   Smokeless Tobacco Never     Alcohol Consumption:  Social History     Substance and Sexual Activity   Alcohol Use No    Comment: caffeine use: 1 cup 3 times wkly     Fall Risk Screen:    HERMINIO Fall Risk Assessment was completed, and patient is at LOW risk for falls.Assessment completed on:2023    Depression Screenin/2/2023     3:41 PM   PHQ-2/PHQ-9 Depression Screening   Little Interest or Pleasure in Doing Things 0-->not at all   Feeling Down, Depressed or Hopeless 0-->not at all   PHQ-9: Brief Depression Severity Measure Score 0       Health Habits and Functional and Cognitive Screenin/2/2023     3:41 PM   Functional & Cognitive Status   Do you have difficulty preparing food and eating? No   Do you have difficulty bathing yourself, getting dressed or grooming yourself? No   Do you have difficulty using the toilet? No   Do you have difficulty moving around from place to place? No   Do you have trouble with steps or getting out of a bed or a chair? No   Current Diet Diabetic Diet   Dental Exam Up to date   Eye Exam Not up to date   Exercise (times per week) 7 times per week   Current Exercises Include Walking   Do you need help using the phone?  No   Are you deaf or do you have serious difficulty hearing?  No   Do you need help with transportation? No   Do you need help shopping? No   Do you need help preparing meals?  No   Do you need help with housework?  No   Do you need help with laundry? No   Do you need help taking your medications? No   Do you need help managing money? No   Do you ever drive or ride in a car without wearing a seat belt? No   Have you felt unusual stress, anger or loneliness in the last month? No   Who do you live with? Spouse   If you need help, do you have trouble finding someone available to you? No   Have you been bothered in the last four weeks by sexual problems? No   Do you have difficulty concentrating, remembering or making decisions? No        Age-appropriate Screening Schedule:  Refer to the list below for future screening recommendations based on patient's age, sex and/or medical conditions. Orders for these recommended tests are listed in the plan section. The patient has been provided with a written plan.    Health Maintenance   Topic Date Due   • Hepatitis B (1 of 3 - 3-dose series) Never done   • MAMMOGRAM  Never done   • ZOSTER VACCINE (1 of 2) Never done   • DIABETIC EYE EXAM  06/29/2018   • DXA SCAN  08/13/2021   • COVID-19 Vaccine (5 - Booster for Moderna series) 06/24/2022   • TDAP/TD VACCINES (1 - Tdap) 05/02/2023 (Originally 3/31/1969)   • DIABETIC FOOT EXAM  05/17/2023 (Originally 4/14/2022)   • INFLUENZA VACCINE  08/01/2023   • HEMOGLOBIN A1C  08/15/2023   • LIPID PANEL  02/15/2024   • URINE MICROALBUMIN  04/13/2024   • ANNUAL WELLNESS VISIT  05/02/2024   • COLORECTAL CANCER SCREENING  05/31/2029   • HEPATITIS C SCREENING  Completed   • Pneumococcal Vaccine 65+  Completed                  CMS Preventative Services Quick Reference  Risk Factors Identified During Encounter:    Immunizations Discussed/Encouraged: Prevnar 20 (Pneumococcal 20-valent conjugate) and COVID19  Vision Screening Recommended    The above risks/problems have been discussed with the patient.  Pertinent information has been shared with the patient in the After Visit Summary.    Diagnoses and all orders for this visit:    1. Medicare annual wellness visit, subsequent (Primary)    Other orders  -     Pneumococcal Conjugate Vaccine 20-Valent (PCV20)  -     escitalopram (LEXAPRO) 20 MG tablet; Take 1 tablet by mouth Daily.  Dispense: 90 tablet; Refill: 3        Follow Up:   Next Medicare Wellness visit to be scheduled in 1 year.      An After Visit Summary and PPPS were made available to the patient.    Kidney transplant pt. Hx of RCC 2008. Follows with transplant. Sees next month.   She is maintained on tacrolimus, prednisone low dose. She is on low dose levemir 5 U  and humalog with breakfast, 5 U.   Her A1c has been in 6.2% range for over a year.   Diabetes is complicated by left Charcot's, neuropathy, and amputation of 3rd, 4th, 5th left toe amputations.   Also with post radioablation hypothyroidism, followed by Dr. Garcia.     Hx of hep C in remission.     She is doing well. Working 5 days per week at [employer]. This is her fourth year and gets her steps in.   She follows with Dr. Ragini Bishop for Mammogram and DXA.   Gave her prevnar 20. She had pneumovax 23 when she turned 67. She is high risk being transplant pt. Encouraged her with new guidelines to go get the moderna bivalent booster at this time.     She follows with podiatry and she follows with Kimmy for eye doctor.

## 2023-05-17 ENCOUNTER — OFFICE VISIT (OUTPATIENT)
Dept: CARDIOLOGY | Facility: CLINIC | Age: 73
End: 2023-05-17
Payer: MEDICARE

## 2023-05-17 VITALS
SYSTOLIC BLOOD PRESSURE: 126 MMHG | HEIGHT: 64 IN | DIASTOLIC BLOOD PRESSURE: 70 MMHG | HEART RATE: 65 BPM | BODY MASS INDEX: 30.9 KG/M2 | WEIGHT: 181 LBS

## 2023-05-17 DIAGNOSIS — I34.0 NONRHEUMATIC MITRAL VALVE REGURGITATION: Primary | ICD-10-CM

## 2023-05-17 DIAGNOSIS — I51.89 DIASTOLIC DYSFUNCTION: ICD-10-CM

## 2023-05-17 DIAGNOSIS — Z94.0 RENAL TRANSPLANT RECIPIENT: ICD-10-CM

## 2023-05-17 DIAGNOSIS — I35.1 NONRHEUMATIC AORTIC VALVE INSUFFICIENCY: ICD-10-CM

## 2023-05-17 DIAGNOSIS — I10 ESSENTIAL HYPERTENSION: ICD-10-CM

## 2023-05-17 DIAGNOSIS — E78.2 MIXED HYPERLIPIDEMIA: ICD-10-CM

## 2023-05-17 DIAGNOSIS — I25.10 NONOBSTRUCTIVE ATHEROSCLEROSIS OF CORONARY ARTERY: ICD-10-CM

## 2023-05-17 DIAGNOSIS — E11.59 TYPE 2 DIABETES MELLITUS WITH OTHER CIRCULATORY COMPLICATION, UNSPECIFIED WHETHER LONG TERM INSULIN USE: ICD-10-CM

## 2023-05-17 NOTE — PROGRESS NOTES
"      [facility] CARDIOLOGY  [address] [facility] [address]  [place] [address]  Phone: [phone]  Fax: [phone]  Patient Name: Diana Gee  :1950  Age: 73 y.o.  Primary Cardiologist: Ragini Capellan MD  Encounter Provider:  ROMARIO Guerrero    History of Present Illness     Diana Gee is a 73 y.o. Black female whose medical history includes glomerulonephritis in  requiring dialysis and then kidney transplant x2, type 2 diabetes, history of hepatitis C, history of DVT to the right leg in , gout, thyroid disease, hypertension, hyperlipidemia, and rheumatic fever as a child.  She is followed in our office by Dr. Capellan for mitral valve regurgitation. I have reviewed the past medical records in 2023 preparation of today's visit.     23 Follow-up:  She is here for 1 year follow-up and I am seeing her for the first time today.  She has had a good year and there have been no changes to her medical history.  Her medications remain the same.  She denies chest pain, dyspnea with exertion, orthopnea, palpitations, or lightheadedness.  She has rare leg swelling but it is usually when it is hot outside.  She is taking her medications as prescribed.    Data Review     The following data was reviewed by ROMARIO Guerrero on 23:    Vital Signs:   /70   Pulse 65   Ht 162.6 cm (64\")   Wt 82.1 kg (181 lb)   BMI 31.07 kg/m²       Weight:  Wt Readings from Last 3 Encounters:   23 82.1 kg (181 lb)   23 83.9 kg (185 lb)   10/15/22 80.7 kg (178 lb)     Body mass index is 31.07 kg/m².    Below is a summary of pertinent cardiology findings:  •  she had cardiac catheterization prior to kidney transplant at [facility].  It showed normal left and right-sided filling pressures, mild coronary artery disease, normal LVEDP, and no pulmonary artery hypertension.  There was only luminal irregularities in the LAD but all " other vessels were normal.  • June 2015 echocardiogram showed EF 55 to 60%, grade 2 diastolic dysfunction, trace to mild mitral insufficiency, severe left atrial enlargement, calcification of the LVOT without obstruction.  Stress PET study was normal; this was done for dyspnea with exertion.  • June 2015 lower extremity arterial Doppler study was normal.  • July 2020 echocardiogram showed EF 64%, grade 1A diastolic dysfunction, left atrial volume moderately increased, aortic valve sclerosis, and mild mitral valve regurgitation.  Stress test was normal.    Labs:  • 04/13/2023:  cr 0.9, K 4.1, otherwise unremarkable CMP, Hgb 11.9, Plt 180      ECG 12 Lead    Date/Time: 5/17/2023 3:27 PM  Performed by: Carmencita Adam APRN  Authorized by: Carmencita Adam APRN   Comparison: compared with previous ECG from 4/11/2022  Similar to previous ECG  Rhythm: sinus rhythm  Rate: normal  BPM: 65  ST Elevation: II, III and aVF    Clinical impression: non-specific ECG            Medications     Allergies as of 05/17/2023 - Reviewed 05/17/2023   Allergen Reaction Noted   • Heparin Hives and Myalgia 06/15/2015   • Levofloxacin Hives and Anaphylaxis 07/02/2012         Current Outpatient Medications:   •  Accu-Chek FastClix Lancets misc, , Disp: , Rfl:   •  Accu-Chek SmartView test strip, , Disp: , Rfl:   •  aspirin 81 MG EC tablet, Take 1 tablet by mouth Daily., Disp: , Rfl:   •  azaTHIOprine (IMURAN) 50 MG tablet, Take 1 tablet by mouth Daily. 3 tablets once daily, Disp: , Rfl:   •  carvedilol (COREG) 25 MG tablet, Take 1 tablet by mouth 2 (Two) Times a Day With Meals., Disp: , Rfl:   •  Cholecalciferol (VITAMIN D3) 5000 units tablet tablet, Take 1 tablet by mouth Daily., Disp: , Rfl:   •  escitalopram (LEXAPRO) 20 MG tablet, Take 1 tablet by mouth Daily., Disp: 90 tablet, Rfl: 3  •  esomeprazole (nexIUM) 20 MG capsule, Take 1 capsule by mouth Every Morning Before Breakfast., Disp: , Rfl:   •  glucose blood test  strip, 1 each by Other route 2 (Two) Times a Day., Disp: , Rfl:   •  insulin detemir (LEVEMIR) 100 UNIT/ML injection, Inject 10 Units under the skin into the appropriate area as directed Every Night., Disp: , Rfl:   •  insulin lispro (humaLOG) 100 UNIT/ML injection, Inject 4 Units under the skin into the appropriate area as directed Every Morning., Disp: , Rfl:   •  levothyroxine (SYNTHROID, LEVOTHROID) 125 MCG tablet, Take 1 tablet by mouth Daily., Disp: , Rfl:   •  magnesium oxide (MAG-OX) 400 MG tablet, Take 2 tablets by mouth 2 (Two) Times a Day., Disp: , Rfl:   •  metFORMIN ER (GLUCOPHAGE-XR) 500 MG 24 hr tablet, Take 750 mg by mouth Daily With Breakfast., Disp: , Rfl:   •  Multiple Vitamins-Minerals (MULTIVITAMIN WITH MINERALS) tablet tablet, Take 1 tablet by mouth Daily., Disp: , Rfl:   •  nystatin 955758 UNIT/GM topical powder, As Needed., Disp: , Rfl:   •  olmesartan (BENICAR) 5 MG tablet, Take 1 tablet by mouth Daily., Disp: , Rfl:   •  predniSONE (DELTASONE) 10 MG tablet, Take 2.5 mg by mouth Daily., Disp: , Rfl:   •  rosuvastatin (CRESTOR) 40 MG tablet, Take 1 tablet by mouth Daily., Disp: , Rfl:   •  sucralfate (Carafate) 1 g tablet, Take 1 tablet by mouth 4 (Four) Times a Day. Before meals and at bedtime (Patient taking differently: Take 1 tablet by mouth Daily As Needed. Before meals and at bedtime), Disp: 60 tablet, Rfl: 1  •  tacrolimus (PROGRAF) 1 MG capsule, Take 1 capsule by mouth 2 (Two) Times a Day., Disp: , Rfl:      Past History, Review of Systems, Exam     Past Medical History:   Diagnosis Date   • Abnormal glucose    • Acute bronchitis    • Acute upper respiratory infection    • Anemia    • C. difficile diarrhea 2008   • Carrier of viral hepatitis C    • Cellulitis of foot    • Cerumen impaction    • Charcot's joint    • Depression with anxiety    • Diabetes mellitus    • Diastolic dysfunction    • Disease of thyroid gland    • Failed kidney transplant    • Gastric ulcer    • GERD  (gastroesophageal reflux disease)    • Gout    • Graves disease    • Healthcare-associated pneumonia 2021   • Heart murmur    • Hepatitis C    • Hyperlipidemia    • Hypertension    • Hypothyroidism    • Insomnia    • Kidney carcinoma    • Kidney failure    • MR (mitral regurgitation)    • Neuropathy    • Osteomyelitis of toe    • Osteoporosis    • Peripheral edema    • Pes planus    • Subconjunctival hemorrhage    • Transplant recipient ,    • Type 2 diabetes mellitus with diabetic mononeuropathy        Past Surgical History:   has a past surgical history that includes Transplantation renal (Left, ); Cholecystectomy (); Tubal ligation; Nephrectomy (Right, ); Joint replacement (Left, ); Toe amputation (Left, ); Skin graft (Right, ); Eye surgery (Bilateral, , ); Tonsillectomy (); AV fistula placement (Bilateral, -LEFT ARM, -RIGHT ARM); Finger amputation (Left, 2016); Finger amputation (Left, 12/15/2016); and Colonoscopy (N/A, 2019).     Social History     Socioeconomic History   • Marital status:      Spouse name: Calvin   Tobacco Use   • Smoking status: Former     Packs/day: 0.50     Years: 4.00     Pack years: 2.00     Types: Cigarettes     Quit date: 1993     Years since quittin.3   • Smokeless tobacco: Never   Substance and Sexual Activity   • Alcohol use: No     Comment: caffeine use: 1 cup 3 times wkly   • Drug use: No   • Sexual activity: Defer       Review of Systems   Cardiovascular: Positive for leg swelling. Negative for chest pain, claudication, cyanosis, dyspnea on exertion, irregular heartbeat, near-syncope, orthopnea, palpitations, paroxysmal nocturnal dyspnea and syncope.       Vitals reviewed.   Constitutional:       Appearance: Not in distress.   Eyes:      Conjunctiva/sclera: Conjunctivae normal.      Pupils: Pupils are equal, round, and reactive to light.   HENT:      Head: Normocephalic.      Nose: Nose normal.     Mouth/Throat:      Pharynx: Oropharynx is clear.   Neck:      Vascular: JVD normal.   Pulmonary:      Effort: Pulmonary effort is normal.      Breath sounds: Normal breath sounds. No wheezing. No rhonchi. No rales.   Cardiovascular:      Normal rate. Regular rhythm. Normal S1. Normal S2.      Murmurs: There is no murmur.   Pulses:     Intact distal pulses.   Edema:     Peripheral edema absent.   Abdominal:      General: Bowel sounds are normal. There is no distension.      Palpations: Abdomen is soft.      Tenderness: There is no abdominal tenderness.   Musculoskeletal: Normal range of motion.      Cervical back: Normal range of motion and neck supple. Skin:     General: Skin is warm and dry.   Neurological:      Mental Status: Alert and oriented to person, place and time.   Psychiatric:         Attention and Perception: Attention normal.         Mood and Affect: Mood normal.         Speech: Speech normal.         Behavior: Behavior is cooperative.          Assessment and Plan     Assessment:  1. Nonrheumatic mitral valve regurgitation    2. Nonrheumatic aortic valve insufficiency    3. Nonobstructive atherosclerosis of coronary artery    4. Diastolic dysfunction    5. Essential hypertension    6. Mixed hyperlipidemia    7. Type 2 diabetes mellitus with other circulatory complication, unspecified whether long term insulin use    8. Renal transplant recipient         1. Mitral valve regurgitation: She was noted to have trace to mild mitral valve insufficiency on January 2015 echocardiogram; this was graded as mild on July 2020 echocardiogram.  She is compensated by exam today.  2. Aortic valve disease: She has been noted to have aortic valve sclerosis but without insufficiency or stenosis.  3. Nonobstructive coronary artery disease.  In 2011 with kidney transplant work-up she had cardiac catheterization which showed normal left and right-sided filling pressures, mild coronary artery disease which was luminal  irregularities in the LAD but normal coronary arteries otherwise, normal LVEDP, and no pulmonary artery hypertension.  She denies chest pain and there are no EKG changes today.  4. Diastolic dysfunction: July 2020 echocardiogram showed grade 1A diastolic dysfunction.  She has no leg swelling by exam today.  5. Hypertension: Her blood pressure is controlled on 5 mg olmesartan daily and 25 mg carvedilol twice daily.  6. Hyperlipidemia: No recent lipids to review but she is treated with 40 mg rosuvastatin daily.  7. Type 2 diabetes: This became an issue after her first transplant and affected the viability of that transplant.  No recent hemoglobin A1c to review.  She is treated with insulin and metformin.  8. Renal transplant recipient.  She had history of glomerulonephritis and required hemodialysis and kidney transplant x2.  Her renal function is stable.  She follows with [facility] center.    Ms. Gee is a patient of Dr. Capellan's with history of nonobstructive coronary artery disease, and echocardiogram showing mitral valve regurgitation, aortic valve sclerosis, and diastolic dysfunction.  She has history of kidney transplant x2 and has been on dialysis on 2 occasions.  She is doing well from a cardiac standpoint.  I made no medication changes today.  She will see Dr. Capellan in 1 year.    Return in about 1 year (around 5/17/2024) for Follow-up with Dr. Capellan.  Orders Placed This Encounter   Procedures   • ECG 12 Lead      No orders of the defined types were placed in this encounter.        Thank you for the opportunity to participate in this patient's care.    ROMARIO Dixon    This office note has been dictated.

## 2023-09-12 RX ORDER — ESCITALOPRAM OXALATE 20 MG/1
20 TABLET ORAL DAILY
Qty: 90 TABLET | Refills: 0 | Status: SHIPPED | OUTPATIENT
Start: 2023-09-12

## 2023-09-12 NOTE — TELEPHONE ENCOUNTER
Pt previously seen  and was last seen by James Epley in 2021. She is sched to est care with James Epley on 9/29/2023 and would like a 90 supply on her Escitalopram 20mg. Please advise on refill

## 2023-09-12 NOTE — TELEPHONE ENCOUNTER
Rx Refill Note  Requested Prescriptions     Pending Prescriptions Disp Refills    escitalopram (LEXAPRO) 20 MG tablet 90 tablet 1     Sig: Take 1 tablet by mouth Daily.      Last office visit with prescribing clinician: 11/11/2021   Last telemedicine visit with prescribing clinician: Visit date not found   Next office visit with prescribing clinician: 9/29/2023                         Would you like a call back once the refill request has been completed: [] Yes [] No    If the office needs to give you a call back, can they leave a voicemail: [] Yes [] No    Rosalio Leger  09/12/23, 13:09 EDT

## 2023-10-17 ENCOUNTER — OFFICE VISIT (OUTPATIENT)
Dept: FAMILY MEDICINE CLINIC | Facility: CLINIC | Age: 73
End: 2023-10-17
Payer: MEDICARE

## 2023-10-17 VITALS
SYSTOLIC BLOOD PRESSURE: 169 MMHG | TEMPERATURE: 98.2 F | HEART RATE: 62 BPM | BODY MASS INDEX: 35.51 KG/M2 | WEIGHT: 208 LBS | DIASTOLIC BLOOD PRESSURE: 81 MMHG | HEIGHT: 64 IN | OXYGEN SATURATION: 97 %

## 2023-10-17 DIAGNOSIS — R68.89 DECREASED STRENGTH, ENDURANCE, AND MOBILITY: ICD-10-CM

## 2023-10-17 DIAGNOSIS — I73.9 PAD (PERIPHERAL ARTERY DISEASE): ICD-10-CM

## 2023-10-17 DIAGNOSIS — E78.2 MIXED HYPERLIPIDEMIA: ICD-10-CM

## 2023-10-17 DIAGNOSIS — Z79.4 TYPE 2 DIABETES MELLITUS WITH OTHER CIRCULATORY COMPLICATION, WITH LONG-TERM CURRENT USE OF INSULIN: ICD-10-CM

## 2023-10-17 DIAGNOSIS — Z74.09 DECREASED STRENGTH, ENDURANCE, AND MOBILITY: ICD-10-CM

## 2023-10-17 DIAGNOSIS — E11.59 TYPE 2 DIABETES MELLITUS WITH OTHER CIRCULATORY COMPLICATION, WITH LONG-TERM CURRENT USE OF INSULIN: ICD-10-CM

## 2023-10-17 DIAGNOSIS — Z94.0 RENAL TRANSPLANT RECIPIENT: ICD-10-CM

## 2023-10-17 DIAGNOSIS — R53.1 DECREASED STRENGTH, ENDURANCE, AND MOBILITY: ICD-10-CM

## 2023-10-17 DIAGNOSIS — I10 ESSENTIAL HYPERTENSION: Primary | ICD-10-CM

## 2023-10-17 DIAGNOSIS — C64.1 CARCINOMA OF RIGHT KIDNEY: ICD-10-CM

## 2023-10-17 DIAGNOSIS — E89.0 POSTABLATIVE HYPOTHYROIDISM: ICD-10-CM

## 2023-10-17 PROCEDURE — 3079F DIAST BP 80-89 MM HG: CPT | Performed by: NURSE PRACTITIONER

## 2023-10-17 PROCEDURE — 3077F SYST BP >= 140 MM HG: CPT | Performed by: NURSE PRACTITIONER

## 2023-10-17 PROCEDURE — 99214 OFFICE O/P EST MOD 30 MIN: CPT | Performed by: NURSE PRACTITIONER

## 2023-10-17 NOTE — PROGRESS NOTES
"Chief Complaint  Establish Care    Subjective        Diana Gee presents to [facility]  History of Present Illness  Very pleasant patient here today, needs a new PCP Dr. Rangel had left the practice unfortunately,  She is doing quite well presently sees endocrinology A1c 6.2 excellent control  Essential hypertension compliant with antihypertensives she checks blood pressure at home it is controlled as well a little high today she will simply recheck  Mixed hyperlipidemia takes a statin appropriately she takes baby aspirin as well history of amputation left lower extremity digits 3 through 5 in early 2000  She did have a Doppler study in 2020 showing normal arterial circulation with the Doppler at rest  She has peripheral neuropathy in the feet as well and this complicates walking as well just does not have the strength and endurance with her core when she is trying to maneuver  At home such as in the bathroom stooping and bending and twisting with doubtful any focal weakness.    No contraindications to GLP-1 agonist.    Lexapro escitalopram 20 mg since early 2000 after some depression she is taking it Gisele is doing well wants to continue          Objective   Vital Signs:  /81   Pulse 62   Temp 98.2 °F (36.8 °C)   Ht 162 cm (63.78\")   Wt 94.3 kg (208 lb)   SpO2 97%   BMI 35.95 kg/m²   Estimated body mass index is 35.95 kg/m² as calculated from the following:    Height as of this encounter: 162 cm (63.78\").    Weight as of this encounter: 94.3 kg (208 lb).            Physical Exam  Vitals reviewed.   Constitutional:       General: She is not in acute distress.     Appearance: She is well-developed. She is not ill-appearing, toxic-appearing or diaphoretic.   HENT:      Head: Normocephalic.      Nose: Nose normal.      Mouth/Throat:      Pharynx: No oropharyngeal exudate.   Eyes:      General: No scleral icterus.     Conjunctiva/sclera: Conjunctivae normal.      Pupils: " Pupils are equal, round, and reactive to light.   Neck:      Thyroid: No thyromegaly.      Vascular: No JVD.   Cardiovascular:      Rate and Rhythm: Normal rate and regular rhythm.      Heart sounds: Normal heart sounds. No murmur heard.     No friction rub. No gallop.   Pulmonary:      Effort: Pulmonary effort is normal. No respiratory distress.      Breath sounds: Normal breath sounds. No stridor. No wheezing or rales.   Abdominal:      General: Bowel sounds are normal. There is no distension.      Palpations: Abdomen is soft. There is no mass.      Tenderness: There is no abdominal tenderness. There is no guarding.      Hernia: No hernia is present.      Comments: No hepatosplenomegaly, no ascites,   Musculoskeletal:         General: No tenderness.      Cervical back: Neck supple.   Lymphadenopathy:      Cervical: No cervical adenopathy.   Skin:     General: Skin is warm and dry.      Findings: No erythema or rash.   Neurological:      General: No focal deficit present.      Mental Status: She is alert and oriented to person, place, and time. Mental status is at baseline.      Deep Tendon Reflexes: Reflexes are normal and symmetric.   Psychiatric:         Behavior: Behavior normal.         Thought Content: Thought content normal.         Judgment: Judgment normal.        Result Review :                Assessment and Plan   Diagnoses and all orders for this visit:    1. Essential hypertension (Primary)    2. Carcinoma of right kidney, s/p nephrectomy    3. Renal transplant recipient    4. Postablative hypothyroidism    5. Type 2 diabetes mellitus with other circulatory complication, with long-term current use of insulin    6. PAD (peripheral artery disease)  Comments:  pre amp left  3,4,5 th anp toes    7. Decreased strength, endurance, and mobility    8. Mixed hyperlipidemia             Follow Up   No follow-ups on file.  Patient was given instructions and counseling regarding her condition or for health  maintenance advice. Please see specific information pulled into the AVS if appropriate.     Patient Instructions   Discharge instructions    Check blood pressure weekly should be less than 130/80 greater than 110/70  Always at least 64 ounces of water and fluids daily  Follow-up endocrinology but read about Ozempic and/or Wegovy  The former excellent medication for diabetes as well as frequently decreases appetite and frequently patients may lose weight slowly over time  Consider this as it may significantly improve her mobility especially considering you have neuropathy and a partial foot amputation, have these conversations with endocrinology and get their opinion and see if this is an option for you    Continue present medication,  COVID and flu shot at your local pharmacy.  If you have not had your shingles vaccine series Shingrix  After 6 weeks at your local pharmacy as well  At some point your RSV vaccine at your pharmacy    But as long as you are doing well,  And your blood pressures controlled and you are feeling good I will see you back in 6 months Medicare wellness at that time if you are due  Stay on top your mammograms and DEXA scans  If you need me to order these let me know otherwise your GYN I believe is doing this for you  Continue Lexapro 20 at some point we may consider slowly decreasing this if you want to do it before the next appointment let me know otherwise we will discuss this at next visit    -year-old-year-old adult acute as he was not sympathetic

## 2023-10-17 NOTE — PATIENT INSTRUCTIONS
Discharge instructions    Check blood pressure weekly should be less than 130/80 greater than 110/70  Always at least 64 ounces of water and fluids daily  Follow-up endocrinology but read about Ozempic and/or Wegovy  The former excellent medication for diabetes as well as frequently decreases appetite and frequently patients may lose weight slowly over time  Consider this as it may significantly improve her mobility especially considering you have neuropathy and a partial foot amputation, have these conversations with endocrinology and get their opinion and see if this is an option for you    Continue present medication,  COVID and flu shot at your local pharmacy.  If you have not had your shingles vaccine series Shingrix  After 6 weeks at your local pharmacy as well  At some point your RSV vaccine at your pharmacy    But as long as you are doing well,  And your blood pressures controlled and you are feeling good I will see you back in 6 months Medicare wellness at that time if you are due  Stay on top your mammograms and DEXA scans  If you need me to order these let me know otherwise your GYN I believe is doing this for you  Continue Lexapro 20 at some point we may consider slowly decreasing this if you want to do it before the next appointment let me know otherwise we will discuss this at next visit    -year-old-year-old adult acute as he was not sympathetic

## 2023-11-27 RX ORDER — ESCITALOPRAM OXALATE 20 MG/1
20 TABLET ORAL DAILY
Qty: 90 TABLET | Refills: 0 | Status: SHIPPED | OUTPATIENT
Start: 2023-11-27

## 2024-04-23 ENCOUNTER — TELEPHONE (OUTPATIENT)
Dept: FAMILY MEDICINE CLINIC | Facility: CLINIC | Age: 74
End: 2024-04-23

## 2024-04-23 NOTE — TELEPHONE ENCOUNTER
Caller: Diana Gee    Relationship to patient: Self    Best call back number: [phone]    Chief complaint: WELLNESS VISIT    Type of visit: WELLNESS VISIT    Requested date: ANYTIME AFTER 2:00     If rescheduling, when is the original appointment: 5/7

## 2024-04-25 NOTE — TELEPHONE ENCOUNTER
Called to schedule appt . Provider does not offer awv spots after 1:30. Pt can schedule with another provider who offers later awv    Hub to relay and schedule pt

## 2024-05-22 ENCOUNTER — OFFICE VISIT (OUTPATIENT)
Dept: CARDIOLOGY | Facility: CLINIC | Age: 74
End: 2024-05-22
Payer: MEDICARE

## 2024-05-22 VITALS
WEIGHT: 175.4 LBS | SYSTOLIC BLOOD PRESSURE: 110 MMHG | HEART RATE: 58 BPM | OXYGEN SATURATION: 98 % | DIASTOLIC BLOOD PRESSURE: 58 MMHG | HEIGHT: 64 IN | BODY MASS INDEX: 29.94 KG/M2

## 2024-05-22 DIAGNOSIS — Z94.0 RENAL TRANSPLANT RECIPIENT: ICD-10-CM

## 2024-05-22 DIAGNOSIS — I25.10 NONOBSTRUCTIVE ATHEROSCLEROSIS OF CORONARY ARTERY: ICD-10-CM

## 2024-05-22 DIAGNOSIS — R01.1 HEART MURMUR: Primary | ICD-10-CM

## 2024-05-22 DIAGNOSIS — I34.0 NONRHEUMATIC MITRAL VALVE REGURGITATION: ICD-10-CM

## 2024-05-22 DIAGNOSIS — I10 ESSENTIAL HYPERTENSION: ICD-10-CM

## 2024-05-22 DIAGNOSIS — I51.89 DIASTOLIC DYSFUNCTION: ICD-10-CM

## 2024-05-22 NOTE — PROGRESS NOTES
Date of Office Visit: 2024  Encounter Provider: ROMARIO Xiao  Place of Service: [facility]  Patient Name: Diana Gee  :1950  Primary Cardiologist: Dr. Ragini Capellan     Chief Complaint   Patient presents with    Annual Exam   :     HPI: Diana Gee is a 74 y.o. female who presents today for annual cardiac follow-up visit.  I have reviewed her medical records.    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  She then developed kidney cancer and her transplant failed.  In , she had a another kidney transplant.    She has a known history of type 2 diabetes mellitus, hepatitis C, DVT (), hypertension, hyperlipidemia, thyroid disease, and rheumatic fever as a child.  She has a pulmonary nodule followed by Dr. Magdiel Nye.      In May 2011, she had a heart catheterization which revealed mild nonobstructive coronary artery disease and normal filling pressures.      In 2020, she was having chest pain and dyspnea with exertion.  Echocardiogram showed normal LVEF, grade 1A diastolic dysfunction, left atrium moderately dilated, aortic valve sclerosis, mild mitral annular calcification, mild mitral regurgitation, and trace tricuspid regurgitation.  Cardiac PET scan was normal.    She presents today for an annual follow-up visit.  She continues to experience mild dyspnea when going up steps.  She has dizziness with bending over or quick positional changes.  She denies chest pain, palpitations, edema, syncope, or bleeding.  Blood pressure and heart rate are normal.  I reviewed her last blood work.      Past Medical History:   Diagnosis Date    Abnormal glucose     Acute bronchitis     Acute upper respiratory infection     Anemia     C. difficile diarrhea     [facility]    Carrier of viral hepatitis C     Declared viral free in   Dr Julio Serna  [facility]     Cellulitis of foot     Cerumen impaction     Charcot's joint     Depression with anxiety     Diabetes mellitus     Diastolic dysfunction     Disease of thyroid gland     Failed kidney transplant     Gastric ulcer     GERD (gastroesophageal reflux disease)     Gout     Graves disease     Healthcare-associated pneumonia 4/27/2021    Heart murmur     Hepatitis C     Hyperlipidemia     Hypertension     Hypothyroidism     Insomnia     Kidney carcinoma     Kidney failure     MR (mitral regurgitation)     moderate, with cunningham dysfctn, severe LAE, mod OLENA, aortic thickening without insuff- Dr Horn    Neuropathy     Osteomyelitis of toe     Osteoporosis     Peripheral edema     Pes planus     Subconjunctival hemorrhage     Transplant recipient 1995, 2011    BILATERAL KIDNEYS    Type 2 diabetes mellitus with diabetic mononeuropathy        Past Surgical History:   Procedure Laterality Date    AMPUTATION DIGIT Left 6/9/2016    Procedure: LT 4TH TOE AMPUTATION;  Surgeon: Navjot Gandara MD;  Location: [facility] OR OSC;  Service:     AMPUTATION DIGIT Left 12/15/2016    Procedure: LT THIRD TOE AMPUTATION;  Surgeon: Navjot Gandara MD;  Location: [facility] MAIN OR;  Service:     ARTERIOVENOUS FISTULA Bilateral 1992-LEFT ARM, 2008-RIGHT ARM    CHOLECYSTECTOMY  1992    COLONOSCOPY N/A 5/31/2019    Procedure: COLONOSCOPY WITHOUT PREP WITH BIOPSIES;  Surgeon: Cam Shin MD;  Location: [facility] ENDOSCOPY;  Service: Gastroenterology    EYE SURGERY Bilateral 1997, 1998    CATARACTS    JOINT REPLACEMENT Left 2001    SHOULDER    NEPHRECTOMY Right 2008    CANCER    SKIN GRAFT Right 2012    LEG    TOE AMPUTATION Left 2014    FIFTH TOE    TONSILLECTOMY  1964    TRANSPLANTATION RENAL Left 1995 2011-RIGHT KIDNEY TRANSPLANT    TUBAL ABDOMINAL LIGATION         Social History     Socioeconomic History    Marital status:      Spouse name: Calvin   Tobacco Use    Smoking status: Former     Current packs/day: 0.00     Average  packs/day: 0.5 packs/day for 4.0 years (2.0 ttl pk-yrs)     Types: Cigarettes     Start date: 1989     Quit date: 1993     Years since quittin.4    Smokeless tobacco: Never   Vaping Use    Vaping status: Never Used   Substance and Sexual Activity    Alcohol use: No     Comment: caffeine use: 1 cup 3 times wkly    Drug use: No    Sexual activity: Defer       Family History   Problem Relation Age of Onset    Hypothyroidism Mother     COPD Mother     COPD Father        The following portion of the patient's history were reviewed and updated as appropriate: past medical history, past surgical history, past social history, past family history, allergies, current medications, and problem list.    Review of Systems   Constitutional: Negative.   Cardiovascular: Negative.  Positive for dyspnea on exertion.   Respiratory: Negative.     Hematologic/Lymphatic: Negative.    Neurological: Negative.  Positive for dizziness.       Allergies   Allergen Reactions    Heparin Hives and Myalgia     Tremors,fever    Levofloxacin Hives and Anaphylaxis     Throat swelling no treatment given         Current Outpatient Medications:     Accu-Chek FastClix Lancets misc, , Disp: , Rfl:     Accu-Chek SmartView test strip, , Disp: , Rfl:     aspirin 81 MG EC tablet, Take 1 tablet by mouth Daily., Disp: , Rfl:     azaTHIOprine (IMURAN) 50 MG tablet, Take 1 tablet by mouth Daily. 3 tablets once daily, Disp: , Rfl:     carvedilol (COREG) 25 MG tablet, Take 1 tablet by mouth 2 (Two) Times a Day With Meals., Disp: , Rfl:     Cholecalciferol (VITAMIN D3) 5000 units tablet tablet, Take 1 tablet by mouth Daily., Disp: , Rfl:     escitalopram (LEXAPRO) 20 MG tablet, TAKE 1 TABLET DAILY, Disp: 90 tablet, Rfl: 0    esomeprazole (nexIUM) 20 MG capsule, Take 1 capsule by mouth Every Morning Before Breakfast., Disp: , Rfl:     glucose blood test strip, 1 each by Other route 2 (Two) Times a Day., Disp: , Rfl:     insulin detemir (LEVEMIR) 100  "UNIT/ML injection, Inject 10 Units under the skin into the appropriate area as directed Every Night., Disp: , Rfl:     insulin lispro (humaLOG) 100 UNIT/ML injection, Inject 4 Units under the skin into the appropriate area as directed Every Morning., Disp: , Rfl:     levothyroxine (SYNTHROID, LEVOTHROID) 125 MCG tablet, Take 1 tablet by mouth Daily., Disp: , Rfl:     magnesium oxide (MAG-OX) 400 MG tablet, Take 2 tablets by mouth 2 (Two) Times a Day., Disp: , Rfl:     metFORMIN ER (GLUCOPHAGE-XR) 500 MG 24 hr tablet, Take 750 mg by mouth Daily With Breakfast., Disp: , Rfl:     Multiple Vitamins-Minerals (MULTIVITAMIN WITH MINERALS) tablet tablet, Take 1 tablet by mouth Daily., Disp: , Rfl:     nystatin 744956 UNIT/GM topical powder, As Needed., Disp: , Rfl:     olmesartan (BENICAR) 5 MG tablet, Take 1 tablet by mouth Daily., Disp: , Rfl:     predniSONE (DELTASONE) 10 MG tablet, Take 2.5 mg by mouth Daily., Disp: , Rfl:     rosuvastatin (CRESTOR) 40 MG tablet, Take 1 tablet by mouth Daily., Disp: , Rfl:     tacrolimus (PROGRAF) 1 MG capsule, Take 1 capsule by mouth 2 (Two) Times a Day., Disp: , Rfl:          Objective:     Vitals:    05/22/24 1430   BP: 110/58   BP Location: Right arm   Patient Position: Sitting   Cuff Size: Large Adult   Pulse: 58   SpO2: 98%   Weight: 79.6 kg (175 lb 6.4 oz)   Height: 162 cm (63.78\")     Body mass index is 30.32 kg/m².    PHYSICAL EXAM:    Vitals Reviewed.   General Appearance: No acute distress, well developed and well nourished.   Eyes: Glasses.   HENT: No hearing loss noted.    Respiratory: No signs of respiratory distress. Respiration rhythm and depth normal.  Clear to auscultation.   Cardiovascular:  Jugular Venous Pressure: Normal  Heart Rate and Rhythm: Normal, Heart Sounds: Normal S1 and S2. No S3 or S4 noted.  Murmurs: RUSB grade 1/6 systolic murmur present.  Lower Extremities: No edema noted.  Musculoskeletal: Normal movement of extremities.  Skin: General appearance " normal.    Psychiatric: Patient alert and oriented to person, place, and time. Speech and behavior appropriate. Normal mood and affect.       ECG 12 Lead    Date/Time: 5/22/2024 2:31 PM  Performed by: Tawanna Schwarz APRN    Authorized by: Tawanna Schwarz APRN  Comparison: compared with previous ECG from 5/17/2023  Similar to previous ECG  Rhythm: sinus bradycardia  Rate: normal  BPM: 58  Conduction: conduction normal  ST Segments: ST segments normal  T Waves: T waves normal  QRS axis: normal    Clinical impression: normal ECG            Assessment:       Diagnosis Plan   1. Heart murmur  Adult Transthoracic Echo Complete W/ Cont if Necessary Per Protocol      2. Nonrheumatic mitral valve regurgitation  Adult Transthoracic Echo Complete W/ Cont if Necessary Per Protocol      3. Diastolic dysfunction        4. Nonobstructive atherosclerosis of coronary artery        5. Renal transplant recipient        6. Essential hypertension               Plan:       1/2.  She has a mild heart murmur from aortic sclerosis and mild mitral regurgitation.  Recheck echocardiogram at her convenience.    3.  Grade 1A diastolic dysfunction.  Well compensated today.    4.  Nonobstructive coronary artery disease noted per catheterization in May 2011.  Normal stress test in 2020.  Denies angina.  Continue aspirin and statin.    5.  Renal transplant recipient on 2 different occasions.    6.  Hypertension.  Blood pressure well-controlled.    7.  Type 2 diabetes mellitus.    8.  Overall, she is doing well from a cardiac standpoint.  She will follow-up with Dr. Capellan in 1 year.    As always, it has been a pleasure to participate in your patient's care. Thank you.         Sincerely,         ROMARIO Maldonado  [facility]      Dictated utilizing Dragon Dictation

## 2024-06-06 ENCOUNTER — HOSPITAL ENCOUNTER (OUTPATIENT)
Dept: CARDIOLOGY | Facility: HOSPITAL | Age: 74
Discharge: HOME | End: 2024-06-06
Admitting: NURSE PRACTITIONER
Payer: MEDICARE

## 2024-06-06 VITALS
DIASTOLIC BLOOD PRESSURE: 60 MMHG | WEIGHT: 174.16 LBS | BODY MASS INDEX: 29.73 KG/M2 | SYSTOLIC BLOOD PRESSURE: 130 MMHG | HEIGHT: 64 IN | HEART RATE: 65 BPM

## 2024-06-06 DIAGNOSIS — R01.1 HEART MURMUR: ICD-10-CM

## 2024-06-06 DIAGNOSIS — I34.0 NONRHEUMATIC MITRAL VALVE REGURGITATION: ICD-10-CM

## 2024-06-06 LAB
AORTIC DIMENSIONLESS INDEX: 0.8 (DI)
ASCENDING AORTA: 2.3 CM
BH CV ECHO LEFT VENTRICLE GLOBAL LONGITUDINAL STRAIN: -17.5 %
BH CV ECHO MEAS - ACS: 1.73 CM
BH CV ECHO MEAS - AI P1/2T: 684.4 MSEC
BH CV ECHO MEAS - AO MAX PG: 8.7 MMHG
BH CV ECHO MEAS - AO MEAN PG: 5.6 MMHG
BH CV ECHO MEAS - AO ROOT DIAM: 3.3 CM
BH CV ECHO MEAS - AO V2 MAX: 147.7 CM/SEC
BH CV ECHO MEAS - AO V2 VTI: 40 CM
BH CV ECHO MEAS - AVA(I,D): 2.35 CM2
BH CV ECHO MEAS - EDV(CUBED): 73.2 ML
BH CV ECHO MEAS - EDV(MOD-SP2): 92 ML
BH CV ECHO MEAS - EDV(MOD-SP4): 110 ML
BH CV ECHO MEAS - EF(MOD-BP): 66.4 %
BH CV ECHO MEAS - EF(MOD-SP2): 68.5 %
BH CV ECHO MEAS - EF(MOD-SP4): 67.3 %
BH CV ECHO MEAS - ESV(CUBED): 23.7 ML
BH CV ECHO MEAS - ESV(MOD-SP2): 29 ML
BH CV ECHO MEAS - ESV(MOD-SP4): 36 ML
BH CV ECHO MEAS - FS: 31.3 %
BH CV ECHO MEAS - IVS/LVPW: 1.15 CM
BH CV ECHO MEAS - IVSD: 1.48 CM
BH CV ECHO MEAS - LAT PEAK E' VEL: 7.2 CM/SEC
BH CV ECHO MEAS - LV DIASTOLIC VOL/BSA (35-75): 59.8 CM2
BH CV ECHO MEAS - LV MASS(C)D: 219.2 GRAMS
BH CV ECHO MEAS - LV MAX PG: 8.4 MMHG
BH CV ECHO MEAS - LV MEAN PG: 4.3 MMHG
BH CV ECHO MEAS - LV SYSTOLIC VOL/BSA (12-30): 19.6 CM2
BH CV ECHO MEAS - LV V1 MAX: 145.1 CM/SEC
BH CV ECHO MEAS - LV V1 VTI: 34.5 CM
BH CV ECHO MEAS - LVIDD: 4.2 CM
BH CV ECHO MEAS - LVIDS: 2.9 CM
BH CV ECHO MEAS - LVOT AREA: 2.7 CM2
BH CV ECHO MEAS - LVOT DIAM: 1.86 CM
BH CV ECHO MEAS - LVPWD: 1.29 CM
BH CV ECHO MEAS - MED PEAK E' VEL: 4.2 CM/SEC
BH CV ECHO MEAS - MR MAX PG: 146.6 MMHG
BH CV ECHO MEAS - MR MAX VEL: 605.4 CM/SEC
BH CV ECHO MEAS - MV A DUR: 0.16 SEC
BH CV ECHO MEAS - MV A MAX VEL: 96.1 CM/SEC
BH CV ECHO MEAS - MV DEC SLOPE: 529.5 CM/SEC2
BH CV ECHO MEAS - MV DEC TIME: 0.15 SEC
BH CV ECHO MEAS - MV E MAX VEL: 95.5 CM/SEC
BH CV ECHO MEAS - MV E/A: 0.99
BH CV ECHO MEAS - MV MAX PG: 7.2 MMHG
BH CV ECHO MEAS - MV MEAN PG: 2.5 MMHG
BH CV ECHO MEAS - MV P1/2T: 73.4 MSEC
BH CV ECHO MEAS - MV V2 VTI: 39.4 CM
BH CV ECHO MEAS - MVA(P1/2T): 3 CM2
BH CV ECHO MEAS - MVA(VTI): 2.38 CM2
BH CV ECHO MEAS - PA ACC TIME: 0.11 SEC
BH CV ECHO MEAS - PA V2 MAX: 107.1 CM/SEC
BH CV ECHO MEAS - PI END-D VEL: 88.2 CM/SEC
BH CV ECHO MEAS - PULM A REVS DUR: 0.13 SEC
BH CV ECHO MEAS - PULM A REVS VEL: 22.9 CM/SEC
BH CV ECHO MEAS - PULM DIAS VEL: 27.9 CM/SEC
BH CV ECHO MEAS - PULM S/D: 0.86
BH CV ECHO MEAS - PULM SYS VEL: 24 CM/SEC
BH CV ECHO MEAS - QP/QS: 0.63
BH CV ECHO MEAS - RAP SYSTOLE: 8 MMHG
BH CV ECHO MEAS - RV MAX PG: 3.5 MMHG
BH CV ECHO MEAS - RV V1 MAX: 93.6 CM/SEC
BH CV ECHO MEAS - RV V1 VTI: 22.8 CM
BH CV ECHO MEAS - RVOT DIAM: 1.82 CM
BH CV ECHO MEAS - RVSP: 52 MMHG
BH CV ECHO MEAS - SV(LVOT): 93.8 ML
BH CV ECHO MEAS - SV(MOD-SP2): 63 ML
BH CV ECHO MEAS - SV(MOD-SP4): 74 ML
BH CV ECHO MEAS - SV(RVOT): 59.3 ML
BH CV ECHO MEAS - SVI(LVOT): 51 ML/M2
BH CV ECHO MEAS - SVI(MOD-SP2): 34.2 ML/M2
BH CV ECHO MEAS - SVI(MOD-SP4): 40.2 ML/M2
BH CV ECHO MEAS - TR MAX PG: 43.6 MMHG
BH CV ECHO MEAS - TR MAX VEL: 330.1 CM/SEC
BH CV ECHO MEASUREMENTS AVERAGE E/E' RATIO: 16.75
BH CV XLRA - RV BASE: 3.5 CM
BH CV XLRA - RV LENGTH: 6.5 CM
BH CV XLRA - RV MID: 3.9 CM
BH CV XLRA - TDI S': 9.9 CM/SEC
LEFT ATRIUM VOLUME INDEX: 43.2 ML/M2
SINUS: 3 CM
STJ: 2.25 CM

## 2024-06-06 PROCEDURE — 93306 TTE W/DOPPLER COMPLETE: CPT | Performed by: STUDENT IN AN ORGANIZED HEALTH CARE EDUCATION/TRAINING PROGRAM

## 2024-06-06 PROCEDURE — 93306 TTE W/DOPPLER COMPLETE: CPT

## 2024-06-08 NOTE — PROGRESS NOTES
Reviewed.  EF normal, grade 2 diastolic dysfunction, right ventricle borderline dilated, left atrium moderately dilated, trace to mild AI, mild to moderate MR, mild to moderate TR, and moderate pulmonary hypertension.    I recently saw her for dyspnea when going up steps.  She was noted to have a mild heart murmur.  She is status post renal transplant.  I discussed with her if she feels that she needs diuretic therapy or if we will just monitor her shortness of breath.  I also question if she has sleep apnea?

## 2024-06-10 ENCOUNTER — TELEPHONE (OUTPATIENT)
Dept: CARDIOLOGY | Facility: CLINIC | Age: 74
End: 2024-06-10
Payer: MEDICARE

## 2024-06-10 DIAGNOSIS — I34.0 NONRHEUMATIC MITRAL VALVE REGURGITATION: ICD-10-CM

## 2024-06-10 DIAGNOSIS — I51.89 GRADE II DIASTOLIC DYSFUNCTION: ICD-10-CM

## 2024-06-10 DIAGNOSIS — I36.1 NONRHEUMATIC TRICUSPID VALVE REGURGITATION: ICD-10-CM

## 2024-06-10 DIAGNOSIS — I27.20 PULMONARY HYPERTENSION: Primary | ICD-10-CM

## 2024-06-10 RX ORDER — ESCITALOPRAM OXALATE 20 MG/1
TABLET ORAL
Qty: 90 TABLET | Refills: 2 | Status: SHIPPED | OUTPATIENT
Start: 2024-06-10

## 2024-06-10 NOTE — TELEPHONE ENCOUNTER
Rx Refill Note  Requested Prescriptions     Pending Prescriptions Disp Refills    escitalopram (LEXAPRO) 20 MG tablet [Pharmacy Med Name: ESCITALOPRAM OXALATE 20MG TABS] 90 tablet 0     Sig: To be filled by Provider      Last office visit with prescribing clinician: 10/17/2023   Last telemedicine visit with prescribing clinician: Visit date not found   Next office visit with prescribing clinician: 7/5/2024                         Would you like a call back once the refill request has been completed: [] Yes [] No    If the office needs to give you a call back, can they leave a voicemail: [] Yes [] No    Leta Coffey MA  06/10/24, 08:26 EDT

## 2024-06-10 NOTE — PROGRESS NOTES
I discussed the results with her via phone.  She verbalized understanding.  At this time, I decided to hold off on diuretic therapy because she is status post renal transplant and she is not having dyspnea with her daily activities, only steps.  I am suspicious about sleep apnea and she snores at nighttime.  I placed a referral to sleep medicine.  She verbalized understanding.  I recommended a 6-month follow-up visit with Dr. Capellan.

## 2024-06-10 NOTE — TELEPHONE ENCOUNTER
I recently saw her in the office and noted a heart murmur.  She reported dyspnea when going up steps only.  She is status post renal transplant.    Reviewed echocardiogram.  EF normal, grade 2 diastolic dysfunction, right ventricle borderline dilated, left atrium moderately dilated, trace to mild AI, mild to moderate MR, mild to moderate TR, and moderate pulmonary hypertension.     I discussed the results with her via phone.  She verbalized understanding.  At this time, I decided to hold off on diuretic therapy because she is status post renal transplant and she is not having dyspnea with her daily activities, only steps.  I am suspicious about sleep apnea and she snores at nighttime.  I placed a referral to sleep medicine.  She verbalized understanding.

## 2024-06-26 NOTE — TELEPHONE ENCOUNTER
Caller: Diana Gee    Relationship: Self    Best call back number: [phone]     Requested Prescriptions:   Requested Prescriptions     Pending Prescriptions Disp Refills    escitalopram (LEXAPRO) 20 MG tablet 90 tablet 2        Pharmacy where request should be sent: [facility], [address] [phone] [facility] [phone]      Last office visit with prescribing clinician: 10/17/2023   Last telemedicine visit with prescribing clinician: Visit date not found   Next office visit with prescribing clinician: 7/5/2024     Additional details provided by patient: PATIENT HAS 3 DAYS LEFT.     Does the patient have less than a 3 day supply:  [x] Yes  [] No    Would you like a call back once the refill request has been completed: [] Yes [] No    If the office needs to give you a call back, can they leave a voicemail: [] Yes [] No    Christal Post   06/26/24 12:47 EDT

## 2024-06-27 RX ORDER — ESCITALOPRAM OXALATE 20 MG/1
20 TABLET ORAL DAILY
Qty: 90 TABLET | Refills: 2 | Status: SHIPPED | OUTPATIENT
Start: 2024-06-27

## 2024-06-27 NOTE — TELEPHONE ENCOUNTER
Rx Refill Note  Requested Prescriptions     Pending Prescriptions Disp Refills    escitalopram (LEXAPRO) 20 MG tablet 90 tablet 2      Last office visit with prescribing clinician: 10/17/2023   Last telemedicine visit with prescribing clinician: Visit date not found   Next office visit with prescribing clinician: 7/5/2024                         Would you like a call back once the refill request has been completed: [] Yes [] No    If the office needs to give you a call back, can they leave a voicemail: [] Yes [] No    Leta Coffey MA  06/27/24, 07:55 EDT

## 2024-07-05 ENCOUNTER — OFFICE VISIT (OUTPATIENT)
Dept: FAMILY MEDICINE CLINIC | Facility: CLINIC | Age: 74
End: 2024-07-05
Payer: MEDICARE

## 2024-07-05 VITALS
DIASTOLIC BLOOD PRESSURE: 75 MMHG | BODY MASS INDEX: 29.74 KG/M2 | SYSTOLIC BLOOD PRESSURE: 155 MMHG | HEART RATE: 64 BPM | OXYGEN SATURATION: 97 % | HEIGHT: 64 IN | WEIGHT: 174.2 LBS | RESPIRATION RATE: 16 BRPM

## 2024-07-05 DIAGNOSIS — Z12.11 SCREEN FOR COLON CANCER: ICD-10-CM

## 2024-07-05 DIAGNOSIS — Z00.00 MEDICARE ANNUAL WELLNESS VISIT, SUBSEQUENT: Primary | ICD-10-CM

## 2024-07-05 DIAGNOSIS — Z78.0 POST-MENOPAUSAL: ICD-10-CM

## 2024-07-05 DIAGNOSIS — D25.9 UTERINE LEIOMYOMA, UNSPECIFIED LOCATION: ICD-10-CM

## 2024-07-05 DIAGNOSIS — Z12.31 ENCOUNTER FOR SCREENING MAMMOGRAM FOR BREAST CANCER: ICD-10-CM

## 2024-07-05 DIAGNOSIS — I73.9 PAD (PERIPHERAL ARTERY DISEASE): ICD-10-CM

## 2024-07-05 RX ORDER — INSULIN LISPRO 100 [IU]/ML
INJECTION, SOLUTION INTRAVENOUS; SUBCUTANEOUS
COMMUNITY
Start: 2024-06-04

## 2024-07-05 RX ORDER — CEPHALEXIN 500 MG/1
CAPSULE ORAL
COMMUNITY
Start: 2024-05-24

## 2024-07-05 RX ORDER — METFORMIN HYDROCHLORIDE 750 MG/1
750 TABLET, EXTENDED RELEASE ORAL
COMMUNITY
Start: 2024-05-24 | End: 2025-05-24

## 2024-07-05 RX ORDER — PEN NEEDLE, DIABETIC 32GX 5/32"
NEEDLE, DISPOSABLE MISCELLANEOUS
COMMUNITY
Start: 2024-05-24

## 2024-07-05 NOTE — PROGRESS NOTES
The ABCs of the Annual Wellness Visit  Subsequent Medicare Wellness Visit    Subjective    Diana Gee is a 74 y.o. female who presents for a Subsequent Medicare Wellness Visit.    The following portions of the patient's history were reviewed and   updated as appropriate: allergies, current medications, past family history, past medical history, past social history, past surgical history, and problem list.    Compared to one year ago, the patient feels her physical   health is the same.    Compared to one year ago, the patient feels her mental   health is the same.    Recent Hospitalizations:  She was not admitted to the hospital during the last year.       Current Medical Providers:  Patient Care Team:  Epley, James, APRN as PCP - General (Nurse Practitioner)  Alberto Myers MD as Referring Physician (Urology)  Yanni Saucedo MD as Consulting Physician (Hematology and Oncology)  Dinora Garcia MD as Consulting Physician (Endocrinology)  Ragini Capellan MD as Consulting Physician (Cardiology)  Davis Pringle MD (Nephrology)    Outpatient Medications Prior to Visit   Medication Sig Dispense Refill    Accu-Chek FastClix Lancets misc       Accu-Chek SmartView test strip       aspirin 81 MG EC tablet Take 1 tablet by mouth Daily.      azaTHIOprine (IMURAN) 50 MG tablet Take 1 tablet by mouth Daily. 3 tablets once daily      BD Pen Needle Paula 2nd Gen 32G X 4 MM misc       carvedilol (COREG) 25 MG tablet Take 1 tablet by mouth 2 (Two) Times a Day With Meals.      cephalexin (KEFLEX) 500 MG capsule       Cholecalciferol (VITAMIN D3) 5000 units tablet tablet Take 1 tablet by mouth Daily.      escitalopram (LEXAPRO) 20 MG tablet Take 1 tablet by mouth Daily. 90 tablet 2    esomeprazole (nexIUM) 20 MG capsule Take 1 capsule by mouth Every Morning Before Breakfast.      glucose blood test strip 1 each by Other route 2 (Two) Times a Day.      insulin detemir (LEVEMIR) 100 UNIT/ML injection  Inject 10 Units under the skin into the appropriate area as directed Every Night.      insulin lispro (humaLOG) 100 UNIT/ML injection Inject 4 Units under the skin into the appropriate area as directed Every Morning.      Insulin Lispro, 1 Unit Dial, (HUMALOG) 100 UNIT/ML solution pen-injector       levothyroxine (SYNTHROID, LEVOTHROID) 125 MCG tablet Take 1 tablet by mouth Daily.      magnesium oxide (MAG-OX) 400 MG tablet Take 2 tablets by mouth 2 (Two) Times a Day.      metFORMIN ER (GLUCOPHAGE-XR) 500 MG 24 hr tablet Take 750 mg by mouth Daily With Breakfast.      metFORMIN ER (GLUCOPHAGE-XR) 750 MG 24 hr tablet Take 1 tablet by mouth.      Multiple Vitamins-Minerals (MULTIVITAMIN WITH MINERALS) tablet tablet Take 1 tablet by mouth Daily.      nystatin 471089 UNIT/GM topical powder As Needed.      olmesartan (BENICAR) 5 MG tablet Take 1 tablet by mouth Daily.      rosuvastatin (CRESTOR) 40 MG tablet Take 1 tablet by mouth Daily.      tacrolimus (PROGRAF) 1 MG capsule Take 1 capsule by mouth 2 (Two) Times a Day.      predniSONE (DELTASONE) 10 MG tablet Take 2.5 mg by mouth Daily. (Patient not taking: Reported on 7/5/2024)       No facility-administered medications prior to visit.       No opioid medication identified on active medication list. I have reviewed chart for other potential  high risk medication/s and harmful drug interactions in the elderly.        Aspirin is on active medication list. Aspirin use is indicated based on review of current medical condition/s. Pros and cons of this therapy have been discussed today. Benefits of this medication outweigh potential harm.  Patient has been encouraged to continue taking this medication.  .      Patient Active Problem List   Diagnosis    Type 2 diabetes mellitus    Essential hypertension    Hyperlipidemia    Renal transplant recipient    Hepatitis C, treated    Carcinoma of right kidney, s/p nephrectomy    Neuropathy    Immunocompromised    Neutropenia     "Anemia    Charcot's arthropathy    Mixed anxiety depressive disorder    Insomnia    Mitral valve insufficiency    Postablative hypothyroidism    Pulmonary nodule    Diastolic dysfunction    Graves' disease    Nonobstructive atherosclerosis of coronary artery    Diarrhea due to drug    Healthcare associated bacterial pneumonia    Hypomagnesemia    Hypophosphatemia    Incisional hernia    PAD (peripheral artery disease)    Decreased strength, endurance, and mobility    Pulmonary hypertension    Nonrheumatic tricuspid valve regurgitation     Advance Care Planning   Advance Care Planning     Advance Directive is not on file.  ACP discussion was held with the patient during this visit. Patient does not have an advance directive, declines further assistance.     Objective    Vitals:    24 1324   BP: 155/75   Pulse: 64   Resp: 16   SpO2: 97%   Weight: 79 kg (174 lb 3.2 oz)   Height: 162 cm (63.78\")     Estimated body mass index is 30.11 kg/m² as calculated from the following:    Height as of this encounter: 162 cm (63.78\").    Weight as of this encounter: 79 kg (174 lb 3.2 oz).    BMI is >= 30 and <35. (Class 1 Obesity). The following options were offered after discussion;: exercise counseling/recommendations and nutrition counseling/recommendations      Does the patient have evidence of cognitive impairment? No          HEALTH RISK ASSESSMENT    Smoking Status:  Social History     Tobacco Use   Smoking Status Former    Current packs/day: 0.00    Average packs/day: 0.5 packs/day for 4.0 years (2.0 ttl pk-yrs)    Types: Cigarettes    Start date: 1989    Quit date: 1993    Years since quittin.5   Smokeless Tobacco Never     Alcohol Consumption:  Social History     Substance and Sexual Activity   Alcohol Use No    Comment: caffeine use: 1 cup 3 times wkly     Fall Risk Screen:    STEADI Fall Risk Assessment has not been completed.    Depression Screenin/2/2023     3:41 PM   PHQ-2/PHQ-9 Depression " Screening   Little Interest or Pleasure in Doing Things 0-->not at all   Feeling Down, Depressed or Hopeless 0-->not at all   PHQ-9: Brief Depression Severity Measure Score 0       Health Habits and Functional and Cognitive Screenin/5/2024     1:23 PM   Functional & Cognitive Status   Do you have difficulty preparing food and eating? No   Do you have difficulty bathing yourself, getting dressed or grooming yourself? No   Do you have difficulty using the toilet? No   Do you have difficulty moving around from place to place? No   Do you have trouble with steps or getting out of a bed or a chair? No   Current Diet Well Balanced Diet   Dental Exam Up to date   Eye Exam Up to date   Exercise (times per week) 7 times per week   Current Exercises Include Walking   Do you need help using the phone?  No   Are you deaf or do you have serious difficulty hearing?  No   Do you need help to go to places out of walking distance? No   Do you need help shopping? No   Do you need help preparing meals?  No   Do you need help with housework?  No   Do you need help with laundry? No   Do you need help taking your medications? No   Do you need help managing money? No   Do you ever drive or ride in a car without wearing a seat belt? No   Have you felt unusual stress, anger or loneliness in the last month? No   Who do you live with? Spouse   If you need help, do you have trouble finding someone available to you? No   Have you been bothered in the last four weeks by sexual problems? No   Do you have difficulty concentrating, remembering or making decisions? No       Age-appropriate Screening Schedule:  Refer to the list below for future screening recommendations based on patient's age, sex and/or medical conditions. Orders for these recommended tests are listed in the plan section. The patient has been provided with a written plan.    Health Maintenance   Topic Date Due    MAMMOGRAM  Never done    TDAP/TD VACCINES (1 - Tdap) Never  done    DXA SCAN  08/13/2021    DIABETIC FOOT EXAM  04/14/2022    ANNUAL WELLNESS VISIT  05/02/2024    BMI FOLLOWUP  05/02/2024    HEMOGLOBIN A1C  08/19/2024    COVID-19 Vaccine (5 - 2023-24 season) 09/24/2024 (Originally 9/1/2023)    ZOSTER VACCINE (1 of 2) 10/17/2024 (Originally 3/31/1969)    RSV Vaccine - Adults (1 - 1-dose 60+ series) 07/05/2025 (Originally 3/31/2012)    INFLUENZA VACCINE  08/01/2024    DIABETIC EYE EXAM  10/27/2024    LIPID PANEL  02/19/2025    URINE MICROALBUMIN  05/22/2025    COLORECTAL CANCER SCREENING  05/31/2029    HEPATITIS C SCREENING  Completed    Pneumococcal Vaccine 65+  Completed    Hepatitis B  Aged Out                  CMS Preventative Services Quick Reference  Risk Factors Identified During Encounter  Fall Risk-High or Moderate: Discussed Fall Prevention in the home  The above risks/problems have been discussed with the patient.  Pertinent information has been shared with the patient in the After Visit Summary.  An After Visit Summary and PPPS were made available to the patient.    Follow Up:   Next Medicare Wellness visit to be scheduled in 1 year.       Additional E&M Note during same encounter follows:  Patient has multiple medical problems which are significant and separately identifiable that require additional work above and beyond the Medicare Wellness Visit.      Chief Complaint  Medicare Wellness-subsequent (Annual wellness check )    Subjective        Very pleasant patient here today for Medicare wellness as well as follow-up essential hypertension controlled checks her blood pressure at home pretty regularly and generally runs around 130s sometimes a little higher but usually right around that area is controlled nicely  History of PAD stable but had not had a follow-up in a couple years renal transplant 2011 takes good care of her kidneys hydrates well never naproxen, up-to-date with her specialist,  CAD stable nonobstructive up-to-date with cardiology takes aspirin  "daily statin daily hypertension essential controlled nicely,   sees endocrinology as well, she will have a conversation at her next visit soon on whether Ozempic may or may not be an option for her just to help her get to her goal is a little better for quality life  No contraindications to this no family history of medullary thyroid cancer multiple endocrine dysplasia and syndrome type II  Do a mammogram DEXA scan her GYN, retired did have a history of fibroids, with still getting Pap smears,  Low risk,  monogamous,      Social history , no drug alcohol tobacco abuse,  Past medical history as above,  Including Graves' disease, with replacement therapy, for thyroid,    Diana Gee is also being seen today for see hpi      Review of Systems   Constitutional: Negative.    HENT: Negative.     Respiratory: Negative.     Cardiovascular: Negative.    Gastrointestinal: Negative.    Endocrine: Negative.    Musculoskeletal: Negative.    Skin: Negative.    Allergic/Immunologic: Negative.    Neurological: Negative.  Negative for facial asymmetry.   Hematological: Negative.    All other systems reviewed and are negative.      Objective   Vital Signs:  /75   Pulse 64   Resp 16   Ht 162 cm (63.78\")   Wt 79 kg (174 lb 3.2 oz)   SpO2 97%   BMI 30.11 kg/m²     Physical Exam  Vitals reviewed.   Constitutional:       General: She is not in acute distress.     Appearance: Normal appearance. She is well-developed. She is not ill-appearing, toxic-appearing or diaphoretic.   HENT:      Head: Normocephalic.      Nose: Nose normal.   Eyes:      General: No scleral icterus.     Conjunctiva/sclera: Conjunctivae normal.      Pupils: Pupils are equal, round, and reactive to light.   Neck:      Thyroid: No thyromegaly.      Vascular: No JVD.      Comments: Carotids clear no bruit or thrill, thyroid normal without mass or nodule  Cardiovascular:      Rate and Rhythm: Normal rate and regular rhythm.      Heart " sounds: Normal heart sounds. No murmur heard.     No friction rub. No gallop.   Pulmonary:      Effort: Pulmonary effort is normal. No respiratory distress.      Breath sounds: Normal breath sounds. No stridor. No wheezing or rales.   Abdominal:      General: Bowel sounds are normal. There is no distension.      Palpations: Abdomen is soft.      Tenderness: There is no abdominal tenderness.      Comments: No hepatosplenomegaly, no ascites,   Musculoskeletal:         General: No tenderness.      Cervical back: Neck supple.   Lymphadenopathy:      Cervical: No cervical adenopathy.   Skin:     General: Skin is warm and dry.      Findings: No erythema or rash.   Neurological:      Mental Status: She is alert and oriented to person, place, and time.      Deep Tendon Reflexes: Reflexes are normal and symmetric.   Psychiatric:         Behavior: Behavior normal.         Thought Content: Thought content normal.         Judgment: Judgment normal.                         Assessment and Plan   Diagnoses and all orders for this visit:    1. Medicare annual wellness visit, subsequent (Primary)    2. Encounter for screening mammogram for breast cancer  -     Mammo Screening Bilateral With CAD    3. Post-menopausal  -     DEXA Bone Density Axial    4. Uterine leiomyoma, unspecified location  -     Ambulatory Referral to Gynecology    5. PAD (peripheral artery disease)  -     Ambulatory Referral to Vascular Surgery    6. Screen for colon cancer  -     Ambulatory Referral For Screening Colonoscopy             Follow Up   Return in about 6 months (around 1/5/2025) for Print discharge instructions/educational handouts.  Patient was given instructions and counseling regarding her condition or for health maintenance advice. Please see specific information pulled into the AVS if appropriate.     Encouraged to consider Ozempic  For improved diabetes control and possibly some help with weight loss  Continue present medication no  change  Continue Lexapro etc. plan for anxiety  Continue levothyroxine replacement therapy  She will follow-up with her kidney specialist as well, falls precaution consider  Living will she has already made her wishes known well to her family and her  she prefers not at this time,  Discussed  Checking blood pressure weekly she is good with this,  Need to hydrate protect kidneys she is very good,  Safety immunizations, diabetic foot checks,  Need to follow-up with vascular need carotid aorta evaluation let me know if I need to see a screening vascular may do this but if I need to send a screen she will let me know CAD stable, CT calcium score likely little benefit she is already      On statin aspirin and blood pressure is controlled she is seeing cardiology for known CAD  She has no chest complaints  Discharge instructions    I think you are okay to get or if you have not had Shingrix x 2  It is not a live virus you are at increased risk smart move likely consider RSV vaccine      Tdap vaccine COVID and flu shots when available  Keep your appointment with endocrinology    Encourage you to see if Ozempic may be beneficial for your metabolic and diabetes needs    Vascular referral follow-up peripheral artery disease you probably need a scan of your aorta and carotid, if this has not been done let me know and we will do it by screening  Outpatient of your carotid and abdominal $30 each but see vascular first and see if they need to do this diagnostically      Falls precaution,  As long as you are doing well and feeling good and your blood pressure control check weekly should be less than 130/80  If it is trending closer to 140 we should adjust it if it is chronic  You will need a pelvic ultrasound as well let me know if I need to order this for you follow-up with nephrology regarding any scar tissue with your previous kidney but if there is anything I need to get a CAT scan for you for pelvic mass excetra please  let me know I will forward but otherwise have these discussions.

## 2024-07-05 NOTE — PATIENT INSTRUCTIONS
Discharge instructions    I think you are okay to get or if you have not had Shingrix x 2  It is not a live virus you are at increased risk smart move likely consider RSV vaccine      Tdap vaccine COVID and flu shots when available  Keep your appointment with endocrinology    Encourage you to see if Ozempic may be beneficial for your metabolic and diabetes needs    Vascular referral follow-up peripheral artery disease you probably need a scan of your aorta and carotid, if this has not been done let me know and we will do it by screening  Outpatient of your carotid and abdominal $30 each but see vascular first and see if they need to do this diagnostically      Falls precaution,  As long as you are doing well and feeling good and your blood pressure control check weekly should be less than 130/80  If it is trending closer to 140 we should adjust it if it is chronic  You will need a pelvic ultrasound as well let me know if I need to order this for you follow-up with nephrology regarding any scar tissue with your previous kidney but if there is anything I need to get a CAT scan for you for pelvic mass excetra please let me know I will forward but otherwise have these discussions.

## 2024-07-26 ENCOUNTER — OFFICE VISIT (OUTPATIENT)
Age: 74
End: 2024-07-26
Payer: MEDICARE

## 2024-07-26 VITALS
HEART RATE: 61 BPM | WEIGHT: 174 LBS | DIASTOLIC BLOOD PRESSURE: 74 MMHG | BODY MASS INDEX: 29.71 KG/M2 | SYSTOLIC BLOOD PRESSURE: 124 MMHG | HEIGHT: 64 IN

## 2024-07-26 DIAGNOSIS — I73.9 PERIPHERAL ARTERIAL DISEASE: Primary | ICD-10-CM

## 2024-07-26 DIAGNOSIS — R22.43 LOCALIZED SWELLING OF BOTH LOWER LEGS: ICD-10-CM

## 2024-07-26 NOTE — PROGRESS NOTES
"Chief Complaint  Peripheral Vascular Disease    New patient referral for peripheral arterial disease.    Subjective        Diana Gee presents to [facility]  History of Present Illness    Patient is a very pleasant 74-year-old lady with a history of diabetes, end-stage renal disease status post kidney transplant x 2, initially in 1995 and then was retransplanted in 2011, with diabetic neuropathy causing numbness and sensitivity in both of her feet who was referred to be evaluated for peripheral arterial disease.  The patient is also developed some bilateral lower extremity edema as well.    She denies any pain in her legs with ambulation, only some increase sensitivity in her feet which she attributes to her diabetic neuropathy.      She has previously had toes amputated on the left foot.    No acute complaints today.    Objective   Vital Signs:  /74   Pulse 61   Ht 162 cm (63.78\")   Wt 78.9 kg (174 lb)   BMI 30.07 kg/m²   Estimated body mass index is 30.07 kg/m² as calculated from the following:    Height as of this encounter: 162 cm (63.78\").    Weight as of this encounter: 78.9 kg (174 lb).                      Physical Exam   NAD  NCAT  Mild  pitting edema bilateral lower legs, hyperkeratosis or hemosiderin deposition noted  Left foot toe amputation site completely healed.  Multiphasic DP signals bilaterally  There is a very small ulceration on the dorsum of her right first toe where the patient trimmed her toenails too short, no erythema, induration, drainage.    Result Review :          ABIs from 2020 at [facility]:           Assessment and Plan       74-year-old lady with longstanding history of diabetes and end-stage renal disease status post 2 previous kidney transplants here to be evaluated for peripheral arterial disease.  She does not have any symptoms of claudication.  Noted to have significant medial calcinosis, noncompressible arteries on ABIs 4 " years ago but waveforms and toe pressures suggested normal perfusion.  She has multiphasic signals here and I suspect she still has pretty good perfusion, should be good enough to heal any wounds.   Patient is treating her right first toe ulceration with Neosporin.  I encouraged her to continue using this.  I discussed with her the briskness of any wounds on her feet with her history of diabetes and immunocompromise status.  She verbalized understanding of this.  I will plan to see the patient back in 1 month with ABIs with digit pressures and lower extremity venous duplex with reflux.  If perfusion still appears reasonable we will begin compression therapy at that time.        Diagnoses and all orders for this visit:    1. Peripheral arterial disease (Primary)  -     Doppler Arterial Multi Level Lower Extremity - Bilateral CAR; Future    2. Localized swelling of both lower legs  -     Duplex Venous Lower Extremity - Bilateral CAR; Future              Patient BMI noted. Educational material for patient for health risks of being overweight added to patient's after visit summary.           Follow Up     Return in about 4 weeks (around 8/23/2024).  Patient was given instructions and counseling regarding her condition or for health maintenance advice. Please see specific information pulled into the AVS if appropriate.

## 2024-07-29 ENCOUNTER — TELEPHONE (OUTPATIENT)
Dept: CARDIOLOGY | Facility: CLINIC | Age: 74
End: 2024-07-29
Payer: MEDICARE

## 2024-07-29 NOTE — TELEPHONE ENCOUNTER
In June 2024, I placed a referral for [facility] sleep medicine.  Looks like patient had a recent appointment and canceled it.  Does she plan to reschedule?  Thank you

## 2024-07-30 NOTE — TELEPHONE ENCOUNTER
Left voicemail for Diana Gee requesting callback.    HUB: please transfer to Triage if patient returns call    Thank you,  Amy SON RN  Triage Nurse [facility]   08:44 EDT

## 2024-07-31 NOTE — TELEPHONE ENCOUNTER
Patient states she does not plan on rescheduling at this time. She said she does not think she has GRAY and she does not feel comfortable finding out if she does.     Shabnam Pitts RN  Triage MG

## 2024-07-31 NOTE — TELEPHONE ENCOUNTER
Left voicemail for Diana Gee requesting callback.    HUB: please transfer to Triage if patient returns call    Thank you,  Amy SON RN  Triage Nurse [facility] [place]   08:57 EDT

## 2024-08-05 ENCOUNTER — HOSPITAL ENCOUNTER (OUTPATIENT)
Dept: CARDIOLOGY | Facility: HOSPITAL | Age: 74
Discharge: HOME | End: 2024-08-05
Payer: MEDICARE

## 2024-08-05 DIAGNOSIS — R22.43 LOCALIZED SWELLING OF BOTH LOWER LEGS: ICD-10-CM

## 2024-08-05 DIAGNOSIS — I73.9 PERIPHERAL ARTERIAL DISEASE: ICD-10-CM

## 2024-08-05 LAB
BH CV LOWER ARTERIAL LEFT 2ND DIGIT SYS MAX: 144
BH CV LOWER ARTERIAL LEFT 3RD DIGIT SYS MAX: NORMAL
BH CV LOWER ARTERIAL LEFT 4TH DIGIT SYS MAX: NORMAL
BH CV LOWER ARTERIAL LEFT 5TH DIGIT SYS MAX: NORMAL
BH CV LOWER ARTERIAL LEFT ABI RATIO: NORMAL
BH CV LOWER ARTERIAL LEFT DORSALIS PEDIS SYS MAX: 213
BH CV LOWER ARTERIAL LEFT GREAT TOE SYS MAX: 166
BH CV LOWER ARTERIAL LEFT POST TIBIAL SYS MAX: NORMAL
BH CV LOWER ARTERIAL LEFT TBI RATIO: 1.02
BH CV LOWER ARTERIAL RIGHT 2ND DIGIT SYS MAX: 119
BH CV LOWER ARTERIAL RIGHT 3RD DIGIT SYS MAX: 87
BH CV LOWER ARTERIAL RIGHT 4TH DIGIT SYS MAX: 136
BH CV LOWER ARTERIAL RIGHT 5TH DIGIT SYS MAX: 127
BH CV LOWER ARTERIAL RIGHT ABI RATIO: NORMAL
BH CV LOWER ARTERIAL RIGHT DORSALIS PEDIS SYS MAX: NORMAL
BH CV LOWER ARTERIAL RIGHT GREAT TOE SYS MAX: 139
BH CV LOWER ARTERIAL RIGHT POST TIBIAL SYS MAX: NORMAL
BH CV LOWER ARTERIAL RIGHT TBI RATIO: 0.85
UPPER ARTERIAL LEFT ARM BRACHIAL SYS MAX: 159
UPPER ARTERIAL RIGHT ARM BRACHIAL SYS MAX: 163

## 2024-08-05 PROCEDURE — 93970 EXTREMITY STUDY: CPT

## 2024-08-05 PROCEDURE — 93922 UPR/L XTREMITY ART 2 LEVELS: CPT

## 2024-08-05 PROCEDURE — 93970 EXTREMITY STUDY: CPT | Performed by: STUDENT IN AN ORGANIZED HEALTH CARE EDUCATION/TRAINING PROGRAM

## 2024-08-05 PROCEDURE — 93922 UPR/L XTREMITY ART 2 LEVELS: CPT | Performed by: SURGERY

## 2024-08-06 LAB
BH CV LEFT LOWER VAS COMMON FEMORAL REFLUX TIME: 0.93 SEC
BH CV LEFT LOWER VAS GSV KNEE REFLUX TIME: 2.65 SEC
BH CV LEFT LOWER VAS GSV KNEE TRANSVERSE DIAMETER: 0.31 CM
BH CV LEFT LOWER VAS GSV MID REFLUX TIME: 3.16 SEC
BH CV LEFT LOWER VAS GSV MID TRANSVERSE DIAMETER: 0.26 CM
BH CV LEFT LOWER VAS GSV PROX REFLUX TIME: 3.1 SEC
BH CV LEFT LOWER VAS GSV PROX TRANSVERSE DIAMETER: 0.42 CM
BH CV LEFT LOWER VAS POPLITEAL REFLUX TIME: 1.26 SEC
BH CV LEFT LOWER VAS SAPHENOFEMORAL JUNCTION REFLUX TIME: 1.53 SEC
BH CV LEFT LOWER VAS SAPHENOFEMORAL JUNCTION TRANSVERSE DIAMETER: 0.71 CM
BH CV LEFT LOWER VAS SPJ TRANS DIAMETER: 0.17 CM
BH CV LOWER VAS LEFT GSV DIST THIGH COMPRESSIBILTY: NORMAL
BH CV LOWER VAS LEFT GSV MID CALF COMPRESSIBILTY: NORMAL
BH CV LOWER VAS LEFT GSV MID THIGH COMPRESSIBILTY: NORMAL
BH CV LOWER VAS RIGHT GSV DIST THIGH COMPRESSIBILTY: NORMAL
BH CV LOWER VAS RIGHT GSV MID CALF COMPRESSIBILTY: NORMAL
BH CV LOWER VAS RIGHT GSV MID THIGH COMPRESSIBILTY: NORMAL
BH CV LOWER VASCULAR LEFT COMMON FEMORAL AUGMENT: NORMAL
BH CV LOWER VASCULAR LEFT COMMON FEMORAL COMPETENT: NORMAL
BH CV LOWER VASCULAR LEFT COMMON FEMORAL COMPRESS: NORMAL
BH CV LOWER VASCULAR LEFT COMMON FEMORAL PHASIC: NORMAL
BH CV LOWER VASCULAR LEFT COMMON FEMORAL SPONT: NORMAL
BH CV LOWER VASCULAR LEFT DISTAL FEMORAL COMPRESS: NORMAL
BH CV LOWER VASCULAR LEFT GASTRONEMIUS COMPRESS: NORMAL
BH CV LOWER VASCULAR LEFT GREATER SAPH AK COMPETENT: NORMAL
BH CV LOWER VASCULAR LEFT GREATER SAPH AK COMPRESS: NORMAL
BH CV LOWER VASCULAR LEFT GREATER SAPH BK COMPETENT: NORMAL
BH CV LOWER VASCULAR LEFT GREATER SAPH BK COMPRESS: NORMAL
BH CV LOWER VASCULAR LEFT GSV DIST THIGH COMPETENT: NORMAL
BH CV LOWER VASCULAR LEFT GSV MID CALF COMPETENT: NORMAL
BH CV LOWER VASCULAR LEFT GSV MID THIGH COMPETENT: NORMAL
BH CV LOWER VASCULAR LEFT LESSER SAPH COMPETENT: NORMAL
BH CV LOWER VASCULAR LEFT LESSER SAPH COMPRESS: NORMAL
BH CV LOWER VASCULAR LEFT MID FEMORAL AUGMENT: NORMAL
BH CV LOWER VASCULAR LEFT MID FEMORAL COMPETENT: NORMAL
BH CV LOWER VASCULAR LEFT MID FEMORAL COMPRESS: NORMAL
BH CV LOWER VASCULAR LEFT MID FEMORAL PHASIC: NORMAL
BH CV LOWER VASCULAR LEFT MID FEMORAL SPONT: NORMAL
BH CV LOWER VASCULAR LEFT PERONEAL COMPRESS: NORMAL
BH CV LOWER VASCULAR LEFT POPLITEAL AUGMENT: NORMAL
BH CV LOWER VASCULAR LEFT POPLITEAL COMPETENT: NORMAL
BH CV LOWER VASCULAR LEFT POPLITEAL COMPRESS: NORMAL
BH CV LOWER VASCULAR LEFT POPLITEAL PHASIC: NORMAL
BH CV LOWER VASCULAR LEFT POPLITEAL SPONT: NORMAL
BH CV LOWER VASCULAR LEFT POSTERIOR TIBIAL COMPRESS: NORMAL
BH CV LOWER VASCULAR LEFT PROFUNDA FEMORAL COMPRESS: NORMAL
BH CV LOWER VASCULAR LEFT PROXIMAL FEMORAL COMPRESS: NORMAL
BH CV LOWER VASCULAR LEFT SAPHENOFEMORAL JUNCTION AUGMENT: NORMAL
BH CV LOWER VASCULAR LEFT SAPHENOFEMORAL JUNCTION COMPETENT: NORMAL
BH CV LOWER VASCULAR LEFT SAPHENOFEMORAL JUNCTION COMPRESS: NORMAL
BH CV LOWER VASCULAR LEFT SAPHENOFEMORAL JUNCTION PHASIC: NORMAL
BH CV LOWER VASCULAR LEFT SAPHENOFEMORAL JUNCTION SPONT: NORMAL
BH CV LOWER VASCULAR LEFT SAPHENOPOP JX AUGMENT: NORMAL
BH CV LOWER VASCULAR LEFT SAPHENOPOP JX COMPETENT: NORMAL
BH CV LOWER VASCULAR LEFT SAPHENOPOP JX COMPRESS: NORMAL
BH CV LOWER VASCULAR LEFT SAPHENOPOP JX PHASIC: NORMAL
BH CV LOWER VASCULAR LEFT SAPHENOPOP JX SPONT: NORMAL
BH CV LOWER VASCULAR LEFT SSV MID CALF COMPETENT: NORMAL
BH CV LOWER VASCULAR LEFT SSV MID CALF COMPRESS: NORMAL
BH CV LOWER VASCULAR RIGHT COMMON FEMORAL AUGMENT: NORMAL
BH CV LOWER VASCULAR RIGHT COMMON FEMORAL COMPETENT: NORMAL
BH CV LOWER VASCULAR RIGHT COMMON FEMORAL COMPRESS: NORMAL
BH CV LOWER VASCULAR RIGHT COMMON FEMORAL PHASIC: NORMAL
BH CV LOWER VASCULAR RIGHT COMMON FEMORAL SPONT: NORMAL
BH CV LOWER VASCULAR RIGHT DISTAL FEMORAL COMPRESS: NORMAL
BH CV LOWER VASCULAR RIGHT GASTRONEMIUS COMPRESS: NORMAL
BH CV LOWER VASCULAR RIGHT GREATER SAPH AK COMPETENT: NORMAL
BH CV LOWER VASCULAR RIGHT GREATER SAPH BK COMPETENT: NORMAL
BH CV LOWER VASCULAR RIGHT GREATER SAPH BK COMPRESS: NORMAL
BH CV LOWER VASCULAR RIGHT GSV DIST THIGH COMPETENT: NORMAL
BH CV LOWER VASCULAR RIGHT GSV MID CALF COMPETENT: NORMAL
BH CV LOWER VASCULAR RIGHT GSV MID THIGH COMPETENT: NORMAL
BH CV LOWER VASCULAR RIGHT LESSER SAPH COMPETENT: NORMAL
BH CV LOWER VASCULAR RIGHT LESSER SAPH COMPRESS: NORMAL
BH CV LOWER VASCULAR RIGHT MID FEMORAL AUGMENT: NORMAL
BH CV LOWER VASCULAR RIGHT MID FEMORAL COMPETENT: NORMAL
BH CV LOWER VASCULAR RIGHT MID FEMORAL COMPRESS: NORMAL
BH CV LOWER VASCULAR RIGHT MID FEMORAL PHASIC: NORMAL
BH CV LOWER VASCULAR RIGHT MID FEMORAL SPONT: NORMAL
BH CV LOWER VASCULAR RIGHT PERFORATOR 1 COMPETENT: NORMAL
BH CV LOWER VASCULAR RIGHT PERFORATOR 1 COMPRESS: NORMAL
BH CV LOWER VASCULAR RIGHT PERONEAL COMPRESS: NORMAL
BH CV LOWER VASCULAR RIGHT POPLITEAL AUGMENT: NORMAL
BH CV LOWER VASCULAR RIGHT POPLITEAL COMPETENT: NORMAL
BH CV LOWER VASCULAR RIGHT POPLITEAL COMPRESS: NORMAL
BH CV LOWER VASCULAR RIGHT POPLITEAL PHASIC: NORMAL
BH CV LOWER VASCULAR RIGHT POPLITEAL SPONT: NORMAL
BH CV LOWER VASCULAR RIGHT POSTERIOR TIBIAL COMPRESS: NORMAL
BH CV LOWER VASCULAR RIGHT PROFUNDA FEMORAL COMPRESS: NORMAL
BH CV LOWER VASCULAR RIGHT PROXIMAL FEMORAL COMPRESS: NORMAL
BH CV LOWER VASCULAR RIGHT SAPHENOFEMORAL JUNCTION AUGMENT: NORMAL
BH CV LOWER VASCULAR RIGHT SAPHENOFEMORAL JUNCTION COMPETENT: NORMAL
BH CV LOWER VASCULAR RIGHT SAPHENOFEMORAL JUNCTION COMPRESS: NORMAL
BH CV LOWER VASCULAR RIGHT SAPHENOFEMORAL JUNCTION PHASIC: NORMAL
BH CV LOWER VASCULAR RIGHT SAPHENOFEMORAL JUNCTION SPONT: NORMAL
BH CV LOWER VASCULAR RIGHT SAPHENOPOP JX AUGMENT: NORMAL
BH CV LOWER VASCULAR RIGHT SAPHENOPOP JX COMPETENT: NORMAL
BH CV LOWER VASCULAR RIGHT SAPHENOPOP JX COMPRESS: NORMAL
BH CV LOWER VASCULAR RIGHT SAPHENOPOP JX PHASIC: NORMAL
BH CV LOWER VASCULAR RIGHT SAPHENOPOP JX SPONT: NORMAL
BH CV LOWER VASCULAR RIGHT SSV MID CALF COMPETENT: NORMAL
BH CV LOWER VASCULAR RIGHT SSV MID CALF COMPRESS: NORMAL
BH CV RIGHT LOWER VAS COMMON FEMORAL REFLUX COLOR FLOW TIME: 1.07 SEC
BH CV RIGHT LOWER VAS GSV KNEE TRANS DIAMETER: 0.23 CM
BH CV RIGHT LOWER VAS GSV PROX THIGH TRANS DIAMETER: 0.38 CM
BH CV RIGHT LOWER VAS SAPHENOFEM JUNCTION TRANSVERSE DIAMETER: 0.59 CM
BH CV RIGHT LOWER VAS SPJ TRANS DIAMETER: 0.18 CM
BH CV VAS RIGHT GSV PROXIMAL HIDDEN LRR COMPRESSIBILTY: NORMAL

## 2024-08-09 ENCOUNTER — OFFICE VISIT (OUTPATIENT)
Age: 74
End: 2024-08-09
Payer: MEDICARE

## 2024-08-09 VITALS
WEIGHT: 174 LBS | BODY MASS INDEX: 29.71 KG/M2 | HEIGHT: 64 IN | SYSTOLIC BLOOD PRESSURE: 128 MMHG | DIASTOLIC BLOOD PRESSURE: 74 MMHG

## 2024-08-09 DIAGNOSIS — I73.9 PERIPHERAL ARTERIAL DISEASE: Primary | ICD-10-CM

## 2024-08-09 NOTE — PROGRESS NOTES
"Chief Complaint  Follow-up (Follow up with CAMERON scan. )    New patient referral for peripheral arterial disease.    Subjective        Diana Gee presents to [facility] VASCULAR SURGERY  History of Present Illness    Patient is a very pleasant 74-year-old lady with a history of diabetes, end-stage renal disease status post kidney transplant x 2, initially in 1995 and then was retransplanted in 2011, with diabetic neuropathy causing numbness and sensitivity in both of her feet who was referred to be evaluated for peripheral arterial disease.  The patient is also developed some bilateral lower extremity edema as well.    Is here today for routine follow-up with noninvasive arterial testing and venous reflux studies.    She reports she is doing well with no acute complaints since she was last seen here.    The small ulceration on her right first toe where her toenail had been clipped too short is almost completely healed.        Objective   Vital Signs:  /74   Ht 162 cm (63.78\")   Wt 78.9 kg (174 lb)   BMI 30.07 kg/m²   Estimated body mass index is 30.07 kg/m² as calculated from the following:    Height as of this encounter: 162 cm (63.78\").    Weight as of this encounter: 78.9 kg (174 lb).                      Physical Exam   NAD  NCAT  Mild  pitting edema bilateral lower legs, no hyperkeratosis or hemosiderin deposition noted  Left foot toe amputation site completely healed.  Right foot toe wound at the site of her nail trimming is almost completely healed now.    Result Review :          ABIs from 2020 at [facility] system:        8/5/2024:                     Assessment and Plan       74-year-old lady with longstanding history of diabetes and end-stage renal disease status post 2 previous kidney transplants here to be evaluated for peripheral arterial disease.  She does not have any symptoms of claudication.    The patient's noninvasive studies with toe pressures suggest adequate " perfusion in all of her toes to heal any wounds.  I do not think she needs any intervention or further workup for peripheral arterial disease at this time.  The small wound she had from trimming her nails is almost completely healed now.    She does have some deep and superficial venous insufficiency on her lower extremity reflux studies, but her symptoms are minimal and she has no evidence of venous wounds and not really any sequela of chronic venous insufficiency or chronic edema that I can appreciate on exam.    Patient not interested in referral to vein clinic at this time.      I will see the patient back in 1 year with repeat ABIs with toe pressures.  Patient instructed to come see us sooner if she has any new or worsening issues with her legs or feet or anything else we can help with.           Diagnoses and all orders for this visit:    1. Peripheral arterial disease (Primary)  -     Doppler Arterial Multi Level Lower Extremity - Bilateral CAR; Future                Patient BMI noted. Educational material for patient for health risks of being overweight added to patient's after visit summary.           Follow Up     Return in about 1 year (around 8/9/2025).  Patient was given instructions and counseling regarding her condition or for health maintenance advice. Please see specific information pulled into the AVS if appropriate.

## 2024-08-19 ENCOUNTER — HOSPITAL ENCOUNTER (OUTPATIENT)
Dept: MAMMOGRAPHY | Facility: HOSPITAL | Age: 74
Discharge: HOME | End: 2024-08-19
Payer: MEDICARE

## 2024-08-19 ENCOUNTER — HOSPITAL ENCOUNTER (OUTPATIENT)
Dept: BONE DENSITY | Facility: HOSPITAL | Age: 74
Discharge: HOME | End: 2024-08-19
Payer: MEDICARE

## 2024-08-19 PROCEDURE — 77080 DXA BONE DENSITY AXIAL: CPT

## 2024-08-19 PROCEDURE — 77067 SCR MAMMO BI INCL CAD: CPT

## 2024-08-19 PROCEDURE — 77063 BREAST TOMOSYNTHESIS BI: CPT

## 2024-08-27 ENCOUNTER — TELEPHONE (OUTPATIENT)
Dept: FAMILY MEDICINE CLINIC | Facility: CLINIC | Age: 74
End: 2024-08-27

## 2025-01-09 ENCOUNTER — TELEPHONE (OUTPATIENT)
Dept: FAMILY MEDICINE CLINIC | Facility: CLINIC | Age: 75
End: 2025-01-09
Payer: MEDICARE

## 2025-01-09 NOTE — TELEPHONE ENCOUNTER
HUB RELAY:  Called to adv patient to geri appt from 1/6/2025 that was canceled due to weather. Vm box was full - sending mssg via my chart

## 2025-03-04 RX ORDER — ESCITALOPRAM OXALATE 20 MG/1
20 TABLET ORAL DAILY
Qty: 90 TABLET | Refills: 1 | Status: SHIPPED | OUTPATIENT
Start: 2025-03-04

## 2025-03-04 NOTE — TELEPHONE ENCOUNTER
Rx Refill Note  Requested Prescriptions     Pending Prescriptions Disp Refills    escitalopram (LEXAPRO) 20 MG tablet [Pharmacy Med Name: ESCITALOPRAM OXALATE 20MG TABS] 90 tablet 2     Sig: TAKE ONE TABLET BY MOUTH EVERY DAY      Last office visit with prescribing clinician: 7/5/2024   Last telemedicine visit with prescribing clinician: Visit date not found   Next office visit with prescribing clinician: Visit date not found                         Would you like a call back once the refill request has been completed: [] Yes [] No    If the office needs to give you a call back, can they leave a voicemail: [] Yes [] No    Rosalie Baker MA  03/04/25, 07:56 EST

## 2025-03-20 ENCOUNTER — TELEPHONE (OUTPATIENT)
Dept: FAMILY MEDICINE CLINIC | Facility: CLINIC | Age: 75
End: 2025-03-20

## 2025-03-20 NOTE — TELEPHONE ENCOUNTER
Caller: Diana Gee    Relationship: Self    Best call back number: [phone]      What are your current symptoms: SHINGLES    How long have you been experiencing symptoms: 1 WEEK    Have you had these symptoms before:    [x] Yes  [] No    Have you been treated for these symptoms before:   [x] Yes  [] No    If a prescription is needed, what is your preferred pharmacy and phone number: [facility] [id number] [facility] [address] [place] [address] RD AT [facility].  [phone] [facility] [phone] FX     PLEASE CALL TO FOLLOW UP

## 2025-03-25 ENCOUNTER — OFFICE VISIT (OUTPATIENT)
Dept: FAMILY MEDICINE CLINIC | Facility: CLINIC | Age: 75
End: 2025-03-25
Payer: MEDICARE

## 2025-03-25 VITALS
WEIGHT: 180.6 LBS | HEART RATE: 63 BPM | HEIGHT: 63 IN | BODY MASS INDEX: 32 KG/M2 | TEMPERATURE: 97.3 F | DIASTOLIC BLOOD PRESSURE: 71 MMHG | OXYGEN SATURATION: 94 % | SYSTOLIC BLOOD PRESSURE: 127 MMHG

## 2025-03-25 DIAGNOSIS — M79.2 NEURALGIA: ICD-10-CM

## 2025-03-25 DIAGNOSIS — B02.9 HERPES ZOSTER WITHOUT COMPLICATION: Primary | ICD-10-CM

## 2025-03-25 PROCEDURE — 3078F DIAST BP <80 MM HG: CPT | Performed by: NURSE PRACTITIONER

## 2025-03-25 PROCEDURE — 1125F AMNT PAIN NOTED PAIN PRSNT: CPT | Performed by: NURSE PRACTITIONER

## 2025-03-25 PROCEDURE — 99214 OFFICE O/P EST MOD 30 MIN: CPT | Performed by: NURSE PRACTITIONER

## 2025-03-25 PROCEDURE — 3074F SYST BP LT 130 MM HG: CPT | Performed by: NURSE PRACTITIONER

## 2025-03-25 RX ORDER — GABAPENTIN 100 MG/1
100-300 CAPSULE ORAL NIGHTLY PRN
Qty: 45 CAPSULE | Refills: 0 | Status: SHIPPED | OUTPATIENT
Start: 2025-03-25

## 2025-03-25 RX ORDER — LIDOCAINE 50 MG/G
1 OINTMENT TOPICAL
Qty: 100 G | Refills: 2 | Status: SHIPPED | OUTPATIENT
Start: 2025-03-25

## 2025-03-25 NOTE — PROGRESS NOTES
"Chief Complaint  Hypertension and Herpes Zoster (Had for the past week and a half, located under breast and back and under right arm)    Subjective        Diana Gee presents to [facility] PRIMARY CARE  History of Present Illness  Very pleasant patient here today follow-up essential hypertension presently controlled,  As well as last week Thursday, she had called here and sent to urgent care for painful rash which turned out to be shingles right underarm, upper back, shoulder, shingles valacyclovir, increased pain at night, uncomfortable difficult time sleeping using antibiotic ointment, she has no fevers shortness of breath lethargy weakness or other worries,  She is up-to-date with transplant team, renal transplant 2008, doing locally here recent renal function looks normal creatinine slightly decreased GFR with increased bond and she is drinking more water.  CBC look good  Hypertension    Herpes Zoster      Objective   Vital Signs:  /71 (BP Location: Right arm, Patient Position: Sitting, Cuff Size: Adult)   Pulse 63   Temp 97.3 °F (36.3 °C) (Temporal)   Ht 160 cm (63\")   Wt 81.9 kg (180 lb 9.6 oz)   SpO2 94%   BMI 31.99 kg/m²   Estimated body mass index is 31.99 kg/m² as calculated from the following:    Height as of this encounter: 160 cm (63\").    Weight as of this encounter: 81.9 kg (180 lb 9.6 oz).            Physical Exam  Constitutional:       General: She is not in acute distress.     Appearance: Normal appearance. She is not ill-appearing, toxic-appearing or diaphoretic.   Eyes:      Conjunctiva/sclera: Conjunctivae normal.      Pupils: Pupils are equal, round, and reactive to light.   Cardiovascular:      Rate and Rhythm: Normal rate and regular rhythm.   Pulmonary:      Effort: Pulmonary effort is normal. No respiratory distress.   Skin:     Comments: Maroon, papules, macules, right axilla posterior shoulder, scapular region, upper chest,  Consistent with shingles, " scattered clusters redness   Neurological:      General: No focal deficit present.      Mental Status: She is alert. Mental status is at baseline.   Psychiatric:         Mood and Affect: Mood normal.         Thought Content: Thought content normal.         Judgment: Judgment normal.        Result Review :                Assessment and Plan   Diagnoses and all orders for this visit:    1. Herpes zoster without complication (Primary)    2. Neuralgia    Other orders  -     lidocaine (XYLOCAINE) 5 % ointment; Apply 1 Application topically to the appropriate area as directed Every 2 (Two) Hours As Needed for Moderate Pain.  Dispense: 100 g; Refill: 2  -     gabapentin (NEURONTIN) 100 MG capsule; Take 1-3 capsules by mouth At Night As Needed (Nerve pain). Caution may increase sedation, falls, discontinue if difficulties  Dispense: 45 capsule; Refill: 0             Follow Up   Return in about 1 month (around 4/25/2025) for Print discharge instructions/educational handouts.  Patient was given instructions and counseling regarding her condition or for health maintenance advice. Please see specific information pulled into the AVS if appropriate.     Patient Instructions   Discharge instructions finish the valacyclovir you doing excellent,  Topical lidocaine ointment every 2-3 hours as needed or over-the-counter    Gabapentin  100 mg  2 capsules tonight at bedtime about 30 minutes before bedtime or so  If needed you can take up to 3 or as well as 1,  Update me your progress in 1 week please recheck as needed, severe pain fever chills emergency room  Falls precaution with this medication may cause profound drowsiness dizziness!!!     Sudhakar clear  Short-term use only  Acute  Risk of sedation falls, potential but low risk of addiction  Proper use disposal need for follow-up, severe pain weakness fever chills worse emergency room

## 2025-03-25 NOTE — PATIENT INSTRUCTIONS
Discharge instructions finish the valacyclovir you doing excellent,  Topical lidocaine ointment every 2-3 hours as needed or over-the-counter    Gabapentin  100 mg  2 capsules tonight at bedtime about 30 minutes before bedtime or so  If needed you can take up to 3 or as well as 1,  Update me your progress in 1 week please recheck as needed, severe pain fever chills emergency room  Falls precaution with this medication may cause profound drowsiness dizziness!!!

## 2025-03-31 ENCOUNTER — TELEPHONE (OUTPATIENT)
Dept: FAMILY MEDICINE CLINIC | Facility: CLINIC | Age: 75
End: 2025-03-31
Payer: MEDICARE

## 2025-03-31 NOTE — TELEPHONE ENCOUNTER
Spoke with patient in detail. Advised PA was denied. States that she has bought something over the counter with lidocaine in it and it seems to be working for her. No further orders needed at this time.

## 2025-03-31 NOTE — TELEPHONE ENCOUNTER
Please call patient    If she still needs this I will switch it to 2% lidocaine jelly    These are over-the-counter as well and the pharmacist can direct her to this make sure she asked for assistance if this is not covered    Insurance is not helpful unfortunately with this   The patches, are not very helpful as they only help very small spot    Hopefully she does not need this anymore but if so I sent over the pharmacy and if not covered it is over-the-counter should she need this thank you

## 2025-03-31 NOTE — TELEPHONE ENCOUNTER
PA for Licocaine 5% ointment denied.  Per insurance:    under Medicare Part D, it must be used for a medically-accepted indication (medical conditions   that the drug is used for). Medically-accepted indications are supported by the Food and Drug   Administration (FDA) labeling of the drug and/or medical references approved by Medicare Part   D. We must deny this request because the information provided by your prescriber (diagnosis of   zoster without complications) did not meet the requirement(s) of a medically accepted   indication. We may consider approval of this drug when you have a certain diagnosis or reason   for treatment (such as anesthesia of accessible mucous membranes of the oropharynx [such as   back of the tongue, soft palate, side and back walls of the throat, and the tonsils]; or relief of   pain and itching due to minor cuts, minor scrapes, minor skin irritations, minor burns, and   insect bites). We based this decision on your health plan's prior authorization criteria named   Lidocaine 5% Topical Agents.

## 2025-05-28 ENCOUNTER — OFFICE VISIT (OUTPATIENT)
Dept: CARDIOLOGY | Age: 75
End: 2025-05-28
Payer: MEDICARE

## 2025-05-28 VITALS
HEART RATE: 66 BPM | HEIGHT: 63 IN | WEIGHT: 184 LBS | BODY MASS INDEX: 32.6 KG/M2 | DIASTOLIC BLOOD PRESSURE: 66 MMHG | SYSTOLIC BLOOD PRESSURE: 122 MMHG

## 2025-05-28 DIAGNOSIS — E78.2 MIXED HYPERLIPIDEMIA: ICD-10-CM

## 2025-05-28 DIAGNOSIS — I34.0 NONRHEUMATIC MITRAL VALVE REGURGITATION: ICD-10-CM

## 2025-05-28 DIAGNOSIS — I25.10 NONOBSTRUCTIVE ATHEROSCLEROSIS OF CORONARY ARTERY: Primary | ICD-10-CM

## 2025-05-28 DIAGNOSIS — Z94.0 RENAL TRANSPLANT RECIPIENT: ICD-10-CM

## 2025-05-28 DIAGNOSIS — E11.59 TYPE 2 DIABETES MELLITUS WITH OTHER CIRCULATORY COMPLICATION, WITH LONG-TERM CURRENT USE OF INSULIN: ICD-10-CM

## 2025-05-28 DIAGNOSIS — Z79.4 TYPE 2 DIABETES MELLITUS WITH OTHER CIRCULATORY COMPLICATION, WITH LONG-TERM CURRENT USE OF INSULIN: ICD-10-CM

## 2025-05-28 DIAGNOSIS — I27.20 PULMONARY HYPERTENSION: ICD-10-CM

## 2025-05-28 DIAGNOSIS — I36.1 NONRHEUMATIC TRICUSPID VALVE REGURGITATION: ICD-10-CM

## 2025-05-28 DIAGNOSIS — I10 PRIMARY HYPERTENSION: ICD-10-CM

## 2025-05-28 DIAGNOSIS — I51.89 GRADE II DIASTOLIC DYSFUNCTION: ICD-10-CM

## 2025-05-28 PROBLEM — R68.89 DECREASED STRENGTH, ENDURANCE, AND MOBILITY: Status: RESOLVED | Noted: 2023-10-17 | Resolved: 2025-05-28

## 2025-05-28 PROBLEM — K52.1 DIARRHEA DUE TO DRUG: Status: RESOLVED | Noted: 2019-09-11 | Resolved: 2025-05-28

## 2025-05-28 PROBLEM — J15.9 HEALTHCARE ASSOCIATED BACTERIAL PNEUMONIA: Status: RESOLVED | Noted: 2022-02-02 | Resolved: 2025-05-28

## 2025-05-28 PROBLEM — Z74.09 DECREASED STRENGTH, ENDURANCE, AND MOBILITY: Status: RESOLVED | Noted: 2023-10-17 | Resolved: 2025-05-28

## 2025-05-28 PROBLEM — R53.1 DECREASED STRENGTH, ENDURANCE, AND MOBILITY: Status: RESOLVED | Noted: 2023-10-17 | Resolved: 2025-05-28

## 2025-05-28 NOTE — PROGRESS NOTES
Date of Office Visit: 2025  Encounter Provider: ROMARIO Xiao  Place of Service: [facility] CARDIOLOGY  Patient Name: Diana Gee  :1950  Primary Cardiologist: Dr. Ragini Capellan     Chief Complaint   Patient presents with    Annual Exam   :     HPI: Diana Gee is a 75 y.o. female who presents today for annual cardiac follow-up visit.  I have reviewed her medical records.    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  She then developed kidney cancer and her transplant failed.  In , she had a another kidney transplant.    She has a known history of type 2 diabetes mellitus, hepatitis C, DVT (), hypertension, hyperlipidemia, thyroid disease, and rheumatic fever as a child.  She has a pulmonary nodule followed by Dr. Magdiel Nye.      In May 2011, she had a heart catheterization which revealed mild nonobstructive coronary artery disease and normal filling pressures.      In 2020, she was having chest pain and dyspnea with exertion.  Echocardiogram showed normal LVEF, grade 1A diastolic dysfunction, left atrium moderately dilated, aortic valve sclerosis, mild mitral annular calcification, mild mitral regurgitation, and trace tricuspid regurgitation.  Cardiac PET scan was normal.    In 2024, noted a heart murmur.  Echocardiogram showed the following: LVEF 66-70%, GLS -17.5%, mild to moderate left concentric hypertrophy, grade 2 diastolic dysfunction, RV borderline dilated, LA moderately dilated, trace to mild aortic regurgitation, mild to moderate mitral regurgitation, mild to moderate tricuspid regurgitation, moderate pulmonary hypertension (RVSP 52 mmHg), and trace to mild pulmonic regurgitation.  I recommended referral to [facility] sleep medicine and she declined.    She presents today for an annual follow-up visit.  She has been doing very well.  She denies chest pain,  shortness of air, PND, orthopnea, edema, palpitations, dizziness, syncope, or bleeding.  She is not interested in pursuing sleep apnea evaluation.  Her blood pressure and heart rate are normal.  I have reviewed her blood work      Past Medical History:   Diagnosis Date    Abnormal glucose     Acute bronchitis     Acute upper respiratory infection     Anemia     C. difficile diarrhea 2008    [facility]    Carrier of viral hepatitis C     Declared viral free in Sept '14  Dr uJlio Serna  [facility]    Cellulitis of foot     Cerumen impaction     Charcot's joint     Depression with anxiety     Diabetes mellitus     Diastolic dysfunction     Disease of thyroid gland     Failed kidney transplant     Gastric ulcer     GERD (gastroesophageal reflux disease)     Gout     Grade II diastolic dysfunction 06/26/2019    Graves disease     Healthcare associated bacterial pneumonia 02/02/2022    Healthcare-associated pneumonia 04/27/2021    Heart murmur     Hepatitis C     Hyperlipidemia     Hypertension     Hypothyroidism     Insomnia     Kidney carcinoma     Kidney failure     MR (mitral regurgitation)     moderate, with cunningham dysfctn, severe LAE, mod OLENA, aortic thickening without insuff- Dr Horn    Neuropathy     Osteomyelitis of toe     Osteoporosis     Peripheral edema     Pes planus     Subconjunctival hemorrhage     Transplant recipient 1995, 2011    BILATERAL KIDNEYS    Type 2 diabetes mellitus with diabetic mononeuropathy        Past Surgical History:   Procedure Laterality Date    AMPUTATION DIGIT Left 6/9/2016    Procedure: LT 4TH TOE AMPUTATION;  Surgeon: Navjot Gandara MD;  Location: [facility] OR [facility];  Service:     AMPUTATION DIGIT Left 12/15/2016    Procedure: LT THIRD TOE AMPUTATION;  Surgeon: Navjot Gandara MD;  Location: [facility] OR;  Service:     ARTERIOVENOUS FISTULA Bilateral 1992-LEFT ARM, 2008-RIGHT ARM    CHOLECYSTECTOMY  1992    COLONOSCOPY N/A 5/31/2019    Procedure: COLONOSCOPY WITHOUT  PREP WITH BIOPSIES;  Surgeon: Cam Shin MD;  Location: [facility] ENDOSCOPY;  Service: Gastroenterology    EYE SURGERY Bilateral 1997, 1998    CATARACTS    JOINT REPLACEMENT Left 2001    SHOULDER    NEPHRECTOMY Right 2008    CANCER    SKIN GRAFT Right 2012    LEG    TOE AMPUTATION Left 2014    FIFTH TOE    TONSILLECTOMY  1964    TRANSPLANTATION RENAL Left 1995-RIGHT KIDNEY TRANSPLANT    TUBAL ABDOMINAL LIGATION         Social History     Socioeconomic History    Marital status:      Spouse name: Calvin   Tobacco Use    Smoking status: Former     Current packs/day: 0.00     Average packs/day: 0.5 packs/day for 4.0 years (2.0 ttl pk-yrs)     Types: Cigarettes     Start date: 1989     Quit date: 1993     Years since quittin.4    Smokeless tobacco: Never   Vaping Use    Vaping status: Never Used   Substance and Sexual Activity    Alcohol use: No     Comment: caffeine use: 1 cup 3 times wkly    Drug use: No    Sexual activity: Defer       Family History   Problem Relation Age of Onset    Hypothyroidism Mother     COPD Mother     COPD Father        The following portion of the patient's history were reviewed and updated as appropriate: past medical history, past surgical history, past social history, past family history, allergies, current medications, and problem list.    Review of Systems   Constitutional: Negative.   Cardiovascular: Negative.  Negative for dyspnea on exertion.   Respiratory: Negative.     Hematologic/Lymphatic: Negative.    Neurological: Negative.  Negative for excessive daytime sleepiness and dizziness.       Allergies   Allergen Reactions    Heparin Hives and Myalgia     Tremors,fever    Levofloxacin Hives and Anaphylaxis     Throat swelling no treatment given         Current Outpatient Medications:     Accu-Chek FastClix Lancets misc, , Disp: , Rfl:     Accu-Chek SmartView test strip, , Disp: , Rfl:     aspirin 81 MG EC tablet, Take 1 tablet by mouth Daily., Disp: ,  Rfl:     azaTHIOprine (IMURAN) 50 MG tablet, Take 1 tablet by mouth Daily. 3 tablets once daily, Disp: , Rfl:     BD Pen Needle Paula 2nd Gen 32G X 4 MM misc, , Disp: , Rfl:     carvedilol (COREG) 25 MG tablet, Take 1 tablet by mouth 2 (Two) Times a Day With Meals., Disp: , Rfl:     Cholecalciferol (VITAMIN D3) 5000 units tablet tablet, Take 1 tablet by mouth Daily., Disp: , Rfl:     escitalopram (LEXAPRO) 20 MG tablet, TAKE ONE TABLET BY MOUTH EVERY DAY, Disp: 90 tablet, Rfl: 1    esomeprazole (nexIUM) 20 MG capsule, Take 1 capsule by mouth Every Morning Before Breakfast., Disp: , Rfl:     glucose blood test strip, 1 each by Other route 2 (Two) Times a Day., Disp: , Rfl:     levothyroxine (SYNTHROID, LEVOTHROID) 125 MCG tablet, Take 1 tablet by mouth Daily. (Patient taking differently: Take 1 tablet by mouth Daily. Mon-sat hold Sunday), Disp: , Rfl:     magnesium oxide (MAG-OX) 400 MG tablet, Take 2 tablets by mouth 2 (Two) Times a Day., Disp: , Rfl:     Multiple Vitamins-Minerals (MULTIVITAMIN WITH MINERALS) tablet tablet, Take 1 tablet by mouth Daily., Disp: , Rfl:     olmesartan (BENICAR) 5 MG tablet, Take 1 tablet by mouth Daily., Disp: , Rfl:     predniSONE (DELTASONE) 10 MG tablet, Take 2.5 mg by mouth Daily., Disp: , Rfl:     rosuvastatin (CRESTOR) 40 MG tablet, Take 1 tablet by mouth Daily., Disp: , Rfl:     tacrolimus (PROGRAF) 1 MG capsule, Take 1 capsule by mouth 2 (Two) Times a Day., Disp: , Rfl:     Tresiba FlexTouch 100 UNIT/ML solution pen-injector injection, INJECT 6 UNITS UNDER THE SKIN NIGHTLY. PEN EXPIRES 56 DAYS AFTER OPENING, Disp: , Rfl:     insulin lispro (humaLOG) 100 UNIT/ML injection, Inject 4 Units under the skin into the appropriate area as directed Every Morning., Disp: , Rfl:     nystatin 165375 UNIT/GM topical powder, As Needed. (Patient not taking: Reported on 5/28/2025), Disp: , Rfl:     valACYclovir (VALTREX) 1000 MG tablet, Take 1 tablet by mouth 3 (Three) Times a Day. (Patient not  "taking: Reported on 5/28/2025), Disp: 21 tablet, Rfl: 0          Objective:     Vitals:    05/28/25 1410   BP: 122/66   BP Location: Left arm   Patient Position: Sitting   Cuff Size: Large Adult   Pulse: 66   Weight: 83.5 kg (184 lb)   Height: 160 cm (63\")     Body mass index is 32.59 kg/m².    PHYSICAL EXAM:    Vitals Reviewed.   General Appearance: No acute distress, well developed and well nourished. Obese.   HENT: No hearing loss noted.    Respiratory: No signs of respiratory distress. Respiration rhythm and depth normal.  Clear to auscultation.   Cardiovascular:  Jugular Venous Pressure: Normal  Heart Rate and Rhythm: Normal, Heart Sounds: Normal S1 and S2. No S3 or S4 noted.  Murmurs: RUSB grade 1/6 systolic murmur present.  Lower Extremities: No edema noted.  Musculoskeletal: Normal movement of extremities.  Skin: General appearance normal.    Psychiatric: Patient alert and oriented to person, place, and time. Speech and behavior appropriate. Normal mood and affect.       ECG 12 Lead    Date/Time: 5/28/2025 2:19 PM  Performed by: Tawanna Schwarz APRN    Authorized by: Tawanna Schwarz APRN  Comparison: compared with previous ECG from 5/22/2024  Similar to previous ECG  Rhythm: sinus rhythm  Rate: normal  BPM: 64  Conduction: conduction normal  ST Elevation: V1, V2 and V3  T Waves: T waves normal  QRS axis: normal  Other findings: non-specific ST-T wave changes    Clinical impression: non-specific ECG            Assessment:       Diagnosis Plan   1. Nonobstructive atherosclerosis of coronary artery        2. Grade II diastolic dysfunction        3. Nonrheumatic mitral valve regurgitation  Adult Transthoracic Echo Complete w/ Color, Spectral and Contrast if Necessary Per Protocol      4. Nonrheumatic tricuspid valve regurgitation  Adult Transthoracic Echo Complete w/ Color, Spectral and Contrast if Necessary Per Protocol      5. Pulmonary hypertension  Adult Transthoracic Echo Complete w/ Color, Spectral and " Contrast if Necessary Per Protocol      6. Primary hypertension        7. Mixed hyperlipidemia        8. Renal transplant recipient                 Plan:       1.Nonobstructive coronary artery disease noted per catheterization in May 2011.  Normal stress test in 2020.  Denies angina.  Continue aspirin and statin.    2.  Grade 2 diastolic dysfunction.  Denies shortness of breath.    3/4.  Mild to moderate mitral regurgitation and tricuspid regurgitation noted on echocardiogram in 2024.  Repeat echocardiogram.    5.  Moderate pulmonary hypertension noted on last echocardiogram.  Denies shortness of breath.  We will remeasure pulmonary pressures on upcoming echocardiogram.  She has politely declined a sleep apnea evaluation.    6.  Hypertension: Blood pressure stable.    7.  Hyperlipidemia: Remains on rosuvastatin    8.  Type 2 diabetes mellitus.    9.  Renal transplant recipient on 2 different occasions.    10.  Overall, she is doing well from a cardiac standpoint.  She will follow-up with Dr. Capellan in 1 year.    As always, it has been a pleasure to participate in your patient's care. Thank you.         Sincerely,         ROMARIO Maldonado  [facility]      Dictated utilizing Dragon Dictation

## 2025-06-03 ENCOUNTER — APPOINTMENT (OUTPATIENT)
Dept: WOMENS IMAGING | Facility: HOSPITAL | Age: 75
End: 2025-06-03
Payer: MEDICARE

## 2025-06-03 PROCEDURE — 76642 ULTRASOUND BREAST LIMITED: CPT | Performed by: RADIOLOGY

## 2025-06-03 PROCEDURE — G0279 TOMOSYNTHESIS, MAMMO: HCPCS | Performed by: RADIOLOGY

## 2025-06-03 PROCEDURE — 77066 DX MAMMO INCL CAD BI: CPT | Performed by: RADIOLOGY

## 2025-06-05 ENCOUNTER — TRANSCRIBE ORDERS (OUTPATIENT)
Dept: PET IMAGING | Facility: HOSPITAL | Age: 75
End: 2025-06-05
Payer: MEDICARE

## 2025-06-05 ENCOUNTER — HOSPITAL ENCOUNTER (OUTPATIENT)
Dept: PET IMAGING | Facility: HOSPITAL | Age: 75
Discharge: HOME | End: 2025-06-05
Admitting: PHYSICIAN ASSISTANT
Payer: MEDICARE

## 2025-06-05 DIAGNOSIS — Z78.0 MENOPAUSE: Primary | ICD-10-CM

## 2025-06-05 PROCEDURE — 77080 DXA BONE DENSITY AXIAL: CPT

## 2025-06-06 ENCOUNTER — TELEPHONE (OUTPATIENT)
Dept: FAMILY MEDICINE CLINIC | Facility: CLINIC | Age: 75
End: 2025-06-06
Payer: MEDICARE

## 2025-06-06 NOTE — TELEPHONE ENCOUNTER
CALLED PATIENT AND LVM FOR PATIENT TO RETURN CALL TO GET PATIENT SCHEDULED FOR MEDICARE WELLNESS VISIT      OK FOR HUB TO SCHEDULE PATIENT FOR MEDICARE WELLNESS VISIT.

## 2025-06-13 ENCOUNTER — HOSPITAL ENCOUNTER (OUTPATIENT)
Dept: CARDIOLOGY | Facility: HOSPITAL | Age: 75
Discharge: HOME | End: 2025-06-13
Payer: MEDICARE

## 2025-06-13 VITALS
DIASTOLIC BLOOD PRESSURE: 78 MMHG | BODY MASS INDEX: 32.6 KG/M2 | SYSTOLIC BLOOD PRESSURE: 136 MMHG | WEIGHT: 184 LBS | HEIGHT: 63 IN

## 2025-06-13 DIAGNOSIS — I27.20 PULMONARY HYPERTENSION: ICD-10-CM

## 2025-06-13 DIAGNOSIS — I36.1 NONRHEUMATIC TRICUSPID VALVE REGURGITATION: ICD-10-CM

## 2025-06-13 DIAGNOSIS — I34.0 NONRHEUMATIC MITRAL VALVE REGURGITATION: ICD-10-CM

## 2025-06-13 LAB
AORTIC ARCH: 2 CM
AORTIC DIMENSIONLESS INDEX: 0.81 (DI)
ASCENDING AORTA: 3.3 CM
AV MEAN PRESS GRAD SYS DOP V1V2: 7.3 MMHG
AV VMAX SYS DOP: 182.1 CM/SEC
BH CV ECHO MEAS - ACS: 2.08 CM
BH CV ECHO MEAS - AO MAX PG: 13.3 MMHG
BH CV ECHO MEAS - AO ROOT DIAM: 3.2 CM
BH CV ECHO MEAS - AO V2 VTI: 43.8 CM
BH CV ECHO MEAS - AVA(I,D): 2.5 CM2
BH CV ECHO MEAS - EDV(CUBED): 66.6 ML
BH CV ECHO MEAS - EDV(MOD-SP2): 127 ML
BH CV ECHO MEAS - EDV(MOD-SP4): 128 ML
BH CV ECHO MEAS - EF(MOD-SP2): 66.1 %
BH CV ECHO MEAS - EF(MOD-SP4): 64.1 %
BH CV ECHO MEAS - ESV(CUBED): 20 ML
BH CV ECHO MEAS - ESV(MOD-SP2): 43 ML
BH CV ECHO MEAS - ESV(MOD-SP4): 46 ML
BH CV ECHO MEAS - FS: 33 %
BH CV ECHO MEAS - IVS/LVPW: 1.05 CM
BH CV ECHO MEAS - IVSD: 0.98 CM
BH CV ECHO MEAS - LAT PEAK E' VEL: 10.2 CM/SEC
BH CV ECHO MEAS - LV DIASTOLIC VOL/BSA (35-75): 68.6 CM2
BH CV ECHO MEAS - LV MASS(C)D: 122.6 GRAMS
BH CV ECHO MEAS - LV MAX PG: 10.1 MMHG
BH CV ECHO MEAS - LV MEAN PG: 4.6 MMHG
BH CV ECHO MEAS - LV SYSTOLIC VOL/BSA (12-30): 24.6 CM2
BH CV ECHO MEAS - LV V1 MAX: 158.9 CM/SEC
BH CV ECHO MEAS - LV V1 VTI: 35.4 CM
BH CV ECHO MEAS - LVIDD: 4.1 CM
BH CV ECHO MEAS - LVIDS: 2.7 CM
BH CV ECHO MEAS - LVOT AREA: 3.1 CM2
BH CV ECHO MEAS - LVOT DIAM: 2 CM
BH CV ECHO MEAS - LVPWD: 0.94 CM
BH CV ECHO MEAS - MED PEAK E' VEL: 5.8 CM/SEC
BH CV ECHO MEAS - MR MAX PG: 116.8 MMHG
BH CV ECHO MEAS - MR MAX VEL: 540.5 CM/SEC
BH CV ECHO MEAS - MV A DUR: 0.13 SEC
BH CV ECHO MEAS - MV A MAX VEL: 105.9 CM/SEC
BH CV ECHO MEAS - MV DEC SLOPE: 627.3 CM/SEC2
BH CV ECHO MEAS - MV DEC TIME: 0.15 SEC
BH CV ECHO MEAS - MV E MAX VEL: 116 CM/SEC
BH CV ECHO MEAS - MV E/A: 1.1
BH CV ECHO MEAS - MV MAX PG: 6.9 MMHG
BH CV ECHO MEAS - MV MEAN PG: 2.36 MMHG
BH CV ECHO MEAS - MV P1/2T: 63.4 MSEC
BH CV ECHO MEAS - MV V2 VTI: 39.3 CM
BH CV ECHO MEAS - MVA(P1/2T): 3.5 CM2
BH CV ECHO MEAS - MVA(VTI): 2.8 CM2
BH CV ECHO MEAS - PA ACC TIME: 0.12 SEC
BH CV ECHO MEAS - PA V2 MAX: 100.8 CM/SEC
BH CV ECHO MEAS - PULM A REVS DUR: 0.12 SEC
BH CV ECHO MEAS - PULM A REVS VEL: 26.7 CM/SEC
BH CV ECHO MEAS - PULM DIAS VEL: 35.7 CM/SEC
BH CV ECHO MEAS - PULM S/D: 1.32
BH CV ECHO MEAS - PULM SYS VEL: 47.1 CM/SEC
BH CV ECHO MEAS - QP/QS: 0.65
BH CV ECHO MEAS - RAP SYSTOLE: 3 MMHG
BH CV ECHO MEAS - RV MAX PG: 2.9 MMHG
BH CV ECHO MEAS - RV V1 MAX: 84.5 CM/SEC
BH CV ECHO MEAS - RV V1 VTI: 23.4 CM
BH CV ECHO MEAS - RVOT DIAM: 1.98 CM
BH CV ECHO MEAS - RVSP: 47.4 MMHG
BH CV ECHO MEAS - SV(LVOT): 111.1 ML
BH CV ECHO MEAS - SV(MOD-SP2): 84 ML
BH CV ECHO MEAS - SV(MOD-SP4): 82 ML
BH CV ECHO MEAS - SV(RVOT): 71.9 ML
BH CV ECHO MEAS - SVI(LVOT): 59.5 ML/M2
BH CV ECHO MEAS - SVI(MOD-SP2): 45 ML/M2
BH CV ECHO MEAS - SVI(MOD-SP4): 43.9 ML/M2
BH CV ECHO MEAS - TAPSE (>1.6): 2.6 CM
BH CV ECHO MEAS - TR MAX PG: 44.4 MMHG
BH CV ECHO MEAS - TR MAX VEL: 333.2 CM/SEC
BH CV ECHO MEASUREMENTS AVERAGE E/E' RATIO: 14.5
BH CV XLRA - RV BASE: 3.4 CM
BH CV XLRA - RV LENGTH: 7 CM
BH CV XLRA - TDI S': 7.9 CM/SEC
LEFT ATRIUM VOLUME INDEX: 47.8 ML/M2
LV EF BIPLANE MOD: 65.3 %
SINUS: 3 CM
STJ: 2.5 CM

## 2025-06-13 PROCEDURE — 93306 TTE W/DOPPLER COMPLETE: CPT

## 2025-06-16 ENCOUNTER — RESULTS FOLLOW-UP (OUTPATIENT)
Dept: CARDIOLOGY | Age: 75
End: 2025-06-16
Payer: MEDICARE

## 2025-06-16 DIAGNOSIS — I27.20 PULMONARY HYPERTENSION: Primary | ICD-10-CM

## 2025-06-16 NOTE — PROGRESS NOTES
I reviewed the echo results and it is essentially unchanged.  With the elevated left atrial pressure and moderate pulmonary hypertension, she may benefit from low-dose diuretic.  She is a kidney transplant recipient.  Last BUN was mildly elevated and creatinine normal.  She denies shortness of breath.  She has declined referral to sleep medicine in the past.    Dr. Capellan-please review this message.  Show I just leave things the same or try low-dose diuretic?

## 2025-06-17 NOTE — PROGRESS NOTES
I spoke with patient via phone.  LVEF normal, grade 2 diastolic dysfunction, left atrium cavity severely dilated, moderate-severe pulmonary hypertension.  She denies shortness of breath.  I discussed with Dr. Capellan and she recommended trying low-dose diuretic.  I spoke with patient and she verbalizes understanding.  I will reach out to her kidney transplant MD to see if that would be okay to start diuretic therapy.

## 2025-06-20 RX ORDER — FUROSEMIDE 40 MG/1
40 TABLET ORAL DAILY
Qty: 90 TABLET | Refills: 0 | Status: SHIPPED | OUTPATIENT
Start: 2025-06-20

## 2025-06-20 NOTE — PROGRESS NOTES
Please call patient and let her know that I discussed with her kidney transplant doctor.  She can start diuretic therapy.  Will start furosemide 40 mg 1 tablet daily.  Repeat BMP in 7 to 10 days.  Order has been placed.  Follow-up with me in 4 to 6 weeks.  Thank you

## 2025-06-20 NOTE — TELEPHONE ENCOUNTER
I discussed with her kidney transplant MD, Dr. Richmond.  Okay to start furosemide 40 mg daily and repeat BMP in 1 week.  Follow-up with me in 4 to 6 weeks.  Patient informed.

## 2025-06-23 ENCOUNTER — OFFICE VISIT (OUTPATIENT)
Dept: SURGERY | Facility: CLINIC | Age: 75
End: 2025-06-23
Payer: MEDICARE

## 2025-06-23 VITALS
DIASTOLIC BLOOD PRESSURE: 75 MMHG | HEART RATE: 66 BPM | HEIGHT: 63 IN | WEIGHT: 183.6 LBS | BODY MASS INDEX: 32.53 KG/M2 | OXYGEN SATURATION: 95 % | SYSTOLIC BLOOD PRESSURE: 130 MMHG

## 2025-06-23 DIAGNOSIS — Z12.11 ENCOUNTER FOR COLORECTAL CANCER SCREENING: Primary | ICD-10-CM

## 2025-06-23 DIAGNOSIS — Z12.12 ENCOUNTER FOR COLORECTAL CANCER SCREENING: Primary | ICD-10-CM

## 2025-06-23 PROCEDURE — 99203 OFFICE O/P NEW LOW 30 MIN: CPT | Performed by: SURGERY

## 2025-06-23 PROCEDURE — 1160F RVW MEDS BY RX/DR IN RCRD: CPT | Performed by: SURGERY

## 2025-06-23 PROCEDURE — 1159F MED LIST DOCD IN RCRD: CPT | Performed by: SURGERY

## 2025-06-23 NOTE — PROGRESS NOTES
Colorectal & General Surgery  Consultation    Patient: Diana Gee  YOB: 1950  MRN: [id number]      Assessment & Plan  Diana Gee is a 75 y.o. female with no high risk or concerning symptoms for colorectal cancer screening purposes but presents with a positive shield test.  Discussed that that she will test is a new assay and that we do not completely understand its data yet.  Regardless, I have recommended that she proceed with a screening colonoscopy.  We discussed risk, benefits, and alternatives the procedure.  Informed consent was obtained.    Referring Provider: LINDSEY Steiner     Reason for Consultation: Positive shield test    History of Present Illness   Diana Gee is a 75 y.o. female who presents the office with a positive shield test.  She has no symptoms otherwise.    Most recent colonoscopy: 2019, poor prep.  2017, normal.    Past Medical History   Past Medical History:   Diagnosis Date    Abnormal glucose     Acute bronchitis     Acute upper respiratory infection     Anemia     C. 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     Declared viral free in Sept '14  Dr Julio Serna  [facility]    Cellulitis of foot     Cerumen impaction     Charcot's joint     Deep vein thrombosis     Depression with anxiety     Diabetes mellitus     Diastolic dysfunction     Disease of thyroid gland     Failed kidney transplant     Gastric ulcer     GERD (gastroesophageal reflux disease)     Gout     Grade II diastolic dysfunction 06/26/2019    Graves disease     Healthcare associated bacterial pneumonia 02/02/2022    Healthcare-associated pneumonia 04/27/2021    Heart murmur     Hepatitis C     Hyperlipidemia     Hypertension     Hypothyroidism     Insomnia     Kidney carcinoma     Kidney failure     MR (mitral regurgitation)     moderate, with cunningham dysfctn, severe LAE, mod OLENA, aortic thickening without insuff- Dr Horn    Neuropathy     Osteomyelitis  of toe     Osteoporosis     Peripheral edema     Pes planus     Postoperative wound infection     Subconjunctival hemorrhage     Transplant recipient ,     BILATERAL KIDNEYS    Type 2 diabetes mellitus with diabetic mononeuropathy         Past Surgical History   Past Surgical History:   Procedure Laterality Date    AMPUTATION DIGIT Left 2016    Procedure: LT 4TH TOE AMPUTATION;  Surgeon: Navjot Gandara MD;  Location:  [facility];  Service:     AMPUTATION DIGIT Left 12/15/2016    Procedure: LT THIRD TOE AMPUTATION;  Surgeon: Navjot Gandara MD;  Location: [facility] MAIN OR;  Service:     ARTERIOVENOUS FISTULA Bilateral -LEFT ARM, -RIGHT ARM    CHOLECYSTECTOMY      COLONOSCOPY N/A 2019    Procedure: COLONOSCOPY WITHOUT PREP WITH BIOPSIES;  Surgeon: Cam Shin MD;  Location: [facility] ENDOSCOPY;  Service: Gastroenterology    EYE SURGERY Bilateral ,     CATARACTS    JOINT REPLACEMENT Left     SHOULDER    NEPHRECTOMY Right 2008    CANCER    SKIN GRAFT Right     LEG    TOE AMPUTATION Left     FIFTH TOE    TONSILLECTOMY  1964    TRANSPLANTATION RENAL Left 1995-RIGHT KIDNEY TRANSPLANT    TUBAL ABDOMINAL LIGATION         Social History  Social History     Socioeconomic History    Marital status:      Spouse name: Calvin   Tobacco Use    Smoking status: Former     Current packs/day: 0.00     Average packs/day: 0.5 packs/day for 4.0 years (2.0 ttl pk-yrs)     Types: Cigarettes     Start date: 1989     Quit date: 1993     Years since quittin.4    Smokeless tobacco: Never   Vaping Use    Vaping status: Never Used   Substance and Sexual Activity    Alcohol use: No     Comment: caffeine use: 1 cup 3 times wkly    Drug use: No    Sexual activity: Not Currently       Family History  Family History   Problem Relation Age of Onset    Hypothyroidism Mother     COPD Mother     COPD Father        Colorectal cancer family history: None    Review  of Systems  Negative except as documented in the HPI.     Allergies  Allergies   Allergen Reactions    Heparin Hives and Myalgia     Tremors,fever    Levofloxacin Hives and Anaphylaxis     Throat swelling no treatment given       Medications    Current Outpatient Medications:     Accu-Chek FastClix Lancets misc, , Disp: , Rfl:     Accu-Chek SmartView test strip, , Disp: , Rfl:     aspirin 81 MG EC tablet, Take 1 tablet by mouth Daily., Disp: , Rfl:     azaTHIOprine (IMURAN) 50 MG tablet, Take 1 tablet by mouth Daily. 3 tablets once daily, Disp: , Rfl:     BD Pen Needle Paula 2nd Gen 32G X 4 MM misc, , Disp: , Rfl:     carvedilol (COREG) 25 MG tablet, Take 1 tablet by mouth 2 (Two) Times a Day With Meals., Disp: , Rfl:     Cholecalciferol (VITAMIN D3) 5000 units tablet tablet, Take 1 tablet by mouth Daily., Disp: , Rfl:     escitalopram (LEXAPRO) 20 MG tablet, TAKE ONE TABLET BY MOUTH EVERY DAY, Disp: 90 tablet, Rfl: 1    esomeprazole (nexIUM) 20 MG capsule, Take 1 capsule by mouth Every Morning Before Breakfast., Disp: , Rfl:     furosemide (LASIX) 40 MG tablet, Take 1 tablet by mouth Daily., Disp: 90 tablet, Rfl: 0    glucose blood test strip, 1 each by Other route 2 (Two) Times a Day., Disp: , Rfl:     insulin lispro (humaLOG) 100 UNIT/ML injection, Inject 4 Units under the skin into the appropriate area as directed Every Morning., Disp: , Rfl:     levothyroxine (SYNTHROID, LEVOTHROID) 125 MCG tablet, Take 1 tablet by mouth Daily. (Patient taking differently: Take 1 tablet by mouth Daily. Mon-sat hold Sunday), Disp: , Rfl:     magnesium oxide (MAG-OX) 400 MG tablet, Take 2 tablets by mouth 2 (Two) Times a Day., Disp: , Rfl:     Multiple Vitamins-Minerals (MULTIVITAMIN WITH MINERALS) tablet tablet, Take 1 tablet by mouth Daily., Disp: , Rfl:     olmesartan (BENICAR) 5 MG tablet, Take 1 tablet by mouth Daily., Disp: , Rfl:     predniSONE (DELTASONE) 10 MG tablet, Take 2.5 mg by mouth Daily., Disp: , Rfl:      rosuvastatin (CRESTOR) 40 MG tablet, Take 1 tablet by mouth Daily., Disp: , Rfl:     tacrolimus (PROGRAF) 1 MG capsule, Take 1 capsule by mouth 2 (Two) Times a Day., Disp: , Rfl:     Tresiba FlexTouch 100 UNIT/ML solution pen-injector injection, INJECT 6 UNITS UNDER THE SKIN NIGHTLY. PEN EXPIRES 56 DAYS AFTER OPENING, Disp: , Rfl:     nystatin 041219 UNIT/GM topical powder, As Needed. (Patient not taking: Reported on 5/28/2025), Disp: , Rfl:     valACYclovir (VALTREX) 1000 MG tablet, Take 1 tablet by mouth 3 (Three) Times a Day. (Patient not taking: Reported on 6/23/2025), Disp: 21 tablet, Rfl: 0    Vital Signs  Vitals:    06/23/25 1454   BP: 130/75   Pulse: 66   SpO2: 95%        Physical Exam  Constitutional: Resting comfortably, no acute distress  Neck: Supple, trachea midline  Respiratory: No increased work of breathing, Symmetric excursion  Cardiovascular: Well pefursed, no jugular venous distention evident   Abdominal:  Soft, non-tender, non-distended  Lymphatics: No cervical or suprascapular adenopathy  Skin: Warm, dry, no rash on visualized skin surfaces  Musculoskeletal: Symmetric strength, no obvious gross abnormalities  Psychiatric: Alert and oriented ×3, normal affect     Laboratory Results  I have personally reviewed positive shield test.  CBC with WC 4, Humoryl 12, platelets 182.  CMP with creatinine 0.93, albumin 3.5.    Radiology  None to review    Endoscopy  I have personally reviewed colonoscopy report from [address] in 2017 was normal.         Alfa Mata MD  Colorectal & General Surgery  [facility]    [address], [address]  [place], [place], [address]  P: [phone]  F: [phone]

## 2025-06-23 NOTE — H&P (VIEW-ONLY)
Colorectal & General Surgery  Consultation    Patient: Diana Gee  YOB: 1950  MRN: [id number]      Assessment & Plan  Diana Gee is a 75 y.o. female with no high risk or concerning symptoms for colorectal cancer screening purposes but presents with a positive shield test.  Discussed that that she will test is a new assay and that we do not completely understand its data yet.  Regardless, I have recommended that she proceed with a screening colonoscopy.  We discussed risk, benefits, and alternatives the procedure.  Informed consent was obtained.    Referring Provider: LINDSEY Steiner     Reason for Consultation: Positive shield test    History of Present Illness   Diana Gee is a 75 y.o. female who presents the office with a positive shield test.  She has no symptoms otherwise.    Most recent colonoscopy: 2019, poor prep.  2017, normal.    Past Medical History   Past Medical History:   Diagnosis Date    Abnormal glucose     Acute bronchitis     Acute upper respiratory infection     Anemia     C. difficile diarrhea 2008    [facility]    Carrier of viral hepatitis C     Declared viral free in Sept '14  Dr Julio Serna  [facility]    Cellulitis of foot     Cerumen impaction     Charcot's joint     Deep vein thrombosis     Depression with anxiety     Diabetes mellitus     Diastolic dysfunction     Disease of thyroid gland     Failed kidney transplant     Gastric ulcer     GERD (gastroesophageal reflux disease)     Gout     Grade II diastolic dysfunction 06/26/2019    Graves disease     Healthcare associated bacterial pneumonia 02/02/2022    Healthcare-associated pneumonia 04/27/2021    Heart murmur     Hepatitis C     Hyperlipidemia     Hypertension     Hypothyroidism     Insomnia     Kidney carcinoma     Kidney failure     MR (mitral regurgitation)     moderate, with cunningham dysfctn, severe LAE, mod OLENA, aortic thickening without insuff- Dr Horn    Neuropathy     Osteomyelitis  of toe     Osteoporosis     Peripheral edema     Pes planus     Postoperative wound infection     Subconjunctival hemorrhage     Transplant recipient ,     BILATERAL KIDNEYS    Type 2 diabetes mellitus with diabetic mononeuropathy         Past Surgical History   Past Surgical History:   Procedure Laterality Date    AMPUTATION DIGIT Left 2016    Procedure: LT 4TH TOE AMPUTATION;  Surgeon: Navjot Gandara MD;  Location:  [facility];  Service:     AMPUTATION DIGIT Left 12/15/2016    Procedure: LT THIRD TOE AMPUTATION;  Surgeon: Navjot Gandara MD;  Location: [facility] MAIN OR;  Service:     ARTERIOVENOUS FISTULA Bilateral -LEFT ARM, -RIGHT ARM    CHOLECYSTECTOMY      COLONOSCOPY N/A 2019    Procedure: COLONOSCOPY WITHOUT PREP WITH BIOPSIES;  Surgeon: Cam Shin MD;  Location: [facility] ENDOSCOPY;  Service: Gastroenterology    EYE SURGERY Bilateral ,     CATARACTS    JOINT REPLACEMENT Left     SHOULDER    NEPHRECTOMY Right 2008    CANCER    SKIN GRAFT Right     LEG    TOE AMPUTATION Left     FIFTH TOE    TONSILLECTOMY  1964    TRANSPLANTATION RENAL Left 1995-RIGHT KIDNEY TRANSPLANT    TUBAL ABDOMINAL LIGATION         Social History  Social History     Socioeconomic History    Marital status:      Spouse name: Calvin   Tobacco Use    Smoking status: Former     Current packs/day: 0.00     Average packs/day: 0.5 packs/day for 4.0 years (2.0 ttl pk-yrs)     Types: Cigarettes     Start date: 1989     Quit date: 1993     Years since quittin.4    Smokeless tobacco: Never   Vaping Use    Vaping status: Never Used   Substance and Sexual Activity    Alcohol use: No     Comment: caffeine use: 1 cup 3 times wkly    Drug use: No    Sexual activity: Not Currently       Family History  Family History   Problem Relation Age of Onset    Hypothyroidism Mother     COPD Mother     COPD Father        Colorectal cancer family history: None    Review  of Systems  Negative except as documented in the HPI.     Allergies  Allergies   Allergen Reactions    Heparin Hives and Myalgia     Tremors,fever    Levofloxacin Hives and Anaphylaxis     Throat swelling no treatment given       Medications    Current Outpatient Medications:     Accu-Chek FastClix Lancets misc, , Disp: , Rfl:     Accu-Chek SmartView test strip, , Disp: , Rfl:     aspirin 81 MG EC tablet, Take 1 tablet by mouth Daily., Disp: , Rfl:     azaTHIOprine (IMURAN) 50 MG tablet, Take 1 tablet by mouth Daily. 3 tablets once daily, Disp: , Rfl:     BD Pen Needle Paula 2nd Gen 32G X 4 MM misc, , Disp: , Rfl:     carvedilol (COREG) 25 MG tablet, Take 1 tablet by mouth 2 (Two) Times a Day With Meals., Disp: , Rfl:     Cholecalciferol (VITAMIN D3) 5000 units tablet tablet, Take 1 tablet by mouth Daily., Disp: , Rfl:     escitalopram (LEXAPRO) 20 MG tablet, TAKE ONE TABLET BY MOUTH EVERY DAY, Disp: 90 tablet, Rfl: 1    esomeprazole (nexIUM) 20 MG capsule, Take 1 capsule by mouth Every Morning Before Breakfast., Disp: , Rfl:     furosemide (LASIX) 40 MG tablet, Take 1 tablet by mouth Daily., Disp: 90 tablet, Rfl: 0    glucose blood test strip, 1 each by Other route 2 (Two) Times a Day., Disp: , Rfl:     insulin lispro (humaLOG) 100 UNIT/ML injection, Inject 4 Units under the skin into the appropriate area as directed Every Morning., Disp: , Rfl:     levothyroxine (SYNTHROID, LEVOTHROID) 125 MCG tablet, Take 1 tablet by mouth Daily. (Patient taking differently: Take 1 tablet by mouth Daily. Mon-sat hold Sunday), Disp: , Rfl:     magnesium oxide (MAG-OX) 400 MG tablet, Take 2 tablets by mouth 2 (Two) Times a Day., Disp: , Rfl:     Multiple Vitamins-Minerals (MULTIVITAMIN WITH MINERALS) tablet tablet, Take 1 tablet by mouth Daily., Disp: , Rfl:     olmesartan (BENICAR) 5 MG tablet, Take 1 tablet by mouth Daily., Disp: , Rfl:     predniSONE (DELTASONE) 10 MG tablet, Take 2.5 mg by mouth Daily., Disp: , Rfl:      rosuvastatin (CRESTOR) 40 MG tablet, Take 1 tablet by mouth Daily., Disp: , Rfl:     tacrolimus (PROGRAF) 1 MG capsule, Take 1 capsule by mouth 2 (Two) Times a Day., Disp: , Rfl:     Tresiba FlexTouch 100 UNIT/ML solution pen-injector injection, INJECT 6 UNITS UNDER THE SKIN NIGHTLY. PEN EXPIRES 56 DAYS AFTER OPENING, Disp: , Rfl:     nystatin 577152 UNIT/GM topical powder, As Needed. (Patient not taking: Reported on 5/28/2025), Disp: , Rfl:     valACYclovir (VALTREX) 1000 MG tablet, Take 1 tablet by mouth 3 (Three) Times a Day. (Patient not taking: Reported on 6/23/2025), Disp: 21 tablet, Rfl: 0    Vital Signs  Vitals:    06/23/25 1454   BP: 130/75   Pulse: 66   SpO2: 95%        Physical Exam  Constitutional: Resting comfortably, no acute distress  Neck: Supple, trachea midline  Respiratory: No increased work of breathing, Symmetric excursion  Cardiovascular: Well pefursed, no jugular venous distention evident   Abdominal:  Soft, non-tender, non-distended  Lymphatics: No cervical or suprascapular adenopathy  Skin: Warm, dry, no rash on visualized skin surfaces  Musculoskeletal: Symmetric strength, no obvious gross abnormalities  Psychiatric: Alert and oriented ×3, normal affect     Laboratory Results  I have personally reviewed positive shield test.  CBC with WC 4, Humoryl 12, platelets 182.  CMP with creatinine 0.93, albumin 3.5.    Radiology  None to review    Endoscopy  I have personally reviewed colonoscopy report from [address] in 2017 was normal.         Alfa Mata MD  Colorectal & General Surgery  [facility]    [address], [address]  [place], [place], [address]  P: [phone]  F: [phone]

## 2025-07-09 ENCOUNTER — TELEPHONE (OUTPATIENT)
Dept: SURGERY | Facility: CLINIC | Age: 75
End: 2025-07-09
Payer: MEDICARE

## 2025-07-09 NOTE — TELEPHONE ENCOUNTER
Called and left msg for patient advising arrival time for cscope on 7/16 has been moved up and she is to arrive at 1:30pm.

## 2025-07-14 ENCOUNTER — ANESTHESIA EVENT (OUTPATIENT)
Dept: PERIOP | Facility: HOSPITAL | Age: 75
End: 2025-07-14
Payer: MEDICARE

## 2025-07-16 ENCOUNTER — HOSPITAL ENCOUNTER (OUTPATIENT)
Facility: HOSPITAL | Age: 75
Setting detail: HOSPITAL OUTPATIENT SURGERY
Discharge: HOME | End: 2025-07-16
Attending: SURGERY | Admitting: SURGERY
Payer: MEDICARE

## 2025-07-16 ENCOUNTER — ANESTHESIA (OUTPATIENT)
Dept: PERIOP | Facility: HOSPITAL | Age: 75
End: 2025-07-16
Payer: MEDICARE

## 2025-07-16 VITALS
TEMPERATURE: 97.8 F | DIASTOLIC BLOOD PRESSURE: 90 MMHG | SYSTOLIC BLOOD PRESSURE: 177 MMHG | RESPIRATION RATE: 20 BRPM | BODY MASS INDEX: 32.74 KG/M2 | WEIGHT: 184.8 LBS | HEART RATE: 52 BPM | OXYGEN SATURATION: 96 %

## 2025-07-16 DIAGNOSIS — Z12.12 ENCOUNTER FOR COLORECTAL CANCER SCREENING: ICD-10-CM

## 2025-07-16 DIAGNOSIS — Z12.11 ENCOUNTER FOR COLORECTAL CANCER SCREENING: ICD-10-CM

## 2025-07-16 LAB — GLUCOSE BLDC GLUCOMTR-MCNC: 106 MG/DL (ref 70–130)

## 2025-07-16 PROCEDURE — 25010000002 PROPOFOL 200 MG/20ML EMULSION: Performed by: NURSE ANESTHETIST, CERTIFIED REGISTERED

## 2025-07-16 PROCEDURE — 88305 TISSUE EXAM BY PATHOLOGIST: CPT | Performed by: SURGERY

## 2025-07-16 PROCEDURE — 45380 COLONOSCOPY AND BIOPSY: CPT | Performed by: SURGERY

## 2025-07-16 PROCEDURE — 25810000003 LACTATED RINGERS PER 1000 ML: Performed by: NURSE ANESTHETIST, CERTIFIED REGISTERED

## 2025-07-16 PROCEDURE — 25010000002 LIDOCAINE 2% SOLUTION: Performed by: NURSE ANESTHETIST, CERTIFIED REGISTERED

## 2025-07-16 PROCEDURE — 82948 REAGENT STRIP/BLOOD GLUCOSE: CPT

## 2025-07-16 RX ORDER — PROPOFOL 10 MG/ML
INJECTION, EMULSION INTRAVENOUS AS NEEDED
Status: DISCONTINUED | OUTPATIENT
Start: 2025-07-16 | End: 2025-07-16 | Stop reason: SURG

## 2025-07-16 RX ORDER — SODIUM CHLORIDE, SODIUM LACTATE, POTASSIUM CHLORIDE, CALCIUM CHLORIDE 600; 310; 30; 20 MG/100ML; MG/100ML; MG/100ML; MG/100ML
9 INJECTION, SOLUTION INTRAVENOUS CONTINUOUS
Status: DISCONTINUED | OUTPATIENT
Start: 2025-07-16 | End: 2025-07-16 | Stop reason: HOSPADM

## 2025-07-16 RX ORDER — SODIUM CHLORIDE, SODIUM LACTATE, POTASSIUM CHLORIDE, CALCIUM CHLORIDE 600; 310; 30; 20 MG/100ML; MG/100ML; MG/100ML; MG/100ML
100 INJECTION, SOLUTION INTRAVENOUS ONCE
Status: DISCONTINUED | OUTPATIENT
Start: 2025-07-16 | End: 2025-07-16 | Stop reason: HOSPADM

## 2025-07-16 RX ORDER — LIDOCAINE HYDROCHLORIDE 10 MG/ML
0.5 INJECTION, SOLUTION EPIDURAL; INFILTRATION; INTRACAUDAL; PERINEURAL ONCE AS NEEDED
Status: DISCONTINUED | OUTPATIENT
Start: 2025-07-16 | End: 2025-07-16 | Stop reason: HOSPADM

## 2025-07-16 RX ORDER — ONDANSETRON 2 MG/ML
4 INJECTION INTRAMUSCULAR; INTRAVENOUS ONCE AS NEEDED
Status: DISCONTINUED | OUTPATIENT
Start: 2025-07-16 | End: 2025-07-16 | Stop reason: HOSPADM

## 2025-07-16 RX ORDER — SODIUM CHLORIDE 0.9 % (FLUSH) 0.9 %
10 SYRINGE (ML) INJECTION AS NEEDED
Status: DISCONTINUED | OUTPATIENT
Start: 2025-07-16 | End: 2025-07-16 | Stop reason: HOSPADM

## 2025-07-16 RX ORDER — LIDOCAINE HYDROCHLORIDE 20 MG/ML
INJECTION, SOLUTION INFILTRATION; PERINEURAL AS NEEDED
Status: DISCONTINUED | OUTPATIENT
Start: 2025-07-16 | End: 2025-07-16 | Stop reason: SURG

## 2025-07-16 RX ADMIN — SODIUM CHLORIDE, POTASSIUM CHLORIDE, SODIUM LACTATE AND CALCIUM CHLORIDE 9 ML/HR: 600; 310; 30; 20 INJECTION, SOLUTION INTRAVENOUS at 11:48

## 2025-07-16 RX ADMIN — PROPOFOL 50 MG: 10 INJECTION, EMULSION INTRAVENOUS at 13:50

## 2025-07-16 RX ADMIN — PROPOFOL 50 MG: 10 INJECTION, EMULSION INTRAVENOUS at 13:55

## 2025-07-16 RX ADMIN — PROPOFOL 30 MG: 10 INJECTION, EMULSION INTRAVENOUS at 14:01

## 2025-07-16 RX ADMIN — PROPOFOL 50 MG: 10 INJECTION, EMULSION INTRAVENOUS at 13:59

## 2025-07-16 RX ADMIN — LIDOCAINE HYDROCHLORIDE 80 MG: 20 INJECTION, SOLUTION INFILTRATION; PERINEURAL at 13:41

## 2025-07-16 RX ADMIN — PROPOFOL 100 MG: 10 INJECTION, EMULSION INTRAVENOUS at 13:41

## 2025-07-16 RX ADMIN — PROPOFOL 50 MG: 10 INJECTION, EMULSION INTRAVENOUS at 13:45

## 2025-07-16 NOTE — ANESTHESIA POSTPROCEDURE EVALUATION
Patient: Diana Gee    Procedure Summary       Date: 07/16/25 Room / Location:  [facility] /  [facility]    Anesthesia Start: 1337 Anesthesia Stop:     Procedure: COLONOSCOPY WITH POLYPECTOMY Diagnosis:       Encounter for colorectal cancer screening      (Encounter for colorectal cancer screening [Z12.11, Z12.12])    Surgeons: Houston Mata MD Provider: Carolyn Baumann CRNA    Anesthesia Type: MAC ASA Status: 3            Anesthesia Type: MAC    Vitals  Vitals Value Taken Time   /95 07/16/25 14:37   Temp 97.8 °F (36.6 °C) 07/16/25 14:12   Pulse 54 07/16/25 14:37   Resp 16 07/16/25 14:37   SpO2 95 % 07/16/25 14:37           Post Anesthesia Care and Evaluation    Patient location during evaluation: PHASE II  Patient participation: complete - patient participated  Level of consciousness: awake and alert  Pain score: 0  Pain management: adequate    Airway patency: patent  Anesthetic complications: No anesthetic complications  PONV Status: none  Cardiovascular status: acceptable  Respiratory status: acceptable  Hydration status: acceptable

## 2025-07-16 NOTE — ANESTHESIA PREPROCEDURE EVALUATION
Anesthesia Evaluation     Patient summary reviewed and Nursing notes reviewed   no history of anesthetic complications:   NPO Solid Status: > 8 hours  NPO Liquid Status: > 8 hours           Airway   Mallampati: II  TM distance: >3 FB  Neck ROM: full  No difficulty expected  Dental    (+) partials    Pulmonary     breath sounds clear to auscultation  (+) a smoker Former, cigarettes,  Cardiovascular   Exercise tolerance: good (4-7 METS)    ECG reviewed  PT is on anticoagulation therapy  Patient on routine beta blocker and Beta blocker given within 24 hours of surgery  Rhythm: regular  Rate: normal    (+) hypertension, valvular problems/murmurs murmur and MR, CAD, PVD, DVT (right LE and right neck) resolved, hyperlipidemia    ROS comment: ECG 12 Lead     Date/Time: 5/28/2025 2:19 PM  Performed by: Tawanna Schwarz APRN     Authorized by: Tawanna Schwarz APRN  Comparison: compared with previous ECG from 5/22/2024  Similar to previous ECG  Rhythm: sinus rhythm  Rate: normal  BPM: 64  Conduction: conduction normal  ST Elevation: V1, V2 and V3  T Waves: T waves normal  QRS axis: normal  Other findings: non-specific ST-T wave changes     Clinical impression: non-specific ECG      2025  Interpretation Summary       ·  Left ventricular systolic function is normal. Calculated left ventricular EF = 65.3%  ·  Left ventricular diastolic function is consistent with (grade II w/high LAP) pseudonormalization.  ·  Mild-moderate mitral regurgitation.  ·  Estimated right ventricular systolic pressure from tricuspid regurgitation is moderately elevated (45-55 mmHg).  ·  The left atrial cavity is severely dilated.  Gross normal RA and IVC size.      2020 stress test  nterpretation Summary    · Myocardial perfusion imaging indicates a normal myocardial perfusion study with no evidence of ischemia.  · Left ventricular ejection fraction is hyperdynamic (Calculated EF > 70%).  · Impressions are consistent with a low risk study.       Neuro/Psych  (+) seizures (during shunt placement 1996 fine since), numbness (livier feet), psychiatric history Anxiety and Depression  GI/Hepatic/Renal/Endo    (+) obesity, GERD well controlled, PUD, hepatitis (treated) C, liver disease, renal disease (kidney cancer with right kidney removed followed by renal failure and a faild renal transplant.  Pt 2nd transplant 2011 with current success.)-, diabetes mellitus type 2 well controlled, thyroid problem hypothyroidism    Musculoskeletal     Abdominal   (+) obese   Substance History - negative use     OB/GYN          Other   arthritis (OA back),   history of cancer (kidney)    ROS/Med Hx Other: Right and left shunt livier UEs not used since 2011                Anesthesia Plan    ASA 3     MAC     intravenous induction     Anesthetic plan, risks, benefits, and alternatives have been provided, discussed and informed consent has been obtained with: patient.    Use of blood products discussed with patient  Consented to blood products.      CODE STATUS:

## 2025-07-16 NOTE — OP NOTE
Colorectal & General Surgery  Operative Report    Patient: Diana Gee  YOB: 1950  MRN: [id number]  DATE OF PROCEDURE: 07/16/25     PREOPERATIVE DIAGNOSIS:  Colorectal cancer screening    POSTOPERATIVE DIAGNOSIS:  Descending colon polyp    PROCEDURE:  Colonoscopy to cecum with cold forcep polypectomy    FINDINGS:  Small sessile polyps within the descending colon.  Otherwise, diverticulosis in the sigmoid colon.     RECOMMENDATIONS:   Await pathology results    SURGEON:  Alfa Mata MD    ANESTHESIA:  Monitored anesthesia care    EBL:  None    SPECIMEN:  Descending colon polyps    OPERATIVE DESCRIPTION:  The patient was brought to the endoscopy suite under the care of the nursing staff.  A preoperative timeout was performed.  Monitored anesthesia care was initiated.  A digital rectal examination was performed.  The endoscope was inserted into the anus and advanced carefully to the cecum.  The endoscope was then withdrawn and the entire mucosal surface of the colon and rectum were visualized.  Cold forcep polypectomy performed in the descending colon.  Retroflexion was performed in the rectum.  The scope was then withdrawn.    The patient tolerated the procedure well and was transferred to the postanesthesia care unit in stable condition.    Alfa Mata M.D.  Colorectal & General Surgery  [facility]    [address] [place] [address], [address]  [place], [place], [address]  P: [phone]  F: [phone]

## 2025-07-18 ENCOUNTER — OFFICE VISIT (OUTPATIENT)
Dept: CARDIOLOGY | Age: 75
End: 2025-07-18
Payer: MEDICARE

## 2025-07-18 ENCOUNTER — RESULTS FOLLOW-UP (OUTPATIENT)
Dept: PERIOP | Facility: HOSPITAL | Age: 75
End: 2025-07-18
Payer: MEDICARE

## 2025-07-18 VITALS
WEIGHT: 222.8 LBS | HEART RATE: 63 BPM | BODY MASS INDEX: 38.04 KG/M2 | HEIGHT: 64 IN | DIASTOLIC BLOOD PRESSURE: 70 MMHG | SYSTOLIC BLOOD PRESSURE: 136 MMHG | OXYGEN SATURATION: 96 %

## 2025-07-18 DIAGNOSIS — I35.1 NONRHEUMATIC AORTIC VALVE INSUFFICIENCY: ICD-10-CM

## 2025-07-18 DIAGNOSIS — I51.89 GRADE II DIASTOLIC DYSFUNCTION: Primary | ICD-10-CM

## 2025-07-18 DIAGNOSIS — Z94.0 RENAL TRANSPLANT RECIPIENT: ICD-10-CM

## 2025-07-18 DIAGNOSIS — R29.818 SUSPECTED SLEEP APNEA: ICD-10-CM

## 2025-07-18 DIAGNOSIS — I36.1 NONRHEUMATIC TRICUSPID VALVE REGURGITATION: ICD-10-CM

## 2025-07-18 DIAGNOSIS — I10 PRIMARY HYPERTENSION: ICD-10-CM

## 2025-07-18 DIAGNOSIS — I27.20 PULMONARY HYPERTENSION: ICD-10-CM

## 2025-07-18 DIAGNOSIS — E78.2 MIXED HYPERLIPIDEMIA: ICD-10-CM

## 2025-07-18 DIAGNOSIS — I49.1 PAC (PREMATURE ATRIAL CONTRACTION): ICD-10-CM

## 2025-07-18 DIAGNOSIS — Z79.4 TYPE 2 DIABETES MELLITUS WITH OTHER CIRCULATORY COMPLICATION, WITH LONG-TERM CURRENT USE OF INSULIN: ICD-10-CM

## 2025-07-18 DIAGNOSIS — I25.10 NONOBSTRUCTIVE ATHEROSCLEROSIS OF CORONARY ARTERY: ICD-10-CM

## 2025-07-18 DIAGNOSIS — I34.0 NONRHEUMATIC MITRAL VALVE REGURGITATION: ICD-10-CM

## 2025-07-18 DIAGNOSIS — E11.59 TYPE 2 DIABETES MELLITUS WITH OTHER CIRCULATORY COMPLICATION, WITH LONG-TERM CURRENT USE OF INSULIN: ICD-10-CM

## 2025-07-18 LAB
BUN SERPL-MCNC: 31 MG/DL (ref 8–23)
BUN/CREAT SERPL: 27.9 (ref 7–25)
CALCIUM SERPL-MCNC: 9.7 MG/DL (ref 8.6–10.5)
CHLORIDE SERPL-SCNC: 104 MMOL/L (ref 98–107)
CO2 SERPL-SCNC: 27.2 MMOL/L (ref 22–29)
CREAT SERPL-MCNC: 1.11 MG/DL (ref 0.57–1)
CYTO UR: NORMAL
EGFRCR SERPLBLD CKD-EPI 2021: 52.6 ML/MIN/1.73
GLUCOSE SERPL-MCNC: 138 MG/DL (ref 65–99)
LAB AP CASE REPORT: NORMAL
PATH REPORT.FINAL DX SPEC: NORMAL
PATH REPORT.GROSS SPEC: NORMAL
POTASSIUM SERPL-SCNC: 4.4 MMOL/L (ref 3.5–5.2)
SODIUM SERPL-SCNC: 140 MMOL/L (ref 136–145)

## 2025-07-18 RX ORDER — PREDNISONE 2.5 MG/1
2.5 TABLET ORAL DAILY
COMMUNITY
Start: 2025-01-03 | End: 2026-01-03

## 2025-07-18 RX ORDER — CARVEDILOL 12.5 MG/1
12.5 TABLET ORAL
COMMUNITY
Start: 2025-03-17 | End: 2026-03-17

## 2025-07-18 RX ORDER — BLOOD SUGAR DIAGNOSTIC
1 STRIP MISCELLANEOUS
COMMUNITY
Start: 2025-05-19 | End: 2025-12-05

## 2025-07-18 NOTE — PROGRESS NOTES
Date of Office Visit: 2025  Encounter Provider: ROMARIO Xiao  Place of Service: [facility] CARDIOLOGY  Patient Name: Diana Gee  :1950  Primary Cardiologist: Dr. Ragini Capellan     Chief Complaint   Patient presents with    Follow-up   :     HPI: Diana Gee is a 75 y.o. female who presents today for cardiac follow-up visit.  I have reviewed her medical records.    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  She then developed kidney cancer and her transplant failed.  In , she had a another kidney transplant.    She has a known history of type 2 diabetes mellitus, hepatitis C, DVT (), hypertension, hyperlipidemia, thyroid disease, and rheumatic fever as a child.  She has a pulmonary nodule followed by Dr. Magdiel Nye.      In May 2011, she had a heart catheterization which revealed mild nonobstructive coronary artery disease.  In , cardiac PET scan was normal.  Echocardiogram showed normal LVEF and some mild abnormalities.    In 2024, noted a heart murmur.  Echocardiogram showed the following: LVEF 66-70%, GLS -17.5%, mild to moderate left concentric hypertrophy, grade 2 diastolic dysfunction, RV borderline dilated, LA moderately dilated, trace to mild aortic regurgitation, mild to moderate mitral regurgitation, mild to moderate tricuspid regurgitation, moderate pulmonary hypertension (RVSP 52 mmHg), and trace to mild pulmonic regurgitation.  I recommended referral to [facility] sleep medicine and she declined.    In May 2025, she followed up with me.  Repeat echocardiogram showed the following: LVEF normal, grade 2 diastolic dysfunction, left atrium cavity severely dilated, left atrial volume severely increased, trace aortic regurgitation, mild to moderate mitral regurgitation, trace to mild tricuspid regurgitation, and moderate-severe pulmonary hypertension.  She denied  shortness of breath.  Dr. Capellan recommended furosemide 40 mg 1 tablet daily.  I discussed with her kidney transplant doctor, Dr. Richmond and she said that would be okay.  She was to have a repeat BMP in 1 week.    She presents today for follow-up visit.  BMP has not been completed.  She does not feel any differently taking the furosemide.  She denies shortness of breath, edema, chest pain, PND, orthopnea, palpitations, dizziness, syncope, or bleeding.  Blood pressure and heart rate are normal.      Past Medical History:   Diagnosis Date    Abnormal glucose     Acute bronchitis     Acute upper respiratory infection     Anemia     C. difficile diarrhea 2008    [facility]    Carrier of viral hepatitis C     Declared viral free in Sept '14  Dr Julio Serna  [facility]    Cellulitis of foot     Cerumen impaction     Charcot's joint     Deep vein thrombosis     Depression with anxiety     Diabetes mellitus     Diastolic dysfunction     Disease of thyroid gland     Failed kidney transplant     Gastric ulcer     GERD (gastroesophageal reflux disease)     Gout     Grade II diastolic dysfunction 06/26/2019    Graves disease     Healthcare associated bacterial pneumonia 02/02/2022    Healthcare-associated pneumonia 04/27/2021    Heart murmur     Hepatitis C     History of transfusion     Hyperlipidemia     Hypertension     Hypothyroidism     Insomnia     Kidney carcinoma     Kidney failure     MR (mitral regurgitation)     moderate, with cunningham dysfctn, severe LAE, mod OLENA, aortic thickening without insuff- Dr Horn    Neuropathy     Osteomyelitis of toe     Osteoporosis     Peripheral edema     Pes planus     Postoperative wound infection     Subconjunctival hemorrhage     Transplant recipient 1995, 2011    BILATERAL KIDNEYS    Type 2 diabetes mellitus with diabetic mononeuropathy        Past Surgical History:   Procedure Laterality Date    AMPUTATION DIGIT Left 6/9/2016    Procedure: LT 4TH TOE AMPUTATION;   Surgeon: Navjot Gandara MD;  Location:  [facility];  Service:     AMPUTATION DIGIT Left 12/15/2016    Procedure: LT THIRD TOE AMPUTATION;  Surgeon: Navjot Gandara MD;  [facility];  Service:     ARTERIOVENOUS FISTULA Bilateral -LEFT ARM, -RIGHT ARM    CHOLECYSTECTOMY      COLONOSCOPY N/A 2019    Procedure: COLONOSCOPY WITHOUT PREP WITH BIOPSIES;  Surgeon: Cam Shin MD;  [facility];  Service: Gastroenterology    COLONOSCOPY W/ POLYPECTOMY N/A 2025    Procedure: COLONOSCOPY WITH POLYPECTOMY;  Surgeon: Houston Mata MD;  Location:  [facility];  Service: Gastroenterology;  Laterality: N/A;  descending polyp, diverticulosis    EYE SURGERY Bilateral ,     CATARACTS    JOINT REPLACEMENT Left     SHOULDER    NEPHRECTOMY Right 2008    CANCER    SKIN GRAFT Right     LEG    TOE AMPUTATION Left     FIFTH TOE    TONSILLECTOMY  1964    TRANSPLANTATION RENAL Left 1995-RIGHT KIDNEY TRANSPLANT    TUBAL ABDOMINAL LIGATION         Social History     Socioeconomic History    Marital status:      Spouse name: Calvin   Tobacco Use    Smoking status: Former     Current packs/day: 0.00     Average packs/day: 0.5 packs/day for 4.0 years (2.0 ttl pk-yrs)     Types: Cigarettes     Start date: 1989     Quit date: 1993     Years since quittin.5    Smokeless tobacco: Never   Vaping Use    Vaping status: Never Used   Substance and Sexual Activity    Alcohol use: No     Comment: caffeine use: 1 cup 3 times wkly    Drug use: No    Sexual activity: Not Currently       Family History   Problem Relation Age of Onset    Hypothyroidism Mother     COPD Mother     COPD Father        The following portion of the patient's history were reviewed and updated as appropriate: past medical history, past surgical history, past social history, past family history, allergies, current medications, and problem list.    Review of Systems    Constitutional: Negative.   Cardiovascular: Negative.    Respiratory: Negative.     Hematologic/Lymphatic: Negative.    Neurological: Negative.        Allergies   Allergen Reactions    Heparin Hives and Myalgia     Tremors,fever    Levofloxacin Hives and Anaphylaxis     Throat swelling no treatment given       Current Outpatient Medications:     Accu-Chek FastClix Lancets misc, , Disp: , Rfl:     Accu-Chek SmartView test strip, , Disp: , Rfl:     aspirin 81 MG EC tablet, Take 1 tablet by mouth Daily., Disp: , Rfl:     azaTHIOprine (IMURAN) 50 MG tablet, Take 1 tablet by mouth Daily. 3 tablets once daily, Disp: , Rfl:     BD Pen Needle Paula 2nd Gen 32G X 4 MM misc, , Disp: , Rfl:     carvedilol (COREG) 12.5 MG tablet, Take 1 tablet by mouth., Disp: , Rfl:     Cholecalciferol (VITAMIN D3) 5000 units tablet tablet, Take 1 tablet by mouth Daily., Disp: , Rfl:     escitalopram (LEXAPRO) 20 MG tablet, TAKE ONE TABLET BY MOUTH EVERY DAY, Disp: 90 tablet, Rfl: 1    esomeprazole (nexIUM) 20 MG capsule, Take 1 capsule by mouth Every Morning Before Breakfast., Disp: , Rfl:     furosemide (LASIX) 40 MG tablet, Take 1 tablet by mouth Daily., Disp: 90 tablet, Rfl: 0    glucose blood (Accu-Chek SmartView) test strip, 1 each by Other route., Disp: , Rfl:     glucose blood test strip, 1 each by Other route 2 (Two) Times a Day., Disp: , Rfl:     insulin lispro (humaLOG) 100 UNIT/ML injection, Inject 4 Units under the skin into the appropriate area as directed Every Morning., Disp: , Rfl:     levothyroxine (SYNTHROID, LEVOTHROID) 125 MCG tablet, Take 1 tablet by mouth Daily. (Patient taking differently: Take 1 tablet by mouth Daily. Mon-sat hold Sunday), Disp: , Rfl:     magnesium oxide (MAG-OX) 400 MG tablet, Take 2 tablets by mouth 2 (Two) Times a Day., Disp: , Rfl:     Multiple Vitamins-Minerals (MULTIVITAMIN WITH MINERALS) tablet tablet, Take 1 tablet by mouth Daily., Disp: , Rfl:     olmesartan (BENICAR) 5 MG tablet, Take 1  "tablet by mouth Daily., Disp: , Rfl:     predniSONE (DELTASONE) 2.5 MG tablet, Take 1 tablet by mouth Daily., Disp: , Rfl:     rosuvastatin (CRESTOR) 40 MG tablet, Take 1 tablet by mouth Daily., Disp: , Rfl:     tacrolimus (PROGRAF) 1 MG capsule, Take 1 capsule by mouth 2 (Two) Times a Day., Disp: , Rfl:     Tresiba FlexTouch 100 UNIT/ML solution pen-injector injection, INJECT 6 UNITS UNDER THE SKIN NIGHTLY. PEN EXPIRES 56 DAYS AFTER OPENING, Disp: , Rfl:              Objective:     Vitals:    07/18/25 1455   BP: 136/70   BP Location: Left arm   Patient Position: Sitting   Cuff Size: Adult   Pulse: 63   SpO2: 96%   Weight: 101 kg (222 lb 12.8 oz)   Height: 162.6 cm (64\")       Body mass index is 38.24 kg/m².    PHYSICAL EXAM:    Vitals Reviewed.   General Appearance: No acute distress, well developed and well nourished. Obese.   HENT: No hearing loss noted.    Respiratory: No signs of respiratory distress. Respiration rhythm and depth normal.  Clear to auscultation.   Cardiovascular:  Jugular Venous Pressure: Normal  Heart Rate and Rhythm: Normal, Heart Sounds: Normal S1 and S2. No S3 or S4 noted.  Murmurs: RUSB grade 1/6 systolic murmur present.  Lower Extremities: No edema noted.  Musculoskeletal: Normal movement of extremities.  Skin: General appearance normal.    Psychiatric: Patient alert and oriented to person, place, and time. Speech and behavior appropriate. Normal mood and affect.       ECG 12 Lead    Date/Time: 7/18/2025 2:56 PM  Performed by: Tawanna Schwarz APRN    Authorized by: Tawanna Schwarz APRN  Comparison: compared with previous ECG from 5/28/2025  Similar to previous ECG  Rhythm: sinus rhythm  Ectopy: atrial premature contractions  Rate: normal  BPM: 63  Conduction: conduction normal  ST Segments: ST segments normal  T Waves: T waves normal  QRS axis: normal    Clinical impression: non-specific ECG            Assessment:       Diagnosis Plan   1. Grade II diastolic dysfunction  Basic " Metabolic Panel    Adult Transthoracic Echo Complete w/ Color, Spectral and Contrast if Necessary Per Protocol      2. Nonrheumatic aortic valve insufficiency  Basic Metabolic Panel    Adult Transthoracic Echo Complete w/ Color, Spectral and Contrast if Necessary Per Protocol      3. Nonrheumatic mitral valve regurgitation  Basic Metabolic Panel    Adult Transthoracic Echo Complete w/ Color, Spectral and Contrast if Necessary Per Protocol      4. Nonrheumatic tricuspid valve regurgitation  Basic Metabolic Panel    Adult Transthoracic Echo Complete w/ Color, Spectral and Contrast if Necessary Per Protocol      5. Pulmonary hypertension  Basic Metabolic Panel    Adult Transthoracic Echo Complete w/ Color, Spectral and Contrast if Necessary Per Protocol      6. Suspected sleep apnea  Basic Metabolic Panel    Adult Transthoracic Echo Complete w/ Color, Spectral and Contrast if Necessary Per Protocol                 Plan:     1-5.  Her recent echocardiogram showed the following: LVEF normal, grade 2 diastolic dysfunction, left atrium cavity severely dilated, left atrial volume severely increased, trace aortic regurgitation, mild to moderate mitral regurgitation, trace to mild tricuspid regurgitation, and moderate-severe pulmonary hypertension. She denied shortness of breath.  Dr. Capellan recommended furosemide 40 mg daily this was okay with her kidney transplant doctor.      She is tolerating the furosemide.  She has not had the repeat BMP and I recommended that the BMP be completed today.  She has declined a sleep apnea evaluation.  Repeat echocardiogram in 3 months to see the response to furosemide.    6.  Nonobstructive coronary artery disease noted per catheterization in May 2011.  Normal stress test in 2020.  Denies angina.  Continue aspirin and statin.    7.  Single premature atrial contraction noted on today's EKG.  Asymptomatic.    8.  Hypertension: Blood pressure stable.    9.  Hyperlipidemia: Remains on  rosuvastatin    10.  Type 2 diabetes mellitus.    11.  Renal transplant recipient on 2 different occasions.    12.  She has declined sleep apnea evaluation.    13.  She will call with any cardiac concerns.  Follow-up with Dr. Capellan and same-day echocardiogram to be completed in 3 months.    As always, it has been a pleasure to participate in your patient's care. Thank you.         Sincerely,         ROMARIO Maldonado  [facility] Cardiology      Dictated utilizing Dragon Dictation

## 2025-07-18 NOTE — TELEPHONE ENCOUNTER
Colorectal & General Surgery  Endoscopy Follow-Up Note    Patient: Diana Gee  YOB: 1950  MRN: [id number]      Indication  Positive colorectal cancer screening test    Pathology  Descending colon polyp: Hyperplastic polyp    Recommendation  Recommendation would be for repeat colonoscopy in 10 years but that will put her at 85 years old.  Decision to be based on clinical status at that time.      Alfa Mata MD  Colorectal & General Surgery  [facility] Surgical [facility]    [address], [address]  [place], [place], [facility]  P: [phone]  F: [phone]

## 2025-08-04 ENCOUNTER — OFFICE VISIT (OUTPATIENT)
Dept: FAMILY MEDICINE CLINIC | Facility: CLINIC | Age: 75
End: 2025-08-04
Payer: MEDICARE

## 2025-08-04 VITALS
HEIGHT: 64 IN | SYSTOLIC BLOOD PRESSURE: 128 MMHG | WEIGHT: 191.1 LBS | BODY MASS INDEX: 32.62 KG/M2 | OXYGEN SATURATION: 96 % | DIASTOLIC BLOOD PRESSURE: 84 MMHG | HEART RATE: 66 BPM

## 2025-08-04 DIAGNOSIS — Z94.0 RENAL TRANSPLANT RECIPIENT: ICD-10-CM

## 2025-08-04 DIAGNOSIS — F32.0 MILD MAJOR DEPRESSION: ICD-10-CM

## 2025-08-04 DIAGNOSIS — E78.2 MIXED HYPERLIPIDEMIA: ICD-10-CM

## 2025-08-04 DIAGNOSIS — F41.9 ANXIETY: Primary | ICD-10-CM

## 2025-08-04 DIAGNOSIS — E11.59 TYPE 2 DIABETES MELLITUS WITH OTHER CIRCULATORY COMPLICATION, WITH LONG-TERM CURRENT USE OF INSULIN: ICD-10-CM

## 2025-08-04 DIAGNOSIS — Z79.4 TYPE 2 DIABETES MELLITUS WITH OTHER CIRCULATORY COMPLICATION, WITH LONG-TERM CURRENT USE OF INSULIN: ICD-10-CM

## 2025-08-04 DIAGNOSIS — Z00.00 MEDICARE ANNUAL WELLNESS VISIT, SUBSEQUENT: ICD-10-CM

## 2025-08-04 PROCEDURE — 1126F AMNT PAIN NOTED NONE PRSNT: CPT | Performed by: NURSE PRACTITIONER

## 2025-08-04 PROCEDURE — 1170F FXNL STATUS ASSESSED: CPT | Performed by: NURSE PRACTITIONER

## 2025-08-04 PROCEDURE — G2211 COMPLEX E/M VISIT ADD ON: HCPCS | Performed by: NURSE PRACTITIONER

## 2025-08-04 PROCEDURE — 99213 OFFICE O/P EST LOW 20 MIN: CPT | Performed by: NURSE PRACTITIONER

## 2025-08-04 PROCEDURE — G0439 PPPS, SUBSEQ VISIT: HCPCS | Performed by: NURSE PRACTITIONER

## 2025-08-04 RX ORDER — ESCITALOPRAM OXALATE 20 MG/1
20 TABLET ORAL DAILY
Qty: 90 TABLET | Refills: 3 | Status: SHIPPED | OUTPATIENT
Start: 2025-08-04

## (undated) DEVICE — SOL IRR H2O BO 1000ML STRL

## (undated) DEVICE — SINGLE-USE BIOPSY FORCEPS: Brand: RADIAL JAW 4

## (undated) DEVICE — BW-412T DISP COMBO CLEANING BRUSH: Brand: SINGLE USE COMBINATION CLEANING BRUSH

## (undated) DEVICE — Device: Brand: DEFENDO AIR/WATER/SUCTION AND BIOPSY VALVE

## (undated) DEVICE — THE TORRENT IRRIGATION SCOPE CONNECTOR IS USED WITH THE TORRENT IRRIGATION TUBING TO PROVIDE IRRIGATION FLUIDS SUCH AS STERILE WATER DURING GASTROINTESTINAL ENDOSCOPIC PROCEDURES WHEN USED IN CONJUNCTION WITH AN IRRIGATION PUMP (OR ELECTROSURGICAL UNIT).: Brand: TORRENT

## (undated) DEVICE — CANNULA,ADULT,SOFT-TOUCH,7'TUBE,UC: Brand: PENDING

## (undated) DEVICE — KT ORCA ORCAPOD DISP STRL

## (undated) DEVICE — VIAL FORMALIN CAP 10P 40ML

## (undated) DEVICE — FRCP BX RADJAW4 NDL 2.8 240CM LG OG BX40

## (undated) DEVICE — Device

## (undated) DEVICE — LINER SURG CANSTR SXN S/RIGD 1500CC

## (undated) DEVICE — ADAPT CLN BIOGUARD AIR/H2O DISP

## (undated) DEVICE — TUBING, SUCTION, 1/4" X 10', STRAIGHT: Brand: MEDLINE